# Patient Record
Sex: FEMALE | Race: WHITE | Employment: FULL TIME | URBAN - METROPOLITAN AREA
[De-identification: names, ages, dates, MRNs, and addresses within clinical notes are randomized per-mention and may not be internally consistent; named-entity substitution may affect disease eponyms.]

---

## 2018-04-23 ENCOUNTER — TRANSFERRED RECORDS (OUTPATIENT)
Dept: HEALTH INFORMATION MANAGEMENT | Facility: CLINIC | Age: 28
End: 2018-04-23

## 2018-05-08 ENCOUNTER — TRANSFERRED RECORDS (OUTPATIENT)
Dept: HEALTH INFORMATION MANAGEMENT | Facility: CLINIC | Age: 28
End: 2018-05-08

## 2018-06-01 ENCOUNTER — TRANSFERRED RECORDS (OUTPATIENT)
Dept: HEALTH INFORMATION MANAGEMENT | Facility: CLINIC | Age: 28
End: 2018-06-01

## 2018-06-22 ENCOUNTER — OFFICE VISIT (OUTPATIENT)
Dept: PULMONOLOGY | Facility: CLINIC | Age: 28
End: 2018-06-22
Attending: INTERNAL MEDICINE
Payer: COMMERCIAL

## 2018-06-22 VITALS
HEIGHT: 66 IN | SYSTOLIC BLOOD PRESSURE: 114 MMHG | DIASTOLIC BLOOD PRESSURE: 76 MMHG | OXYGEN SATURATION: 95 % | BODY MASS INDEX: 27.13 KG/M2 | WEIGHT: 168.8 LBS | RESPIRATION RATE: 16 BRPM | HEART RATE: 94 BPM

## 2018-06-22 DIAGNOSIS — K50.10 CROHN'S DISEASE OF LARGE INTESTINE WITHOUT COMPLICATION (H): Primary | ICD-10-CM

## 2018-06-22 DIAGNOSIS — B02.9 HERPES ZOSTER WITHOUT COMPLICATION: ICD-10-CM

## 2018-06-22 PROCEDURE — G0463 HOSPITAL OUTPT CLINIC VISIT: HCPCS | Mod: ZF

## 2018-06-22 ASSESSMENT — PAIN SCALES - GENERAL: PAINLEVEL: NO PAIN (0)

## 2018-06-22 NOTE — LETTER
6/22/2018       RE: Natalie Villa  [address] [place] [id number]     Dear Colleague,    Thank you for referring your patient, Natalie Villa, to the [facility] at [facility]. Please see a copy of my visit note below.    GI CLINIC VISIT - NEW PATIENT    CC/REFERRING PROVIDER: Referred Self  REASON FOR CONSULTATION: Establish IBD care    HPI: 27 year old female with history of Crohn's vs US on her third biologic + AZA who presents to clinic to establish care after moving from [place].      She was initially diagnosed with Crohn's in 2013. Her symptoms at the time were bloody diarrhea, left sided abdominal pain and general fatigue. She was treated with Humira for 1 year but stopped after colonoscopy showed ongoing disease. Then was treated with Remicade for about 18 months but per patient report, thinks she developed antibodies to it. She was started on Vedolizumab in June of 2015 initially at 300 mg Q8 weeks which was increased to Q6 weeks and mostly recently to Q4 weeks in May of this year after colonoscopy in April showed ongoing colitis. She has been on AZA since diagnosis, initially at 200 mg Qday which has been titrated down to 100 mg Qday.     She currently has very few symptoms. She has 3 BM/day that are well formed, without mucus or blood. She has no abdominal pain. She has no recent hospitalizations, ED visits or steroid requirements.     Last week noticed a painful vesicular rash on the left inner thigh that wraps around the back of her leg and was diagnosed with shingles. Started on a 7 day course of valcyclovir.     Denies any tenesmus or insecurity passing flatus, no fecal incontinence or new perianal/nocturnal sxs noted. Denies any N/V/F/C/HA/NS or other const/syst/cardiopulmonary sxs, no BRBPR/melena/, no unintentional wt loss or appetite/satiety changes. No other bowel/bladder habit changes, no dysphagia/odynophagia. No acholic  stools/steatorrhea, no jt pain/oral ulcer/rash/eye sxs noted.    ROS: 10pt ROS performed and otherwise negative.    PERTINENT PAST MEDICAL HISTORY:  As noted above.    PREVIOUS ABDOMINAL/GYNECOLOGIC SURGERIES:  None     PREVIOUS ENDOSCOPY:  Colonoscopy 4/23/2018: Mild colitis starting at the transverse colon (Borrego 1), progressing to more moderate colitis in the rectosigmoid (Borrego 2). Bx showed chronic colitis. TI and right colon were unremarkable endoscopically and bx showed mild chronic activity.     PERTINENT MEDICATIONS:  - Azathioprine 100 mg Qday   - Vedolizumab 300 mg Q4W (last dose 6/13/2018)  - Maria Victoria OCP   - Valacyclovir 7 day course     Medications reviewed with patient today, see Medication List/Assessment for details.  No other NSAID/anticoagulation reported by patient.  No other OTC/herbal/supplements reported by patient.    SOCIAL HISTORY:  - Works as   - Uses alcohol 1-2 times/month   - Smokes marijuana 2-3 times/week  - No tobacco   - Sexually active, uses condoms and OCPs as birth control     FAMILY HISTORY:  No colon/panc/esophageal/other GI CA, no other Valverde or other HPS-related Keo. No IBD/celiac, no other AI/liver/thyroid disease. + for asthma and acne.     IBD HEALTH MAINTENANCE:  Vaccinations:  -- Influenza (every year): Last given 2017  -- TdaP (every 10 years): Last given 2014  -- Pneumococcal Pneumonia (once then every 5 years): Last given 2015  -- Yearly assessment for latent Tb (verbal screening and exam, PPD or QuantiFERON-Tb testing): Indeterminate due to anergy 2017     One time confirmation of immunity or serologies:  -- Hepatitis A (serologies or immunizations): Unknown  -- Hepatitis B (serologies or immunizations): Vaccinated  -- Varicella: Unknown  -- MMR: Unknwon   -- HPV (all aged 18-26): Up to date  -- Meningococcal meningitis (all patients at risk for meningitis): Unknown  -- Due to the immunosuppression in this patient, I would not advise administration of live  vaccines such as varicella/VZV, intranasal influenza, MMR, or yellow fever vaccine (if travelling).      Bone mineral density screening   -- DEXA WNL 2015    Cancer Screening:  Colon cancer screening: due 2020  Cervical cancer screening: Annual due to immunosupression    PHYSICAL EXAMINATION:  Vitals reviewed, AFVSS  Wt 168# today   Gen: aaox3, cooperative, pleasant, not diaphoretic, nad  HEENT: ncat, neck supple, normal op w/o ulcer/exudate, anicteric, mmm  Resp/CV without acute findings, not dyspneic/tachycardic  Abd: Nondistended  Ext: no c/c/e  Skin: warm, perfused, no jaundice  Neuro: grossly intact    PERTINENT STUDIES:  Outside labs from 4/16/2018:  Lytes/LFT WNL, cr 0.65, alb 4.2  wbc 3.96, hgb 11.8, plt 246, mcv 95  ESR/CRP 6/2.6  Iron studies, B12, vit D WNL in 2016    ASSESSMENT/PLAN:    1. Colonic Crohn's Disease with concern for indeterminate IBD/phenotypic UC. She has had good clinical response to Entyvio and AZA however need to confirm mucosal response in addition to clinical response. Discussed that it is concerning that she is young and currently on her third biologic therapy (hx of Humira, Remicade) with a relative rapid dose escalation.   - Continue Entyvio 300mg IV Q4 weeks (dose adjusted to Q4W in May 2018)  - Continue AZA 100mg Qday. Will check TPMT and metabolite levels to determine is dose adjustment needs to be made given recent change in Entyvio timing and recent ongoing colitis on colonoscopy  - Likely will need repeat colonoscopy in 3-4 months to ensure mucosal response to change  In Entyvio dosing. Will confirm timing of the exam at return visit.  - Will check nutritional studies and medication safety monitoring today (BMP, LFT, CBC w/diff, lipase, ESR/CRP)  - Briefly discussed family planning with use of dual contraception (condoms + OCP)    2. IBD health maintenance   - Establish care with PCP to ensure all vaccinations have been updated   - Check VZV and MMR titers to confirm immunity    - Avoid liver attenuated vaccines     3. Shingles   - Complete course of Valacyclovir as prescribed     RTC in 3 months, sooner if symptomatic.      Patient was seen and discussed with attending physician Dr. Niraj Toth, who agrees with above assessment and plan.     Nikki Sanford, MS4     ATTENDING ATTESTATION:    REFERRING PROVIDER: Self-referred  DATE SEEN: 6/22/18    1. It was wonderful getting a chance to meet with you to discuss your colonic Crohn's Disease (discussed concerns for indeterminate IBD/phenotypic UC today), particularly given your good overall clinical response to Entyvio + Azathioprine (AZA) - discussed next steps in management and evaluation at length today, particularly given your prior exposures to biologic therapies (Humira and Remicade).  - Continue Entyvio 300mg IV every 4 weeks as ordered for now, recent transition to this schedule in the last 1-2 months. Will work on transitioning this treatment to our care, discussed potential insurance/logistical delays accordingly.  - Continue your AZA 100mg daily as ordered for now. Will check TPMT level to confirm metabolism and metabolite levels to help determine if further adjustment is necessary.  - Recommend moving forward with a repeat diagnostic colonoscopy to confirm mucosal response, likely in the next 3-4 months. Can readdress timing at next visit.  - Briefly discussed Preventive Care and Fertility/Pregnancy in IBD today. Continue your dual-contraceptive measures (condoms + OCP) for now.  - Complete your 10-day course of valacyclovir for treatment of shingles as ordered.    2. Recommend routine studies including nutritional markers as we discussed today.   - Recommend having the following studies checked at least 2-3 times/year for disease and medication safety monitoring: BMP, LFT, CBC with differential, ESR/CRP.    3. Continue to monitor for the danger signs/symptoms we reviewed together today:  worsening abdominal pain, worsening  diarrhea, obstructive symptoms (nausea/vomiting, abdominal distention, inability to pass stool/flatus), blood mixed into stools, persistent fevers/chills, progressive anemia (particulary with iron deficiency), difficulty with swallowing, perianal/rectal discomfort (particularly with defecation), unexpected weight loss, etc.  - Contact us via MyChart or phone should these symptoms occur, particularly as we may advise further evaluation accordingly.    4. Return to GI Clinic with my GI Physician Assistant (Guero Arias) or me in 3 months to review your progress, sooner if symptomatic.     Thank you for this consultation. It was a pleasure to participate in the care of this patient; please contact us with any further questions.    Patient was discussed, seen, and examined by me, Niraj Toth. The plan of care and pertinent data/imaging were also reviewed with the GI Medical Student in clinic today. Agree with the joint assessment and plan as delineated above.    Please contact me with any further questions.    Niraj Toth MD    [facility] - Department of Medicine  Division of Gastroenterology                Again, thank you for allowing me to participate in the care of your patient.      Sincerely,    Niraj Toth MD

## 2018-06-22 NOTE — PATIENT INSTRUCTIONS
I've included a brief summary of our discussion and care plan from today's visit below.  Please review this information with your primary care provider.  _______________________________________________________________________      1. It was wonderful getting a chance to meet with you to discuss your colonic Crohn's Disease (discussed concerns for indeterminate IBD/phenotypic UC today), particularly given your good overall clinical response to Entyvio + Azathioprine (AZA) - discussed next steps in management and evaluation at length today, particularly given your prior exposures to biologic therapies (Humira and Remicade).  - Continue Entyvio 300mg IV every 4 weeks as ordered for now, recent transition to this schedule in the last 1-2 months. Will work on transitioning this treatment to our care, discussed potential insurance/logistical delays accordingly.  - Continue your AZA 100mg daily as ordered for now. Will check TPMT level to confirm metabolism and metabolite levels to help determine if further adjustment is necessary.  - Recommend moving forward with a repeat diagnostic colonoscopy to confirm mucosal response, likely in the next 3-4 months. Can readdress timing at next visit.  - Briefly discussed Preventive Care and Fertility/Pregnancy in IBD today. Continue your dual-contraceptive measures (condoms + OCP) for now.  - Complete your 10-day course of valacyclovir for treatment of shingles as ordered.    2. Recommend routine studies including nutritional markers as we discussed today.   - Recommend having the following studies checked at least 2-3 times/year for disease and medication safety monitoring: BMP, LFT, CBC with differential, ESR/CRP.    3. Continue to monitor for the danger signs/symptoms we reviewed together today:  worsening abdominal pain, worsening diarrhea, obstructive symptoms (nausea/vomiting, abdominal distention, inability to pass stool/flatus), blood mixed into stools, persistent  fevers/chills, progressive anemia (particulary with iron deficiency), difficulty with swallowing, perianal/rectal discomfort (particularly with defecation), unexpected weight loss, etc.  - Contact us via MyChart or phone should these symptoms occur, particularly as we may advise further evaluation accordingly.    4. Return to GI Clinic with my GI Physician Assistant (Guero Arias) or me in 3 months to review your progress, sooner if symptomatic.   - If you are unable to schedule this follow-up appointment today, please contact our  at [phone] within the next week to help set up this necessary appointment.    PREVENTIVE CARE IN INFLAMMATORY BOWEL DISEASE    - Strongly recommend tobacco abstinence.    - Recommend considering daily calcium + Vitamin D supplementation.    - Recommend age-appropriate cancer surveillance:  - Yearly Dermatology visit for visual skin inspection if immunosuppressed, monthly skin self-check after bathing.  - Dysplasia surveillance colonoscopy every 1-2 years in patients with Crohn's colitis or ulcerative colitis >8-10 years since diagnosis.  - (For men and women ages 9-26) Consideration for HPV vaccination, should be discussed with your PCP further if you feel you'd like to pursue this.  - (For women) Annual Pap smears if immunosuppressed, mammogram when deemed appropriate by your PCP.    - Recommend follow-up with your PCP to ensure the following vaccinations have been updated: Hepatitis A/B, Tdap, Pneumococcal pneumonia, yearly flu SHOT, Meningococcal meningitis (if college-age), HPV (all aged 18-26), etc.  - Would also recommend VZV and MMR titers be checked to confirm immunity.  - Live/attenuated vaccines (such as the INTRANASAL flu vaccine, MMR, Yellow Fever, or Zostavax vaccine) should not be administered to immunosuppressed patients, except in very specific instances which you should discuss with a GI and Infectious Disease provider first.    - Family planning:  If you  or your partner are planning to become pregnant, please schedule time with us to discuss issues surrounding IBD and Fertility/Pregnancy.    _______________________________________________________________________    It was a pleasure seeing you in clinic today - please be in touch if there are any further questions that arise following today's visit.  During business hours, you may reach Clinic Nurse Triage Line at [phone].  For urgent/emergent questions after business hours, you may reach the on-call GI Fellow by contacting the [facility]  at [phone].    Any benign/non-urgent test results are usually communicated via letter or [employer] message within 1-2 weeks after completion.  Urgent results (those that require a change in the previously-discussed care plan) are usually communicated via a phone call once available from our clinic staff to discuss the results and the next steps in your evaluation.    I recommend signing up for [employer] access if you have not already done so and are comfortable with using a computer.  This allows for online access to your lab results and also helps you communicate efficiently with my clinic should any questions arise in your care.    We have Financial Counseling services available through our clinic.  If you have questions about your insurance coverage or payment responsibilities, particularly before you undergo any tests or procedures, please let us know and we can arrange a consultation accordingly to help you make informed decisions about your healthcare.    Sincerely,    Niraj Toth MD    [facility] - Department of Medicine  Division of Gastroenterology

## 2018-06-22 NOTE — LETTER
Date:June 26, 2018      Patient was self referred, no letter generated. Do not send.        [facility] Health Information

## 2018-06-22 NOTE — NURSING NOTE
Chief Complaint   Patient presents with     Consult     Patient is here for IBD consult     Cely Thomas, SMA

## 2018-06-22 NOTE — PROGRESS NOTES
GI CLINIC VISIT - NEW PATIENT    CC/REFERRING PROVIDER: Referred Self  REASON FOR CONSULTATION: Establish IBD care    HPI: 27 year old female with history of Crohn's vs US on her third biologic + AZA who presents to clinic to establish care after moving from [place].      She was initially diagnosed with Crohn's in 2013. Her symptoms at the time were bloody diarrhea, left sided abdominal pain and general fatigue. She was treated with Humira for 1 year but stopped after colonoscopy showed ongoing disease. Then was treated with Remicade for about 18 months but per patient report, thinks she developed antibodies to it. She was started on Vedolizumab in June of 2015 initially at 300 mg Q8 weeks which was increased to Q6 weeks and mostly recently to Q4 weeks in May of this year after colonoscopy in April showed ongoing colitis. She has been on AZA since diagnosis, initially at 200 mg Qday which has been titrated down to 100 mg Qday.     She currently has very few symptoms. She has 3 BM/day that are well formed, without mucus or blood. She has no abdominal pain. She has no recent hospitalizations, ED visits or steroid requirements.     Last week noticed a painful vesicular rash on the left inner thigh that wraps around the back of her leg and was diagnosed with shingles. Started on a 7 day course of valcyclovir.     Denies any tenesmus or insecurity passing flatus, no fecal incontinence or new perianal/nocturnal sxs noted. Denies any N/V/F/C/HA/NS or other const/syst/cardiopulmonary sxs, no BRBPR/melena/, no unintentional wt loss or appetite/satiety changes. No other bowel/bladder habit changes, no dysphagia/odynophagia. No acholic stools/steatorrhea, no jt pain/oral ulcer/rash/eye sxs noted.    ROS: 10pt ROS performed and otherwise negative.    PERTINENT PAST MEDICAL HISTORY:  As noted above.    PREVIOUS ABDOMINAL/GYNECOLOGIC SURGERIES:  None     PREVIOUS ENDOSCOPY:  Colonoscopy 4/23/2018: Mild colitis starting at the  transverse colon (Borrego 1), progressing to more moderate colitis in the rectosigmoid (Borrego 2). Bx showed chronic colitis. TI and right colon were unremarkable endoscopically and bx showed mild chronic activity.     PERTINENT MEDICATIONS:  - Azathioprine 100 mg Qday   - Vedolizumab 300 mg Q4W (last dose 6/13/2018)  - Maria Victoria OCP   - Valacyclovir 7 day course     Medications reviewed with patient today, see Medication List/Assessment for details.  No other NSAID/anticoagulation reported by patient.  No other OTC/herbal/supplements reported by patient.    SOCIAL HISTORY:  - Works as   - Uses alcohol 1-2 times/month   - Smokes marijuana 2-3 times/week  - No tobacco   - Sexually active, uses condoms and OCPs as birth control     FAMILY HISTORY:  No colon/panc/esophageal/other GI CA, no other Valverde or other HPS-related Keo. No IBD/celiac, no other AI/liver/thyroid disease. + for asthma and acne.     IBD HEALTH MAINTENANCE:  Vaccinations:  -- Influenza (every year): Last given 2017  -- TdaP (every 10 years): Last given 2014  -- Pneumococcal Pneumonia (once then every 5 years): Last given 2015  -- Yearly assessment for latent Tb (verbal screening and exam, PPD or QuantiFERON-Tb testing): Indeterminate due to anergy 2017     One time confirmation of immunity or serologies:  -- Hepatitis A (serologies or immunizations): Unknown  -- Hepatitis B (serologies or immunizations): Vaccinated  -- Varicella: Unknown  -- MMR: Unknwon   -- HPV (all aged 18-26): Up to date  -- Meningococcal meningitis (all patients at risk for meningitis): Unknown  -- Due to the immunosuppression in this patient, I would not advise administration of live vaccines such as varicella/VZV, intranasal influenza, MMR, or yellow fever vaccine (if travelling).      Bone mineral density screening   -- DEXA WNL 2015    Cancer Screening:  Colon cancer screening: due 2020  Cervical cancer screening: Annual due to immunosupression    PHYSICAL  EXAMINATION:  Vitals reviewed, AFVSS  Wt 168# today   Gen: aaox3, cooperative, pleasant, not diaphoretic, nad  HEENT: ncat, neck supple, normal op w/o ulcer/exudate, anicteric, mmm  Resp/CV without acute findings, not dyspneic/tachycardic  Abd: Nondistended  Ext: no c/c/e  Skin: warm, perfused, no jaundice  Neuro: grossly intact    PERTINENT STUDIES:  Outside labs from 4/16/2018:  Lytes/LFT WNL, cr 0.65, alb 4.2  wbc 3.96, hgb 11.8, plt 246, mcv 95  ESR/CRP 6/2.6  Iron studies, B12, vit D WNL in 2016    ASSESSMENT/PLAN:    1. Colonic Crohn's Disease with concern for indeterminate IBD/phenotypic UC. She has had good clinical response to Entyvio and AZA however need to confirm mucosal response in addition to clinical response. Discussed that it is concerning that she is young and currently on her third biologic therapy (hx of Humira, Remicade) with a relative rapid dose escalation.   - Continue Entyvio 300mg IV Q4 weeks (dose adjusted to Q4W in May 2018)  - Continue AZA 100mg Qday. Will check TPMT and metabolite levels to determine is dose adjustment needs to be made given recent change in Entyvio timing and recent ongoing colitis on colonoscopy  - Likely will need repeat colonoscopy in 3-4 months to ensure mucosal response to change  In Entyvio dosing. Will confirm timing of the exam at return visit.  - Will check nutritional studies and medication safety monitoring today (BMP, LFT, CBC w/diff, lipase, ESR/CRP)  - Briefly discussed family planning with use of dual contraception (condoms + OCP)    2. IBD health maintenance   - Establish care with PCP to ensure all vaccinations have been updated   - Check VZV and MMR titers to confirm immunity   - Avoid liver attenuated vaccines     3. Shingles   - Complete course of Valacyclovir as prescribed     RTC in 3 months, sooner if symptomatic.      Patient was seen and discussed with attending physician Dr. Niraj Toth, who agrees with above assessment and plan.     Nikki  MS Juvenal4     ATTENDING ATTESTATION:    REFERRING PROVIDER: Self-referred  DATE SEEN: 6/22/18    1. It was wonderful getting a chance to meet with you to discuss your colonic Crohn's Disease (discussed concerns for indeterminate IBD/phenotypic UC today), particularly given your good overall clinical response to Entyvio + Azathioprine (AZA) - discussed next steps in management and evaluation at length today, particularly given your prior exposures to biologic therapies (Humira and Remicade).  - Continue Entyvio 300mg IV every 4 weeks as ordered for now, recent transition to this schedule in the last 1-2 months. Will work on transitioning this treatment to our care, discussed potential insurance/logistical delays accordingly.  - Continue your AZA 100mg daily as ordered for now. Will check TPMT level to confirm metabolism and metabolite levels to help determine if further adjustment is necessary.  - Recommend moving forward with a repeat diagnostic colonoscopy to confirm mucosal response, likely in the next 3-4 months. Can readdress timing at next visit.  - Briefly discussed Preventive Care and Fertility/Pregnancy in IBD today. Continue your dual-contraceptive measures (condoms + OCP) for now.  - Complete your 10-day course of valacyclovir for treatment of shingles as ordered.    2. Recommend routine studies including nutritional markers as we discussed today.   - Recommend having the following studies checked at least 2-3 times/year for disease and medication safety monitoring: BMP, LFT, CBC with differential, ESR/CRP.    3. Continue to monitor for the danger signs/symptoms we reviewed together today:  worsening abdominal pain, worsening diarrhea, obstructive symptoms (nausea/vomiting, abdominal distention, inability to pass stool/flatus), blood mixed into stools, persistent fevers/chills, progressive anemia (particulary with iron deficiency), difficulty with swallowing, perianal/rectal discomfort (particularly  with defecation), unexpected weight loss, etc.  - Contact us via MyChart or phone should these symptoms occur, particularly as we may advise further evaluation accordingly.    4. Return to GI Clinic with my GI Physician Assistant (Guero Arias) or me in 3 months to review your progress, sooner if symptomatic.     Thank you for this consultation. It was a pleasure to participate in the care of this patient; please contact us with any further questions.    Patient was discussed, seen, and examined by me, Niraj Toth. The plan of care and pertinent data/imaging were also reviewed with the GI Medical Student in clinic today. Agree with the joint assessment and plan as delineated above.    Please contact me with any further questions.    Niraj Toth MD    [facility] - Department of Medicine  Division of Gastroenterology

## 2018-06-24 ENCOUNTER — HOSPITAL ENCOUNTER (EMERGENCY)
Facility: CLINIC | Age: 28
Discharge: HOME | End: 2018-06-24
Attending: NURSE PRACTITIONER | Admitting: NURSE PRACTITIONER
Payer: COMMERCIAL

## 2018-06-24 ENCOUNTER — APPOINTMENT (OUTPATIENT)
Dept: ULTRASOUND IMAGING | Facility: CLINIC | Age: 28
End: 2018-06-24
Attending: NURSE PRACTITIONER
Payer: COMMERCIAL

## 2018-06-24 VITALS
OXYGEN SATURATION: 98 % | HEIGHT: 65 IN | DIASTOLIC BLOOD PRESSURE: 89 MMHG | RESPIRATION RATE: 18 BRPM | WEIGHT: 160 LBS | TEMPERATURE: 98.6 F | BODY MASS INDEX: 26.66 KG/M2 | SYSTOLIC BLOOD PRESSURE: 125 MMHG

## 2018-06-24 DIAGNOSIS — M79.662 PAIN OF LEFT CALF: ICD-10-CM

## 2018-06-24 PROCEDURE — 99284 EMERGENCY DEPT VISIT MOD MDM: CPT | Mod: 25

## 2018-06-24 PROCEDURE — 93971 EXTREMITY STUDY: CPT | Mod: LT

## 2018-06-24 ASSESSMENT — ENCOUNTER SYMPTOMS
CHILLS: 0
NUMBNESS: 0
FEVER: 0
WEAKNESS: 0
SHORTNESS OF BREATH: 0
COLOR CHANGE: 1

## 2018-06-24 NOTE — ED PROVIDER NOTES
History     Chief Complaint:  Leg Pain    HPI   Summer Allen is a 27 year old female with a history of shingles who presents with lower left leg pain. She states she recently moved to [place] from [place] last week, and faced a shingles outbreak this past Wednesday, 4 days ago, on her left hamstring, which she received antibiotics for via the urgent care. On Thursday, 3 days ago, the patient notes she felt a pain in her lower left calf and thought it may have been a mosquito bite initially. However, the patient reports the pain and swelling have worsened since then, and have further radiated up her left leg, prompting her to visit the ED today. She denies any fevers, chest pain, shortness of breath, or numbness.    PE/DVT RISK FACTORS:  Sex:    Female  Hormones:   Yes, birth control pills  Tobacco:   Never smoker, passive smoke exposure  Cancer:   No  Travel:   Yes, moved here from [place] on 6/15/18  Surgery:   No  Other immobilization: No  Personal history:  No  Family history:  No    Allergies:  Sulfa Drugs    Medications:    Albuterol  Imuran  Maria Victoria  Celexa  Advair Diskus  Atarax  Iron  Elimite   Triamcinolone  Valacyclovir    Past Medical History:    The patient denies any relevant past medical history.    Past Surgical History:    History reviewed. No pertinent past surgical history.    Family History:    The patient denies any relevant family medical history.    Social History:  Marital Status:  Single  Smoking Status: Never, passive exposure to secondhand smoke (Mother)  Alcohol Use: Yes    Review of Systems   Constitutional: Negative for chills and fever.   Respiratory: Negative for shortness of breath.    Cardiovascular: Negative for chest pain.   Skin: Positive for color change and rash.   Neurological: Negative for weakness and numbness.   All other systems reviewed and are negative.    Physical Exam   Patient Vitals for the past 24 hrs:   BP Temp Temp src Heart Rate Resp SpO2 Height Weight  "  06/24/18 1546 125/89 98.6  F (37  C) Temporal 101 18 98 % 1.651 m (5' 5\") 72.6 kg (160 lb)     Physical Exam  General: Appears stated age  Cardio: Regular rate and rhythm, no murmurs, rubs, or gallops  Pulmonary/Chest: Clear to ausculation bilaterally.    Abdomen: Soft, non-tender, normal bowel sounds, no rebound or guarding  Musculoskeletal: No pedal edema, normal gait. PT/DP 2+, left calf + tenderness to palpation, no erythema or cellulitis, left thigh has shingles rash intact, no drainage   Neuro: Alert and oriented.   Skin: Normal color and temperature.   Psych: Mood and affect normal.      Emergency Department Course   Imaging:  Radiographic findings were communicated with the patient who voiced understanding of the findings.    US Lower Extremity Venous Duplex Left:  1. No evidence of deep venous thrombosis within the left lower  extremity.   2. Superficial venous thrombosis within the mid and distal lesser  saphenous vein in the leg.  As read by Radiology.    Emergency Department Course:  Past medical records, nursing notes, and vitals reviewed.  1550: I performed an exam of the patient and obtained history, as documented above.  The patient was sent for a lower extremity venous ultrasound while in the emergency department, findings above.    1700: I rechecked the patient. Explained findings to the patient.     I rechecked the patient. Findings and plan explained to the patient. Patient discharged home with instructions regarding supportive care, medications, and reasons to return. The importance of close follow-up was reviewed.     Impression & Plan    Medical Decision Making:  Natalie Villa is a 27 year old female presents for evaluation with left calf pain.  Signs and symptoms are consistent with a strain of the left calf muscle. A broad differential was considered including sprain, strain, DVT, fracture, tendon rupture, referred pain. The patient was sent for an ultrasound of her left lower leg, which " demonstrated no evidence of DVT. Supportive outpatient management is indicated as this is likely a strain. Rest, ice, and elevation treatment was discussed with the patient. The patient's head to toe exam is otherwise negative for serious underlying disease of the head, neck, chest, abdomen, extremities, pelvis. Close follow-up with patient's primary care physician per discharge precautions. Contusion discharge instructions given for home.     Diagnosis:    ICD-10-CM   1. Pain of left calf M79.662         Disposition: Discharged to home    Michael Chamberlain  6/24/2018   [facility] EMERGENCY DEPARTMENT    I, Michael Chamberlain, am serving as a scribe at 3:50 PM on 6/24/2018 to document services personally performed by Pee Elliott, PIPPA THOMPSON based on my observations and the provider's statements to me.        Pee Elliott APRN CNP  06/24/18 1820

## 2018-06-24 NOTE — ED TRIAGE NOTES
Pt arrives with c/o L calf pain since Tuesday. On PO BC and drove from [place] to [place] last week. Denies CP or SOB. ABC intact.

## 2018-06-24 NOTE — ED AVS SNAPSHOT
[facility] Emergency Department    [address]    [facility] [address]    Phone:  [phone]    Fax:  [phone]                                       Natalie Villa   MRN: [id number]    Department:  [facility] Emergency Department   Date of Visit:  6/24/2018           After Visit Summary Signature Page     I have received my discharge instructions, and my questions have been answered. I have discussed any challenges I see with this plan with the nurse or doctor.    ..........................................................................................................................................  Patient/Patient Representative Signature      ..........................................................................................................................................  Patient Representative Print Name and Relationship to Patient    ..................................................               ................................................  Date                                            Time    ..........................................................................................................................................  Reviewed by Signature/Title    ...................................................              ..............................................  Date                                                            Time

## 2018-06-24 NOTE — ED AVS SNAPSHOT
[facility] Emergency Department    [address] [place] [address]    Phone:  [phone]    Fax:  [phone]                                       Natalie Villa   MRN: [id number]    Department:  [facility] Emergency Department   Date of Visit:  6/24/2018           Patient Information     Date Of Birth          1990        Your diagnoses for this visit were:     Pain of left calf        You were seen by Pee Elliott, PIPPA THOMPSON.      Follow-up Information     Follow up with No Ref-Primary, Physician In 3 days.      Discharge References/Attachments     R.I.C.E. (ENGLISH)      24 Hour Appointment Hotline       To make an appointment at any [facility] clinic, call [phone] ([phone]). If you don't have a family doctor or clinic, we will help you find one. [facility] clinics are conveniently located to serve the needs of you and your family.             Review of your medicines      Our records show that you are taking the medicines listed below. If these are incorrect, please call your family doctor or clinic.        Dose / Directions Last dose taken    ADVAIR DISKUS 500-50 MCG/DOSE diskus inhaler   Dose:  1 puff   Generic drug:  fluticasone-salmeterol        Inhale 1 puff into the lungs daily.   Refills:  0        ALBUTEROL IN        None Entered   Refills:  0        citalopram 20 MG tablet   Commonly known as:  celeXA   Dose:  20 mg        Take 20 mg by mouth daily.   Refills:  0        ENT (HEMADERM) Applicator Kit 1 KIT Kit        Apply topically once   Refills:  0        hydrOXYzine 25 MG tablet   Commonly known as:  ATARAX   Dose:  25-50 mg   Quantity:  60 tablet        Take 1-2 tablets by mouth every 6 hours as needed for itching.   Refills:  1        IMURAN PO   Dose:  50 mg   Indication:  Inflammatory Bowel Disease        Take 50 mg by mouth 2 times daily   Refills:  0        IRON PO   Dose:  1 tablet        Take 1 tablet by mouth daily.   Refills:   0        permethrin 5 % cream   Commonly known as:  ELIMITE   Quantity:  60 g        Massage into skin from head to foot.  Leave on for 8-14hrs and then wash off.  May repeat in 1 wk if needed.   Refills:  0        triamcinolone 0.1 % cream   Commonly known as:  KENALOG   Quantity:  60 g        APPLY TO EFFECTED AREA BID   Refills:  0        VALACYCLOVIR HCL PO        Refills:  0        CHINO 3-0.03 MG per tablet   Dose:  1 tablet   Generic drug:  drospirenone-ethinyl estradiol        Take 1 tablet by mouth daily.   Refills:  0                Procedures and tests performed during your visit     US Lower Extremity Venous Duplex Left      Orders Needing Specimen Collection     None      Pending Results     No orders found from 6/22/2018 to 6/25/2018.            Pending Culture Results     No orders found from 6/22/2018 to 6/25/2018.            Pending Results Instructions     If you had any lab results that were not finalized at the time of your Discharge, you can call the ED Lab Result RN at [phone]. You will be contacted by this team for any positive Lab results or changes in treatment. The nurses are available 7 days a week from 10A to 6:30P.  You can leave a message 24 hours per day and they will return your call.        Test Results From Your Hospital Stay        6/24/2018  5:11 PM      Narrative     VENOUS ULTRASOUND LEFT LEG  6/24/2018 4:37 PM     HISTORY: Question DVT.    COMPARISON: None.    TECHNIQUE: Examination of the deep veins was completed with graded  compression and color flow Doppler with spectral wave form analysis.    FINDINGS: There is no evidence of thrombus in the common femoral,  femoral, or popliteal veins. There is no evidence of thrombus within  the calf veins.     The lesser saphenous vein is compressible proximally but becomes  noncompressible in the mid and distal leg containing hyperechoic  thrombus.         Impression     IMPRESSION:   1. No evidence of deep venous thrombosis  within the left lower  extremity.   2. Superficial venous thrombosis within the mid and distal lesser  saphenous vein in the leg.         SHARDA YOUNGBLOOD MD                Clinical Quality Measure: Blood Pressure Screening     Your blood pressure was checked while you were in the emergency department today. The last reading we obtained was  BP: 125/89 . Please read the guidelines below about what these numbers mean and what you should do about them.  If your systolic blood pressure (the top number) is less than 120 and your diastolic blood pressure (the bottom number) is less than 80, then your blood pressure is normal. There is nothing more that you need to do about it.  If your systolic blood pressure (the top number) is 120-139 or your diastolic blood pressure (the bottom number) is 80-89, your blood pressure may be higher than it should be. You should have your blood pressure rechecked within a year by a primary care provider.  If your systolic blood pressure (the top number) is 140 or greater or your diastolic blood pressure (the bottom number) is 90 or greater, you may have high blood pressure. High blood pressure is treatable, but if left untreated over time it can put you at risk for heart attack, stroke, or kidney failure. You should have your blood pressure rechecked by a primary care provider within the next 4 weeks.  If your provider in the emergency department today gave you specific instructions to follow-up with your doctor or provider even sooner than that, you should follow that instruction and not wait for up to 4 weeks for your follow-up visit.        Thank you for choosing [facility]       Thank you for choosing [facility] for your care. Our goal is always to provide you with excellent care. Hearing back from our patients is one way we can continue to improve our services. Please take a few minutes to complete the written survey that you may receive in the mail after you visit with us. Thank you!         [employer] Information     [employer] gives you secure access to your electronic health record. If you see a primary care provider, you can also send messages to your care team and make appointments. If you have questions, please call your primary care clinic.  If you do not have a primary care provider, please call [phone] and they will assist you.        Care EveryWhere ID     This is your Care EveryWhere ID. This could be used by other organizations to access your [facility] medical records  [id number]        Equal Access to Services     MIKE CADET : Hadii mirela pratto Soazael, waaxda luqadaha, qaybta kaalmada donny, anu viera. So [facility] [phone].    ATENCIÓN: Si habla español, tiene a pham disposición servicios gratuitos de asistencia lingüística. Llame al [phone].    We comply with applicable federal civil rights laws and [place] laws. We do not discriminate on the basis of race, color, national origin, age, disability, sex, sexual orientation, or gender identity.            After Visit Summary       This is your record. Keep this with you and show to your community pharmacist(s) and doctor(s) at your next visit.

## 2018-06-25 ENCOUNTER — HEALTH MAINTENANCE LETTER (OUTPATIENT)
Age: 28
End: 2018-06-25

## 2018-07-09 DIAGNOSIS — K50.10 CROHN'S DISEASE OF LARGE INTESTINE WITHOUT COMPLICATION (H): ICD-10-CM

## 2018-07-09 DIAGNOSIS — B02.9 HERPES ZOSTER WITHOUT COMPLICATION: ICD-10-CM

## 2018-07-09 LAB
ALBUMIN SERPL-MCNC: 3.1 G/DL (ref 3.4–5)
ALP SERPL-CCNC: 79 U/L (ref 40–150)
ALT SERPL W P-5'-P-CCNC: 18 U/L (ref 0–50)
ANION GAP SERPL CALCULATED.3IONS-SCNC: 12 MMOL/L (ref 3–14)
AST SERPL W P-5'-P-CCNC: 14 U/L (ref 0–45)
BASOPHILS # BLD AUTO: 0 10E9/L (ref 0–0.2)
BASOPHILS NFR BLD AUTO: 0.5 %
BILIRUB DIRECT SERPL-MCNC: 0.2 MG/DL (ref 0–0.2)
BILIRUB SERPL-MCNC: 0.7 MG/DL (ref 0.2–1.3)
BUN SERPL-MCNC: 17 MG/DL (ref 7–30)
CALCIUM SERPL-MCNC: 9 MG/DL (ref 8.5–10.1)
CHLORIDE SERPL-SCNC: 104 MMOL/L (ref 94–109)
CO2 SERPL-SCNC: 20 MMOL/L (ref 20–32)
CREAT SERPL-MCNC: 0.68 MG/DL (ref 0.52–1.04)
CRP SERPL-MCNC: 45 MG/L (ref 0–8)
DEPRECATED CALCIDIOL+CALCIFEROL SERPL-MC: 33 UG/L (ref 20–75)
DIFFERENTIAL METHOD BLD: NORMAL
EOSINOPHIL # BLD AUTO: 0.5 10E9/L (ref 0–0.7)
EOSINOPHIL NFR BLD AUTO: 5.9 %
ERYTHROCYTE [DISTWIDTH] IN BLOOD BY AUTOMATED COUNT: 12.9 % (ref 10–15)
ERYTHROCYTE [SEDIMENTATION RATE] IN BLOOD BY WESTERGREN METHOD: 18 MM/H (ref 0–20)
FOLATE SERPL-MCNC: 21.6 NG/ML
GFR SERPL CREATININE-BSD FRML MDRD: >90 ML/MIN/1.7M2
GLUCOSE SERPL-MCNC: 93 MG/DL (ref 70–99)
HCT VFR BLD AUTO: 36.2 % (ref 35–47)
HGB BLD-MCNC: 12.3 G/DL (ref 11.7–15.7)
LIPASE SERPL-CCNC: 144 U/L (ref 73–393)
LYMPHOCYTES # BLD AUTO: 2.4 10E9/L (ref 0.8–5.3)
LYMPHOCYTES NFR BLD AUTO: 27.4 %
MCH RBC QN AUTO: 30.7 PG (ref 26.5–33)
MCHC RBC AUTO-ENTMCNC: 34 G/DL (ref 31.5–36.5)
MCV RBC AUTO: 90 FL (ref 78–100)
MONOCYTES # BLD AUTO: 0.6 10E9/L (ref 0–1.3)
MONOCYTES NFR BLD AUTO: 7.2 %
NEUTROPHILS # BLD AUTO: 5.2 10E9/L (ref 1.6–8.3)
NEUTROPHILS NFR BLD AUTO: 59 %
PLATELET # BLD AUTO: 203 10E9/L (ref 150–450)
POTASSIUM SERPL-SCNC: 4 MMOL/L (ref 3.4–5.3)
PROT SERPL-MCNC: 8.2 G/DL (ref 6.8–8.8)
RBC # BLD AUTO: 4.01 10E12/L (ref 3.8–5.2)
SODIUM SERPL-SCNC: 136 MMOL/L (ref 133–144)
TSH SERPL DL<=0.005 MIU/L-ACNC: 1.93 MU/L (ref 0.4–4)
VIT B12 SERPL-MCNC: 347 PG/ML (ref 193–986)
WBC # BLD AUTO: 8.8 10E9/L (ref 4–11)

## 2018-07-09 PROCEDURE — 99000 SPECIMEN HANDLING OFFICE-LAB: CPT | Performed by: INTERNAL MEDICINE

## 2018-07-09 PROCEDURE — 83516 IMMUNOASSAY NONANTIBODY: CPT | Mod: 59 | Performed by: INTERNAL MEDICINE

## 2018-07-09 PROCEDURE — 36415 COLL VENOUS BLD VENIPUNCTURE: CPT | Performed by: INTERNAL MEDICINE

## 2018-07-09 PROCEDURE — 85025 COMPLETE CBC W/AUTO DIFF WBC: CPT | Performed by: INTERNAL MEDICINE

## 2018-07-09 PROCEDURE — 85652 RBC SED RATE AUTOMATED: CPT | Performed by: INTERNAL MEDICINE

## 2018-07-09 PROCEDURE — 84443 ASSAY THYROID STIM HORMONE: CPT | Performed by: INTERNAL MEDICINE

## 2018-07-09 PROCEDURE — 80299 QUANTITATIVE ASSAY DRUG: CPT | Mod: 90 | Performed by: INTERNAL MEDICINE

## 2018-07-09 PROCEDURE — 82306 VITAMIN D 25 HYDROXY: CPT | Performed by: INTERNAL MEDICINE

## 2018-07-09 PROCEDURE — 80076 HEPATIC FUNCTION PANEL: CPT | Performed by: INTERNAL MEDICINE

## 2018-07-09 PROCEDURE — 80048 BASIC METABOLIC PNL TOTAL CA: CPT | Performed by: INTERNAL MEDICINE

## 2018-07-09 PROCEDURE — 82746 ASSAY OF FOLIC ACID SERUM: CPT | Performed by: INTERNAL MEDICINE

## 2018-07-09 PROCEDURE — 82657 ENZYME CELL ACTIVITY: CPT | Mod: 90 | Performed by: INTERNAL MEDICINE

## 2018-07-09 PROCEDURE — 83516 IMMUNOASSAY NONANTIBODY: CPT | Performed by: INTERNAL MEDICINE

## 2018-07-09 PROCEDURE — 82607 VITAMIN B-12: CPT | Performed by: INTERNAL MEDICINE

## 2018-07-09 PROCEDURE — 83690 ASSAY OF LIPASE: CPT | Performed by: INTERNAL MEDICINE

## 2018-07-09 PROCEDURE — 86140 C-REACTIVE PROTEIN: CPT | Performed by: INTERNAL MEDICINE

## 2018-07-10 LAB
TTG IGA SER-ACNC: 1 U/ML
TTG IGG SER-ACNC: <1 U/ML

## 2018-07-12 LAB — TPMT BLD-CCNC: 26.8 U/ML (ref 24–44)

## 2018-07-14 LAB
6-TGN ENTSUB RBC: 138 (ref 235–400)
6MMP ENTSUB RBC: <500

## 2018-07-23 ENCOUNTER — HOSPITAL ENCOUNTER (OUTPATIENT)
Facility: CLINIC | Age: 28
Setting detail: SPECIMEN
Discharge: HOME | End: 2018-07-23
Attending: INTERNAL MEDICINE | Admitting: INTERNAL MEDICINE
Payer: COMMERCIAL

## 2018-07-23 ENCOUNTER — INFUSION THERAPY VISIT (OUTPATIENT)
Dept: INFUSION THERAPY | Facility: CLINIC | Age: 28
End: 2018-07-23
Attending: INTERNAL MEDICINE
Payer: COMMERCIAL

## 2018-07-23 VITALS — TEMPERATURE: 98.3 F | SYSTOLIC BLOOD PRESSURE: 106 MMHG | DIASTOLIC BLOOD PRESSURE: 64 MMHG | HEART RATE: 80 BPM

## 2018-07-23 DIAGNOSIS — K50.10 CROHN'S DISEASE OF LARGE INTESTINE WITHOUT COMPLICATION (H): Primary | ICD-10-CM

## 2018-07-23 LAB
ALBUMIN SERPL-MCNC: 3.3 G/DL (ref 3.4–5)
ALP SERPL-CCNC: 66 U/L (ref 40–150)
ALT SERPL W P-5'-P-CCNC: 13 U/L (ref 0–50)
AST SERPL W P-5'-P-CCNC: 14 U/L (ref 0–45)
BASOPHILS # BLD AUTO: 0 10E9/L (ref 0–0.2)
BASOPHILS NFR BLD AUTO: 0.2 %
BILIRUB DIRECT SERPL-MCNC: 0.1 MG/DL (ref 0–0.2)
BILIRUB SERPL-MCNC: 0.5 MG/DL (ref 0.2–1.3)
CRP SERPL-MCNC: 3.4 MG/L (ref 0–8)
DIFFERENTIAL METHOD BLD: ABNORMAL
EOSINOPHIL # BLD AUTO: 0.2 10E9/L (ref 0–0.7)
EOSINOPHIL NFR BLD AUTO: 4.9 %
ERYTHROCYTE [DISTWIDTH] IN BLOOD BY AUTOMATED COUNT: 13.1 % (ref 10–15)
ERYTHROCYTE [SEDIMENTATION RATE] IN BLOOD BY WESTERGREN METHOD: 12 MM/H (ref 0–20)
HCT VFR BLD AUTO: 34.5 % (ref 35–47)
HGB BLD-MCNC: 11.4 G/DL (ref 11.7–15.7)
IMM GRANULOCYTES # BLD: 0 10E9/L (ref 0–0.4)
IMM GRANULOCYTES NFR BLD: 0.4 %
LYMPHOCYTES # BLD AUTO: 1.3 10E9/L (ref 0.8–5.3)
LYMPHOCYTES NFR BLD AUTO: 27.3 %
MCH RBC QN AUTO: 29.9 PG (ref 26.5–33)
MCHC RBC AUTO-ENTMCNC: 33 G/DL (ref 31.5–36.5)
MCV RBC AUTO: 91 FL (ref 78–100)
MONOCYTES # BLD AUTO: 0.3 10E9/L (ref 0–1.3)
MONOCYTES NFR BLD AUTO: 6.7 %
NEUTROPHILS # BLD AUTO: 2.8 10E9/L (ref 1.6–8.3)
NEUTROPHILS NFR BLD AUTO: 60.5 %
NRBC # BLD AUTO: 0 10*3/UL
NRBC BLD AUTO-RTO: 0 /100
PLATELET # BLD AUTO: 233 10E9/L (ref 150–450)
PROT SERPL-MCNC: 7.8 G/DL (ref 6.8–8.8)
RBC # BLD AUTO: 3.81 10E12/L (ref 3.8–5.2)
WBC # BLD AUTO: 4.7 10E9/L (ref 4–11)

## 2018-07-23 PROCEDURE — 86140 C-REACTIVE PROTEIN: CPT | Performed by: INTERNAL MEDICINE

## 2018-07-23 PROCEDURE — 96413 CHEMO IV INFUSION 1 HR: CPT

## 2018-07-23 PROCEDURE — 80076 HEPATIC FUNCTION PANEL: CPT | Performed by: INTERNAL MEDICINE

## 2018-07-23 PROCEDURE — 85025 COMPLETE CBC W/AUTO DIFF WBC: CPT | Performed by: INTERNAL MEDICINE

## 2018-07-23 PROCEDURE — 25000128 H RX IP 250 OP 636: Performed by: INTERNAL MEDICINE

## 2018-07-23 PROCEDURE — 85652 RBC SED RATE AUTOMATED: CPT | Performed by: INTERNAL MEDICINE

## 2018-07-23 RX ADMIN — SODIUM CHLORIDE 250 ML: 9 INJECTION, SOLUTION INTRAVENOUS at 09:53

## 2018-07-23 RX ADMIN — VEDOLIZUMAB 300 MG: 300 INJECTION, POWDER, LYOPHILIZED, FOR SOLUTION INTRAVENOUS at 09:53

## 2018-07-23 NOTE — MR AVS SNAPSHOT
After Visit Summary   7/23/2018    Natalie Villa    MRN: [id number]           Patient Information     Date Of Birth          1990        Visit Information        Provider Department      7/23/2018 9:00 AM  INFUSION CHAIR 13 [facility]        Today's Diagnoses     Crohn's disease of large intestine without complication (H)    -  1       Follow-ups after your visit        Your next 10 appointments already scheduled     Aug 24, 2018  8:00 AM CDT   Level 2 with RH INFUSION CHAIR 10   [facility] ([facility])    [facility]  [address]  [facility] [address]   [phone]            Sep 21, 2018  8:00 AM CDT   Level 2 with RH INFUSION CHAIR 10   [facility] ([facility])    [facility]  [address]  [facility] [address]   [phone]            Oct 19, 2018  8:00 AM CDT   Level 2 with  INFUSION CHAIR 1   [facility] ([facility])    [facility]  [address]  [facility] [address]   [phone]              Future tests that were ordered for you today     Open Standing Orders        Priority Remaining Interval Expires Ordered    Notify Physician Routine [id number] PRN  7/23/2018            Who to contact     If you have questions or need follow up information about today's clinic visit or your schedule please contact [facility] directly at [phone].  Normal or non-critical lab and imaging results will be communicated to you by MyChart, letter or phone within 4 business days after the clinic has received the results. If you do not hear from us within 7 days, please contact the clinic through MyChart or phone. If you have a critical or abnormal lab result, we will notify you  by phone as soon as possible.  Submit refill requests through [employer] or call your pharmacy and they will forward the refill request to us. Please allow 3 business days for your refill to be completed.          Additional Information About Your Visit        [employer] Information     [employer] gives you secure access to your electronic health record. If you see a primary care provider, you can also send messages to your care team and make appointments. If you have questions, please call your primary care clinic.  If you do not have a primary care provider, please call [phone] and they will assist you.        Care EveryWhere ID     This is your Care EveryWhere ID. This could be used by other organizations to access your [facility] medical records  [id number]        Your Vitals Were     Pulse Temperature                80 98.3  F (36.8  C)           Blood Pressure from Last 3 Encounters:   07/23/18 106/64   06/24/18 125/89   06/22/18 114/76    Weight from Last 3 Encounters:   06/24/18 72.6 kg (160 lb)   06/22/18 76.6 kg (168 lb 12.8 oz)   08/08/12 86.2 kg (190 lb)              We Performed the Following     CBC with platelets differential     CRP inflammation     Erythrocyte sedimentation rate auto     Hepatic panel        Primary Care Provider Fax #    Physician No Ref-Primary [phone]       No address on file        Equal Access to Services     MIKE CADET : Hadii mirela prtato Soafshanali, waaxda luqadaha, qaybta kaalmada adeegyada, anu farris . So [facility] [phone].    ATENCIÓN: Si habla español, tiene a pham disposición servicios gratuitos de asistencia lingüística. Llame al [phone].    We comply with applicable federal civil rights laws and [place] laws. We do not discriminate on the basis of race, color, national origin, age, disability, sex, sexual orientation, or gender identity.            Thank you!     Thank you for choosing [facility] AND [facility]   for your care. Our goal is always to provide you with excellent care. Hearing back from our patients is one way we can continue to improve our services. Please take a few minutes to complete the written survey that you may receive in the mail after your visit with us. Thank you!             Your Updated Medication List - Protect others around you: Learn how to safely use, store and throw away your medicines at www.disposemymeds.org.          This list is accurate as of 7/23/18 11:00 AM.  Always use your most recent med list.                   Brand Name Dispense Instructions for use Diagnosis    ADVAIR DISKUS 500-50 MCG/DOSE diskus inhaler   Generic drug:  fluticasone-salmeterol      Inhale 1 puff into the lungs daily.        ALBUTEROL IN      None Entered        citalopram 20 MG tablet    celeXA     Take 20 mg by mouth daily.        ENT (HEMADERM) Applicator Kit 1 KIT Kit      Apply topically once    Crohn's disease of large intestine without complication (H), Herpes zoster without complication       hydrOXYzine 25 MG tablet    ATARAX    60 tablet    Take 1-2 tablets by mouth every 6 hours as needed for itching.        IMURAN PO      Take 50 mg by mouth 2 times daily        IRON PO      Take 1 tablet by mouth daily.        permethrin 5 % cream    ELIMITE    60 g    Massage into skin from head to foot.  Leave on for 8-14hrs and then wash off.  May repeat in 1 wk if needed.        triamcinolone 0.1 % cream    KENALOG    60 g    APPLY TO EFFECTED AREA BID        VALACYCLOVIR HCL PO       Crohn's disease of large intestine without complication (H), Herpes zoster without complication       CHINO 3-0.03 MG per tablet   Generic drug:  drospirenone-ethinyl estradiol      Take 1 tablet by mouth daily.

## 2018-07-23 NOTE — PROGRESS NOTES
"Infusion Nursing Note:  Natalie Villa presents today for labs and entyvio.    Patient seen by provider today: No   present during visit today: Not Applicable.    Note: N/A.    Intravenous Access:  Peripheral IV placed.    Treatment Conditions:  Rheumatology Infusion Checklist: PRIOR TO INFUSION OF BIOLOGICAL MEDICATIONS OR ANY OF THESE AS LISTED: .rheumbiologicalchecklist\"    Prior to Infusion of biological medications or any of these as listed:    1. Elevated temperature, fever, chills, productive cough or abnormal vital signs, night sweats, coughing up blood or sputum, no appetite or abnormal vital signs :NO  2. Open wounds or new incisions: NO  3. Recent hospitalization: N)  4.  Recent surgeries:NO  5. Any upcoming surgeries or dental procedures?NO  6. Any current or recent bouts of illness or infection? On any antibiotics? :NO    7. Any new, sudden or worsening abdominal pain NO  8. Vaccination within 4 weeks? Patient or someone in the household is scheduled to receive vaccination? No live virus vaccines prior to or during treatment NO  9. Any nervous system diseases [i.e. multiple sclerosis, Guillain-[place], seizures, neurological  changes]:NO  10. Pregnant or breast feeding; or plans on pregnancy in the future: NO    11. Signs of worsening depression or suicidal ideations while taking benlystaNO  12. New-onset medical symptoms: NO  13.  New cancer diagnosis or on chemotherapy or radiationNO    14.  Evaluate for any sign of active TB [Unexplained weight loss, Loss of appetite, Night sweats, Fever, Fatigue, Chills, Coughing for 3 weeks or longer, Hemoptysis (coughing up blood), Chest pain]:NO    .      Post Infusion Assessment:  Patient tolerated infusion without incident.  Access discontinued per protocol.    Discharge Plan:   Patient discharged in stable condition accompanied by: self.  Departure Mode: Ambulatory.    Mellisa Lucas RN                      "

## 2018-08-24 ENCOUNTER — INFUSION THERAPY VISIT (OUTPATIENT)
Dept: INFUSION THERAPY | Facility: CLINIC | Age: 28
End: 2018-08-24
Attending: INTERNAL MEDICINE
Payer: COMMERCIAL

## 2018-08-24 ENCOUNTER — HOSPITAL ENCOUNTER (OUTPATIENT)
Facility: CLINIC | Age: 28
Setting detail: SPECIMEN
Discharge: HOME | End: 2018-08-24
Attending: INTERNAL MEDICINE | Admitting: INTERNAL MEDICINE
Payer: COMMERCIAL

## 2018-08-24 VITALS
DIASTOLIC BLOOD PRESSURE: 70 MMHG | RESPIRATION RATE: 16 BRPM | SYSTOLIC BLOOD PRESSURE: 113 MMHG | HEART RATE: 88 BPM | BODY MASS INDEX: 28.61 KG/M2 | WEIGHT: 171.9 LBS | OXYGEN SATURATION: 98 % | TEMPERATURE: 97 F

## 2018-08-24 DIAGNOSIS — K50.10 CROHN'S DISEASE OF LARGE INTESTINE WITHOUT COMPLICATION (H): Primary | ICD-10-CM

## 2018-08-24 LAB
ALBUMIN SERPL-MCNC: 3.1 G/DL (ref 3.4–5)
ALP SERPL-CCNC: 106 U/L (ref 40–150)
ALT SERPL W P-5'-P-CCNC: 10 U/L (ref 0–50)
AST SERPL W P-5'-P-CCNC: 12 U/L (ref 0–45)
BASOPHILS # BLD AUTO: 0.1 10E9/L (ref 0–0.2)
BASOPHILS NFR BLD AUTO: 1 %
BILIRUB DIRECT SERPL-MCNC: 0.1 MG/DL (ref 0–0.2)
BILIRUB SERPL-MCNC: 0.5 MG/DL (ref 0.2–1.3)
CRP SERPL-MCNC: 55.6 MG/L (ref 0–8)
DIFFERENTIAL METHOD BLD: ABNORMAL
EOSINOPHIL # BLD AUTO: 0.8 10E9/L (ref 0–0.7)
EOSINOPHIL NFR BLD AUTO: 11.1 %
ERYTHROCYTE [DISTWIDTH] IN BLOOD BY AUTOMATED COUNT: 12.1 % (ref 10–15)
ERYTHROCYTE [SEDIMENTATION RATE] IN BLOOD BY WESTERGREN METHOD: 28 MM/H (ref 0–20)
HCT VFR BLD AUTO: 35.2 % (ref 35–47)
HGB BLD-MCNC: 11.5 G/DL (ref 11.7–15.7)
IMM GRANULOCYTES # BLD: 0.1 10E9/L (ref 0–0.4)
IMM GRANULOCYTES NFR BLD: 0.9 %
LYMPHOCYTES # BLD AUTO: 1.6 10E9/L (ref 0.8–5.3)
LYMPHOCYTES NFR BLD AUTO: 22.5 %
MCH RBC QN AUTO: 29.9 PG (ref 26.5–33)
MCHC RBC AUTO-ENTMCNC: 32.7 G/DL (ref 31.5–36.5)
MCV RBC AUTO: 91 FL (ref 78–100)
MONOCYTES # BLD AUTO: 0.6 10E9/L (ref 0–1.3)
MONOCYTES NFR BLD AUTO: 8.8 %
NEUTROPHILS # BLD AUTO: 3.9 10E9/L (ref 1.6–8.3)
NEUTROPHILS NFR BLD AUTO: 55.7 %
NRBC # BLD AUTO: 0 10*3/UL
NRBC BLD AUTO-RTO: 0 /100
PLATELET # BLD AUTO: 265 10E9/L (ref 150–450)
PROT SERPL-MCNC: 8.3 G/DL (ref 6.8–8.8)
RBC # BLD AUTO: 3.85 10E12/L (ref 3.8–5.2)
WBC # BLD AUTO: 7 10E9/L (ref 4–11)

## 2018-08-24 PROCEDURE — 86140 C-REACTIVE PROTEIN: CPT | Performed by: INTERNAL MEDICINE

## 2018-08-24 PROCEDURE — 85025 COMPLETE CBC W/AUTO DIFF WBC: CPT | Performed by: INTERNAL MEDICINE

## 2018-08-24 PROCEDURE — 85652 RBC SED RATE AUTOMATED: CPT | Performed by: INTERNAL MEDICINE

## 2018-08-24 PROCEDURE — 80076 HEPATIC FUNCTION PANEL: CPT | Performed by: INTERNAL MEDICINE

## 2018-08-24 PROCEDURE — 25000128 H RX IP 250 OP 636: Performed by: INTERNAL MEDICINE

## 2018-08-24 PROCEDURE — 96365 THER/PROPH/DIAG IV INF INIT: CPT

## 2018-08-24 RX ORDER — MULTIVITAMIN,THERAPEUTIC
1 TABLET ORAL DAILY
COMMUNITY
End: 2019-12-19

## 2018-08-24 RX ADMIN — VEDOLIZUMAB 300 MG: 300 INJECTION, POWDER, LYOPHILIZED, FOR SOLUTION INTRAVENOUS at 08:52

## 2018-08-24 ASSESSMENT — PAIN SCALES - GENERAL: PAINLEVEL: NO PAIN (0)

## 2018-08-24 NOTE — PROGRESS NOTES
"Infusion Nursing Note:  Natalie Villa presents today for Entyvio.    Patient seen by provider today: No   present during visit today: Not Applicable.    Note: Has c/o increased diarrhea over the past month.  Indicates she has been under a lot of stress with a recent move and stress at work.  Having around 5 diarrhea stools/day.  Has had blood in stools x2.  Denies abdominal pain.  Does not think she is having a flare but will call GI if symptoms get worse.  Denies previous SE to Entyvio.    Intravenous Access:  Lab draw site RAC, Needle type angiocath, Gauge 24.  Labs drawn without difficulty.  Peripheral IV placed.    Treatment Conditions:  Rheumatology Infusion Checklist: PRIOR TO INFUSION OF BIOLOGICAL MEDICATIONS OR ANY OF THESE AS LISTED: Entyvio \".rheumbiologicalchecklist\"    Prior to Infusion of biological medications or any of these as listed:    1. Elevated temperature, fever, chills, productive cough or abnormal vital signs, night sweats, coughing up blood or sputum, no appetite or abnormal vital signs : NO    2. Open wounds or new incisions: NO    3. Recent hospitalization: NO    4.  Recent surgeries:  NO    5. Any upcoming surgeries or dental procedures?:NO    6. Any current or recent bouts of illness or infection? On any antibiotics? : NO    7. Any new, sudden or worsening abdominal pain :NO    8. Vaccination within 4 weeks? Patient or someone in the household is scheduled to receive vaccination? No live virus vaccines prior to or during treatment :NO    9. Any nervous system diseases [i.e. multiple sclerosis, Guillain-[place], seizures, neurological  changes]: NO    10. Pregnant or breast feeding; or plans on pregnancy in the future: NO    11. Signs of worsening depression or suicidal ideations while taking benlysta:NO    12. New-onset medical symptoms: NO    13.  New cancer diagnosis or on chemotherapy or radiation NO    14.  Evaluate for any sign of active TB [Unexplained weight loss, Loss " of appetite, Night sweats, Fever, Fatigue, Chills, Coughing for 3 weeks or longer, Hemoptysis (coughing up blood), Chest pain]: NO    **Note: If answered yes to any of the above, hold the infusion and contact ordering rheumatologist or on-call rheumatologist.   .      Post Infusion Assessment:  Patient tolerated infusion without incident.  Blood return noted pre and post infusion.  Site patent and intact, free from redness, edema or discomfort.  No evidence of extravasations.  Access discontinued per protocol.    Discharge Plan:   Discharge instructions reviewed with: Patient.  Patient discharged in stable condition accompanied by: self.  Departure Mode: Ambulatory.  Next infusion scheduled for 9/21/18.    REJI JIMENEZ RN

## 2018-08-24 NOTE — MR AVS SNAPSHOT
After Visit Summary   8/24/2018    Natalie Villa    MRN: [id number]           Patient Information     Date Of Birth          1990        Visit Information        Provider Department      8/24/2018 8:00 AM RH INFUSION CHAIR 10 [facility]        Today's Diagnoses     Crohn's disease of large intestine without complication (H)    -  1       Follow-ups after your visit        Your next 10 appointments already scheduled     Sep 21, 2018  8:00 AM CDT   Level 2 with RH INFUSION CHAIR 10   [facility] ([facility])    [facility]  [address]  [facility] [address]   [phone]            Oct 19, 2018  8:00 AM CDT   Level 2 with RH INFUSION CHAIR 1   [facility] ([facility])    [facility]  [address]  [facility] [id number] [phone]              Who to contact     If you have questions or need follow up information about today's clinic visit or your schedule please contact [facility] INFUSION SERVICES directly at [phone].  Normal or non-critical lab and imaging results will be communicated to you by Lov[employer] Spoonfulshart, letter or phone within 4 business days after the clinic has received the results. If you do not hear from us within 7 days, please contact the clinic through [employer]oonfulshart or phone. If you have a critical or abnormal lab result, we will notify you by phone as soon as possible.  Submit refill requests through [employer] or call your pharmacy and they will forward the refill request to us. Please allow 3 business days for your refill to be completed.          Additional Information About Your Visit        MyChart Information     [employer] gives you secure access to your electronic health record. If you see a primary care provider, you can also send messages to your care team and  make appointments. If you have questions, please call your primary care clinic.  If you do not have a primary care provider, please call [phone] and they will assist you.        Care EveryWhere ID     This is your Care EveryWhere ID. This could be used by other organizations to access your [place] medical records  [id number]        Your Vitals Were     Pulse Temperature Respirations Pulse Oximetry BMI (Body Mass Index)       88 97  F (36.1  C) 16 98% 28.61 kg/m2        Blood Pressure from Last 3 Encounters:   08/24/18 113/70   07/23/18 106/64   06/24/18 125/89    Weight from Last 3 Encounters:   08/24/18 78 kg (171 lb 14.4 oz)   06/24/18 72.6 kg (160 lb)   06/22/18 76.6 kg (168 lb 12.8 oz)              We Performed the Following     CBC with platelets differential     CRP inflammation     Erythrocyte sedimentation rate auto     Hepatic panel        Primary Care Provider Fax #    Physician No Ref-Primary [phone]       No address on file        Equal Access to Services     MIKE CADET : Hadii mirela ku hadasho Soazael, waaxda luqadaha, qaybta kaalmada adeegyada, anu farris . So [facility] [phone].    ATENCIÓN: Si habla español, tiene a pham disposición servicios gratuitos de asistencia lingüística. Ll[facility] [phone].    We comply with applicable federal civil rights laws and [place] laws. We do not discriminate on the basis of race, color, national origin, age, disability, sex, sexual orientation, or gender identity.            Thank you!     Thank you for choosing [facility]  for your care. Our goal is always to provide you with excellent care. Hearing back from our patients is one way we can continue to improve our services. Please take a few minutes to complete the written survey that you may receive in the mail after your visit with us. Thank you!             Your Updated Medication List - Protect others around you: Learn how to  safely use, store and throw away your medicines at www.disposemymeds.org.          This list is accurate as of 8/24/18  9:51 AM.  Always use your most recent med list.                   Brand Name Dispense Instructions for use Diagnosis    ALBUTEROL IN      None Entered        IMURAN PO      Take 50 mg by mouth 2 times daily        multivitamin, therapeutic Tabs tablet      Take 1 tablet by mouth daily        CHINO 3-0.03 MG per tablet   Generic drug:  drospirenone-ethinyl estradiol      Take 1 tablet by mouth daily.

## 2018-09-07 ENCOUNTER — CARE COORDINATION (OUTPATIENT)
Dept: GASTROENTEROLOGY | Facility: CLINIC | Age: 28
End: 2018-09-07

## 2018-09-07 ENCOUNTER — TELEPHONE (OUTPATIENT)
Dept: GASTROENTEROLOGY | Facility: CLINIC | Age: 28
End: 2018-09-07

## 2018-09-07 NOTE — TELEPHONE ENCOUNTER
LVM for patient in regards to scheduling f/u appointment with either Dr. Toth or Guero Arias. Left call back number for patient to call and schedule appointment.

## 2018-09-19 ASSESSMENT — ENCOUNTER SYMPTOMS
MUSCLE CRAMPS: 0
NAUSEA: 0
HALLUCINATIONS: 0
CHILLS: 0
BLOOD IN STOOL: 1
ABDOMINAL PAIN: 0
VOMITING: 0
JAUNDICE: 0
FEVER: 1
WEIGHT LOSS: 0
DECREASED APPETITE: 1
BLOATING: 0
INCREASED ENERGY: 0
POLYDIPSIA: 0
MYALGIAS: 1
CONSTIPATION: 0
BOWEL INCONTINENCE: 0
DIARRHEA: 1
POLYPHAGIA: 0
MUSCLE WEAKNESS: 0
HEARTBURN: 0
FATIGUE: 1
RECTAL PAIN: 0
STIFFNESS: 0
JOINT SWELLING: 0
ALTERED TEMPERATURE REGULATION: 0
ARTHRALGIAS: 0
NECK PAIN: 0
NIGHT SWEATS: 0
BACK PAIN: 1
WEIGHT GAIN: 0

## 2018-09-21 ENCOUNTER — HOSPITAL ENCOUNTER (OUTPATIENT)
Facility: CLINIC | Age: 28
Setting detail: SPECIMEN
Discharge: HOME | End: 2018-09-21
Attending: INTERNAL MEDICINE | Admitting: INTERNAL MEDICINE
Payer: COMMERCIAL

## 2018-09-21 ENCOUNTER — INFUSION THERAPY VISIT (OUTPATIENT)
Dept: INFUSION THERAPY | Facility: CLINIC | Age: 28
End: 2018-09-21
Attending: INTERNAL MEDICINE
Payer: COMMERCIAL

## 2018-09-21 VITALS
SYSTOLIC BLOOD PRESSURE: 118 MMHG | RESPIRATION RATE: 16 BRPM | HEART RATE: 77 BPM | TEMPERATURE: 97.6 F | DIASTOLIC BLOOD PRESSURE: 79 MMHG | OXYGEN SATURATION: 100 %

## 2018-09-21 DIAGNOSIS — K50.10 CROHN'S DISEASE OF LARGE INTESTINE WITHOUT COMPLICATION (H): Primary | ICD-10-CM

## 2018-09-21 LAB
ALBUMIN SERPL-MCNC: 3.4 G/DL (ref 3.4–5)
ALP SERPL-CCNC: 70 U/L (ref 40–150)
ALT SERPL W P-5'-P-CCNC: 7 U/L (ref 0–50)
AST SERPL W P-5'-P-CCNC: 11 U/L (ref 0–45)
BASOPHILS # BLD AUTO: 0 10E9/L (ref 0–0.2)
BASOPHILS NFR BLD AUTO: 0.7 %
BILIRUB DIRECT SERPL-MCNC: 0.2 MG/DL (ref 0–0.2)
BILIRUB SERPL-MCNC: 0.7 MG/DL (ref 0.2–1.3)
CRP SERPL-MCNC: 5.2 MG/L (ref 0–8)
DIFFERENTIAL METHOD BLD: ABNORMAL
EOSINOPHIL # BLD AUTO: 0.5 10E9/L (ref 0–0.7)
EOSINOPHIL NFR BLD AUTO: 8.7 %
ERYTHROCYTE [DISTWIDTH] IN BLOOD BY AUTOMATED COUNT: 12.1 % (ref 10–15)
ERYTHROCYTE [SEDIMENTATION RATE] IN BLOOD BY WESTERGREN METHOD: 21 MM/H (ref 0–20)
HCT VFR BLD AUTO: 35.6 % (ref 35–47)
HGB BLD-MCNC: 11.4 G/DL (ref 11.7–15.7)
IMM GRANULOCYTES # BLD: 0 10E9/L (ref 0–0.4)
IMM GRANULOCYTES NFR BLD: 0.5 %
LYMPHOCYTES # BLD AUTO: 1.7 10E9/L (ref 0.8–5.3)
LYMPHOCYTES NFR BLD AUTO: 29.2 %
MCH RBC QN AUTO: 29.6 PG (ref 26.5–33)
MCHC RBC AUTO-ENTMCNC: 32 G/DL (ref 31.5–36.5)
MCV RBC AUTO: 93 FL (ref 78–100)
MONOCYTES # BLD AUTO: 0.4 10E9/L (ref 0–1.3)
MONOCYTES NFR BLD AUTO: 6.7 %
NEUTROPHILS # BLD AUTO: 3.2 10E9/L (ref 1.6–8.3)
NEUTROPHILS NFR BLD AUTO: 54.2 %
NRBC # BLD AUTO: 0 10*3/UL
NRBC BLD AUTO-RTO: 0 /100
PLATELET # BLD AUTO: 296 10E9/L (ref 150–450)
PROT SERPL-MCNC: 8.4 G/DL (ref 6.8–8.8)
RBC # BLD AUTO: 3.85 10E12/L (ref 3.8–5.2)
WBC # BLD AUTO: 6 10E9/L (ref 4–11)

## 2018-09-21 PROCEDURE — 86140 C-REACTIVE PROTEIN: CPT | Performed by: INTERNAL MEDICINE

## 2018-09-21 PROCEDURE — 85652 RBC SED RATE AUTOMATED: CPT | Performed by: INTERNAL MEDICINE

## 2018-09-21 PROCEDURE — 96365 THER/PROPH/DIAG IV INF INIT: CPT

## 2018-09-21 PROCEDURE — 80076 HEPATIC FUNCTION PANEL: CPT | Performed by: INTERNAL MEDICINE

## 2018-09-21 PROCEDURE — 85025 COMPLETE CBC W/AUTO DIFF WBC: CPT | Performed by: INTERNAL MEDICINE

## 2018-09-21 PROCEDURE — 25000128 H RX IP 250 OP 636: Performed by: INTERNAL MEDICINE

## 2018-09-21 RX ADMIN — SODIUM CHLORIDE 250 ML: 9 INJECTION, SOLUTION INTRAVENOUS at 08:28

## 2018-09-21 RX ADMIN — VEDOLIZUMAB 300 MG: 300 INJECTION, POWDER, LYOPHILIZED, FOR SOLUTION INTRAVENOUS at 08:28

## 2018-09-21 NOTE — PROGRESS NOTES
"      Infusion Nursing Note:  Natalie Villa presents today for labs and Entyvio.    Patient seen by provider today: No   present during visit today: Not Applicable.    Note: N/A.    Intravenous Access:  Labs drawn without difficulty.  Peripheral IV placed.    Treatment Conditions:  Rheumatology Infusion Checklist: PRIOR TO INFUSION OF BIOLOGICAL MEDICATIONS OR ANY OF THESE AS LISTED: Entyvio\".rheumbiologicalchecklist\"    Prior to Infusion of biological medications or any of these as listed:    1. Elevated temperature, fever, chills, productive cough or abnormal vital signs, night sweats, coughing up blood or sputum, no appetite or abnormal vital signs : NO    2. Open wounds or new incisions: NO    3. Recent hospitalization: NO    4.  Recent surgeries:  NO    5. Any upcoming surgeries or dental procedures?:NO    6. Any current or recent bouts of illness or infection? On any antibiotics? : NO    7. Any new, sudden or worsening abdominal pain :NO    8. Vaccination within 4 weeks? Patient or someone in the household is scheduled to receive vaccination? No live virus vaccines prior to or during treatment :NO    9. Any nervous system diseases [i.e. multiple sclerosis, Guillain-[place], seizures, neurological  changes]: NO    10. Pregnant or breast feeding; or plans on pregnancy in the future: NO    11. Signs of worsening depression or suicidal ideations while taking benlysta:NO    12. New-onset medical symptoms: NO    13.  New cancer diagnosis or on chemotherapy or radiation NO    14.  Evaluate for any sign of active TB [Unexplained weight loss, Loss of appetite, Night sweats, Fever, Fatigue, Chills, Coughing for 3 weeks or longer, Hemoptysis (coughing up blood), Chest pain]: NO    **Note: If answered yes to any of the above, hold the infusion and contact ordering rheumatologist or on-call rheumatologist.   .      Post Infusion Assessment:  Patient tolerated infusion without incident.  Blood return noted pre and " post infusion.  Site patent and intact, free from redness, edema or discomfort.  No evidence of extravasations.  Access discontinued per protocol.  Rheumatology Post Infusion: POST-INFUSION OF BIOLOGICAL MEDICATION:    Reviewed with patient.  Given biologic medication or medication hand-out. Inform patient if any fever, chills or signs of infection, new symptoms, abdominal pain, heart palpitations, shortness of breath, reaction, weakness, neurological changes, seek medical attention immediately and should not receive infusions. No live virus vaccines prior to or during treatment or up to 6 months post infusion. If the patient has an upcoming procedure or surgery, this should be discussed with the rheumatologist and surgeon or provider..    Discharge Plan:   Patient declined prescription refills.  Discharge instructions reviewed with: Patient.  Patient and/or family verbalized understanding of discharge instructions and all questions answered.  Copy of AVS reviewed with patient and/or family.  Patient will return 10/19/18 for next appointment.  Patient discharged in stable condition accompanied by: self.  Departure Mode: Ambulatory.    Felisa Samano RN

## 2018-09-21 NOTE — MR AVS SNAPSHOT
After Visit Summary   9/21/2018    Natalie Villa    MRN: [id number]           Patient Information     Date Of Birth          1990        Visit Information        Provider Department      9/21/2018 8:00 AM  INFUSION CHAIR 3  [facility]        Today's Diagnoses     Crohn's disease of large intestine without complication (H)    -  1       Follow-ups after your visit        Your next 10 appointments already scheduled     Sep 25, 2018  2:40 PM CDT   (Arrive by 2:25 PM)   Return Visit with Guero Arias PA-C   [facility] ([facility])    [address] [place] [address]  [address]th [facility] [address]   [phone]            Oct 19, 2018  8:00 AM CDT   Level 2 with  INFUSION CHAIR 1    Infusion Services ([facility])    [facility]  [address]  [facility] [id number] [phone]              Future tests that were ordered for you today     Open Standing Orders        Priority Remaining Interval Expires Ordered    Notify Physician Routine [id number] PRN  9/21/2018            Who to contact     If you have questions or need follow up information about today's clinic visit or your schedule please contact [facility] INFUSION SERVICES directly at [phone].  Normal or non-critical lab and imaging results will be communicated to you by MyChart, letter or phone within 4 business days after the clinic has received the results. If you do not hear from us within 7 days, please contact the clinic through MyChart or phone. If you have a critical or abnormal lab result, we will notify you by phone as soon as possible.  Submit refill requests through [employer] or call your pharmacy and they will forward the refill request to us. Please allow 3 business days for your refill to be completed.          Additional Information About  Your Visit        Alive Juiceshart Information     [employer] gives you secure access to your electronic health record. If you see a primary care provider, you can also send messages to your care team and make appointments. If you have questions, please call your primary care clinic.  If you do not have a primary care provider, please call [phone] and they will assist you.        Care EveryWhere ID     This is your Care EveryWhere ID. This could be used by other organizations to access your [place] medical records  [id number]        Your Vitals Were     Pulse Temperature Respirations Pulse Oximetry          77 97.6  F (36.4  C) 16 100%         Blood Pressure from Last 3 Encounters:   09/21/18 118/79   08/24/18 113/70   07/23/18 106/64    Weight from Last 3 Encounters:   08/24/18 78 kg (171 lb 14.4 oz)   06/24/18 72.6 kg (160 lb)   06/22/18 76.6 kg (168 lb 12.8 oz)              We Performed the Following     CBC with platelets differential     CRP inflammation     Erythrocyte sedimentation rate auto     Hepatic panel        Primary Care Provider Fax #    Physician No Ref-Primary [phone]       No address on file        Equal Access to Services     MIKE CADET : Hadii mirela Rosas, waalexandre varma, sg hiens, anu farris . So [facility] [phone].    ATENCIÓN: Si habla español, tiene a pham disposición servicios gratPresbyterian Española Hospitalos de asistencia lingüística. Ll[facility] Surgical Hospital [phone].    We comply with applicable federal civil rights laws and [place] laws. We do not discriminate on the basis of race, color, national origin, age, disability, sex, sexual orientation, or gender identity.            Thank you!     Thank you for choosing [facility]  for your care. Our goal is always to provide you with excellent care. Hearing back from our patients is one way we can continue to improve our services. Please take a few minutes to complete the written  survey that you may receive in the mail after your visit with us. Thank you!             Your Updated Medication List - Protect others around you: Learn how to safely use, store and throw away your medicines at www.disposemymeds.org.          This list is accurate as of 9/21/18  9:28 AM.  Always use your most recent med list.                   Brand Name Dispense Instructions for use Diagnosis    ALBUTEROL IN      None Entered        IMURAN PO      Take 50 mg by mouth 2 times daily        multivitamin, therapeutic Tabs tablet      Take 1 tablet by mouth daily        CHINO 3-0.03 MG per tablet   Generic drug:  drospirenone-ethinyl estradiol      Take 1 tablet by mouth daily.

## 2018-09-24 ENCOUNTER — TELEPHONE (OUTPATIENT)
Dept: GASTROENTEROLOGY | Facility: CLINIC | Age: 28
End: 2018-09-24

## 2018-09-24 NOTE — TELEPHONE ENCOUNTER
Called and left message for patient reminding of appointment scheduled on 9/25/18 at 2:40pm with Guero Arias GI clinic. Patient to arrive 15 min early. If need to be rescheduled, patient to call [phone].    RYAN Monge

## 2018-09-25 ENCOUNTER — OFFICE VISIT (OUTPATIENT)
Dept: GASTROENTEROLOGY | Facility: CLINIC | Age: 28
End: 2018-09-25
Payer: COMMERCIAL

## 2018-09-25 ENCOUNTER — TELEPHONE (OUTPATIENT)
Dept: GASTROENTEROLOGY | Facility: CLINIC | Age: 28
End: 2018-09-25

## 2018-09-25 VITALS
BODY MASS INDEX: 28.94 KG/M2 | DIASTOLIC BLOOD PRESSURE: 72 MMHG | TEMPERATURE: 97.5 F | HEART RATE: 68 BPM | WEIGHT: 173.9 LBS | SYSTOLIC BLOOD PRESSURE: 120 MMHG

## 2018-09-25 DIAGNOSIS — K50.10 CROHN'S DISEASE OF LARGE INTESTINE WITHOUT COMPLICATION (H): Primary | ICD-10-CM

## 2018-09-25 ASSESSMENT — PAIN SCALES - GENERAL: PAINLEVEL: NO PAIN (0)

## 2018-09-25 NOTE — PROGRESS NOTES
GI CLINIC VISIT - NEW PATIENT    CC/REFERRING PROVIDER: Referred Self  REASON FOR CONSULTATION: IBD follow up  COLLABORATING PHYSICIAN: Niraj Toth MD    HPI: 28 year old female with history of Crohn's vs US on Vedolizumab (failed adalimumab, and questionable antibodies to infliximab, now on vedolizumab since 6/19/2015) + AZA, initially diagnosed with Crohn's in 2013. Her symptoms at the time were bloody diarrhea, left sided abdominal pain and general fatigue. She was treated with Humira for 1 year but stopped after colonoscopy showed ongoing disease. Then was treated with Remicade for about 18 months but per patient report, thinks she developed antibodies to it. She was started on Vedolizumab in June of 2015 initially at 300 mg Q8 weeks which was increased to Q6 weeks and mostly recently to Q4 weeks in May 2018 after colonoscopy in April 2018 showed ongoing colitis. She has been on AZA since diagnosis, initially at 200 mg Qday which has been titrated down to 100 mg Qday.     She currently has has 4 BM/day that are either firm pellets or watery stools, without mucus or blood. She has no abdominal pain. Last time she experienced blood in the stool was August and she believes this was due to hemorrhoidal bleeding. She currently has one hemorrhoid (not thrombosed). She recovered from shingles with Valacyclovir this past summer.    She was diagnosed with a LLE DVT thought to be provoked and is currently on Eliquis.      Denies any tenesmus or insecurity passing flatus, no fecal incontinence or new perianal/nocturnal sxs noted. Denies any N/V/F/C/HA/NS or other const/syst/cardiopulmonary sxs, no BRBPR/melena/, no unintentional wt loss or appetite/satiety changes. No other bowel/bladder habit changes, no dysphagia/odynophagia. No acholic stools/steatorrhea, no jt pain/oral ulcer/rash/eye sxs noted.    ROS: 10pt ROS performed and otherwise negative.    PERTINENT PAST MEDICAL HISTORY:  As noted above.    PREVIOUS  ABDOMINAL/GYNECOLOGIC SURGERIES:  None     PREVIOUS ENDOSCOPY:  Colonoscopy 4/23/2018 at [facility]: Mild colitis starting at the transverse colon (Borrego 1), progressing to more moderate colitis in the rectosigmoid (Borrego 2). Bx showed chronic colitis. TI and right colon were unremarkable endoscopically and bx showed mild chronic activity.     PERTINENT MEDICATIONS:  - Azathioprine 100 mg Qday   - Vedolizumab 300 mg Q4W (last dose 9/21/18)  - Maria Victroia OCP     Medications reviewed with patient today, see Medication List/Assessment for details.  No other NSAID/anticoagulation reported by patient.  No other OTC/herbal/supplements reported by patient.    SOCIAL HISTORY:  - Works as   - Uses alcohol 1-2 times/month   - Smokes marijuana 2-3 times/week  - No tobacco   - Sexually active, uses condoms and OCPs as birth control     FAMILY HISTORY:  No colon/panc/esophageal/other GI CA, no other Valverde or other HPS-related Keo. No IBD/celiac, no other AI/liver/thyroid disease. + for asthma and acne.     ASSESSMENT/PLAN:    1. Colonic Crohn's Disease with concern for indeterminate IBD/phenotypic UC, on her third biologic therapy (hx of Humira, Remicade) with a relative rapid dose escalation, recent DVT concern for ongoing inflammation (among other factors), who presents for follow up:  - Repeat colonoscopy to assess for concerns for ongoing active inflammation given new onset of LLE DVT  - If active inflammation, optimize IBD therapy.  Likely increase AZA, given 6TG level was 138 (subtherapeutic) and may need to push to even 400 (some thought that increased levels, especially in someone who has used multiple biologics in the past may benefit to higher levels around 400, beyond just therapeutic range).  -- If optimizing AZA is not effective, consider alternative biologic agents such as Stelara, given vedolizumab is already maximized.  Would likely not return to antiTNF given she did not have adequate response to  adalimumab.  - For now, continue Entyvio 300mg IV Q4 weeks (dose adjusted to Q4W in May 2018)  - Continue AZA 100mg Qday. May need to optimize based on above  - Labs with infusions to include LFT, CBC ESR/CRP    2. LLE DVT:   thought to be provoked (factors include prolonged car ride from [place] to [place] when moved here this summer, strain to gastrocnemius muscle on crutches for 6 weeks, on birth control and active IBD) and is currently on Eliquis.  Concern is of course for ongoing active IBD, leading to a hypercoagulable state.  From an IBD perspective, we will plan to repeat a colonoscopy to evaluate response to Vedolizumab and optimization of dosing to every 4 weeks.  If ongoing inflammation, will need to optimize IBD regimen as stated above.  Plan for anticoagulation by PCP is continuing anticoagulation for 3 months with Eliquis and once we have more medical information about her family history, will determine if she needs to be on anticoagulation longer or if she needs further workup of possible genetic hypercoaguable state with a hematology referral. PCP is also repeating US in 3 months. She discontinued oral contraceptives.     3. Continue to monitor for the danger signs/symptoms we reviewed together today:  worsening abdominal pain, worsening diarrhea, obstructive symptoms (nausea/vomiting, abdominal distention, inability to pass stool/flatus), blood mixed into stools, persistent fevers/chills, progressive anemia (particulary with iron deficiency), difficulty with swallowing, perianal/rectal discomfort (particularly with defecation), unexpected weight loss, etc.  - Contact us via MyChart or phone should these symptoms occur, particularly as we may advise further evaluation accordingly.    4. IBD health maintenance   Vaccinations:  -- Influenza (every year): Last given 2017  -- TdaP (every 10 years): Last given 2014  -- Pneumococcal Pneumonia (once then every 5 years): Last given 2015  -- Yearly assessment for  latent Tb (verbal screening and exam, PPD or QuantiFERON-Tb testing): Indeterminate due to anergy 2017     One time confirmation of immunity or serologies:  -- Hepatitis A (serologies or immunizations): Unknown  -- Hepatitis B (serologies or immunizations): Vaccinated  -- Varicella: had chicken pox as a child  -- MMR: [place]won   -- HPV (all aged 18-26): Up to date  -- Meningococcal meningitis (all patients at risk for meningitis): Unknown  -- Due to the immunosuppression in this patient, I would not advise administration of live vaccines such as varicella/VZV, intranasal influenza, MMR, or yellow fever vaccine (if travelling).      Bone mineral density screening   -- DEXA WNL 2015    Cancer Screening:  Colon cancer screening: due 2020  Cervical cancer screening: Annual due to immunosupression    PHYSICAL EXAMINATION:  Vitals reviewed, AFVSS  Wt 168# today   Gen: aaox3, cooperative, pleasant, not diaphoretic, nad  HEENT: ncat, neck supple, normal op w/o ulcer/exudate, anicteric, mmm  Resp/CV without acute findings, not dyspneic/tachycardic  Abd: Nondistended  Ext: no c/c/e  Skin: warm, perfused, no jaundice  Neuro: grossly intact    PERTINENT STUDIES:  9/21/18 labs show:  LFT WNL, alb 3.4  wbc 6, hgb 11.4, plt 296, mcv 93  ESR/CRP 6/2.6  Iron studies, B12, vit D WNL in 2016  3/14/18 fecal calprotectin was 2449    RTC in 3 months, sooner if symptomatic.      Guero Arias PA-C  Division of Gastroenterology, Hepatology and Nutrition  [facility]         Answers for HPI/ROS submitted by the patient on 9/19/2018   General Symptoms: Yes  Skin Symptoms: No  HENT Symptoms: No  EYE SYMPTOMS: No  HEART SYMPTOMS: No  LUNG SYMPTOMS: No  INTESTINAL SYMPTOMS: Yes  URINARY SYMPTOMS: No  GYNECOLOGIC SYMPTOMS: No  BREAST SYMPTOMS: No  SKELETAL SYMPTOMS: Yes  BLOOD SYMPTOMS: No  NERVOUS SYSTEM SYMPTOMS: No  MENTAL HEALTH SYMPTOMS: No  Fever: Yes  Loss of appetite: Yes  Weight loss: No  Weight gain: No  Fatigue:  Yes  Night sweats: No  Chills: No  Increased stress: Yes  Excessive hunger: No  Excessive thirst: No  Feeling hot or cold when others believe the temperature is normal: No  Loss of height: No  Post-operative complications: No  Surgical site pain: No  Hallucinations: No  Change in or Loss of Energy: No  Hyperactivity: No  Confusion: No  Heart burn or indigestion: No  Nausea: No  Vomiting: No  Abdominal pain: No  Bloating: No  Constipation: No  Diarrhea: Yes  Blood in stool: Yes  Black stools: No  Rectal or Anal pain: No  Fecal incontinence: No  Yellowing of skin or eyes: No  Change in stools: No  Back pain: Yes  Muscle aches: Yes  Neck pain: No  Swollen joints: No  Joint pain: No  Bone pain: No  Muscle cramps: No  Muscle weakness: No  Joint stiffness: No  Bone fracture: No

## 2018-09-25 NOTE — MR AVS SNAPSHOT
After Visit Summary   9/25/2018    Natalie Villa    MRN: [id number]           Patient Information     Date Of Birth          1990        Visit Information        Provider Department      9/25/2018 2:40 PM Guero Arias PA-C [facility]        Today's Diagnoses     Crohn's disease of large intestine without complication (H)    -  1      Care Instructions    It was a pleasure taking care of you today.  I've included a brief summary of our discussion and care plan from today's visit below.  Please review this information with your primary care provider.  ______________________________________________________________________    My recommendations are summarized as follows:    -- Continue Entyvio every 4 weeks for now  -- Continue azathioprine 100 mg for now  -- Labs with your infusions  -- Stool when able   -- Next endoscopic assessment: colonoscopy in the near future  -- Patient with IBD we recommend supplementation vitamin D 1000 units daily and calcium 500 mg twice daily.  -- Vaccines/immunizations to be updated: all up dated  -- Yearly Dermatology visit for skin check while on immunosuppressive therapy. Can call [phone] to schedule.  -- Yearly pap smear while on immunosuppressive therapy  -- No NSAIDs (ibuprofen, or anything containing ibuprofen)     For additional resources about inflammatory bowel disease visit [email or web address]      Return to GI Clinic in 3 months to review your progress.    ______________________________________________________________________    Who do I call with any questions after my visit?  Please be in touch if there are any further questions that arise following today's visit.  There are multiple ways to contact your gastroenterology care team.        During business hours, you may reach a Gastroenterology nurse at [phone], option 3.       To schedule or reschedule an appointment, please call [phone].        You can always send a secure message through [employer].  [employer] messages are answered by your nurse or doctor typically within 24 hours.  Please allow extra time on weekends and holidays.        For urgent/emergent questions after business hours, you may reach the on-call GI Fellow by contacting the [facility]  at [phone].      In order for your refill to be processed in a timely fashion, it is your responsibility to ensure you follow the recommendations from your provider regarding your laboratory studies and follow up appointments.       How will I get the results of any tests ordered?    You will receive all of your results.  If you have signed up for I[employer], any tests ordered at your visit will be available to you after your physician reviews them.  Typically this takes 1-2 weeks.  If there are urgent results that require a change in your care plan, your physician or nurse will call you to discuss the next steps.      What is [employer]?  [employer] is a secure way for you to access all of your healthcare records from the [facility].  It is a web based computer program, so you can sign on to it from any location.  It also allows you to send secure messages to your care team.  I recommend signing up for [employer] access if you have not already done so and are comfortable with using a computer.      How to I schedule a follow-up visit?  If you did not schedule a follow-up visit today, please call [phone] to schedule a follow-up office visit.        Sincerely,    Guero Arias PA-C  [facility]  Division of Gastroenterology                Follow-ups after your visit        Additional Services     GASTROENTEROLOGY ADULT REF PROCEDURE ONLY       Last Lab Result: Creatinine (mg/dL)       Date                     Value                 07/09/2018               0.68             ----------  Body mass index is 28.94 kg/(m^2).     Needed:  No  Language:   English    Patient will be contacted to schedule procedure.     Please be aware that coverage of these services is subject to the terms and limitations of your health insurance plan.  Call member services at your health plan with any benefit or coverage questions.  Any procedures must be performed at a [facility] OR coordinated by your clinic's referral office.    Please bring the following with you to your appointment:    (1) Any X-Rays, CTs or MRIs which have been performed.  Contact the facility where they were done to arrange for  prior to your scheduled appointment.    (2) List of current medications   (3) This referral request   (4) Any documents/labs given to you for this referral                  Follow-up notes from your care team     Return in about 3 months (around 12/25/2018).      Your next 10 appointments already scheduled     Oct 19, 2018  8:00 AM CDT   Level 2 with  INFUSION CHAIR 1   [facility] ([facility])    [facility]  [address]  [address]  [facility] [id number] [phone]              Future tests that were ordered for you today     Open Future Orders        Priority Expected Expires Ordered    Clostridium difficile toxin B PCR [MQC1296] Routine 9/25/2018 11/24/2018 9/25/2018    Ova and Parasite Exam Routine [FEX6082] Routine 9/25/2018 11/24/2018 9/25/2018    Giardia antigen [[id number]] Routine 9/25/2018 11/24/2018 9/25/2018    Enteric Bacteria and Virus Panel by KEVON Stool [[id number]] Routine 9/25/2018 11/24/2018 9/25/2018            Who to contact     Please call your clinic at [phone] to:    Ask questions about your health    Make or cancel appointments    Discuss your medicines    Learn about your test results    Speak to your doctor            Additional Information About Your Visit        MyChart Information     [employer] gives you secure access to your electronic health record. If you see a  primary care provider, you can also send messages to your care team and make appointments. If you have questions, please call your primary care clinic.  If you do not have a primary care provider, please call [phone] and they will assist you.      [email or web address] is an electronic gateway that provides easy, online access to your medical records. With [email or web address], you can request a clinic appointment, read your test results, renew a prescription or communicate with your care team.     To access your existing account, please contact your [facility] or call [phone] for assistance.        Care EveryWhere ID     This is your Care EveryWhere ID. This could be used by other organizations to access your [place] medical records  [id number]        Your Vitals Were     Pulse Temperature BMI (Body Mass Index)             68 97.5  F (36.4  C) (Oral) 28.94 kg/m2          Blood Pressure from Last 3 Encounters:   09/25/18 120/72   09/21/18 118/79   08/24/18 113/70    Weight from Last 3 Encounters:   09/25/18 78.9 kg (173 lb 14.4 oz)   08/24/18 78 kg (171 lb 14.4 oz)   06/24/18 72.6 kg (160 lb)              We Performed the Following     GASTROENTEROLOGY ADULT REF PROCEDURE ONLY          Today's Medication Changes          These changes are accurate as of 9/25/18  3:36 PM.  If you have any questions, ask your nurse or doctor.               Stop taking these medicines if you haven't already. Please contact your care team if you have questions.     CHINO 3-0.03 MG per tablet   Generic drug:  drospirenone-ethinyl estradiol   Stopped by:  Guero Arias PA-C                    Primary Care Provider Fax #    Physician No Ref-Primary [phone]       No address on file        Equal Access to Services     [facility] : Dayna Rosas, surinder varma, anu collado. So [facility] [phone].    ATENCIÓN: Si rocio gutierrez pham  disposición servicios gratuitos de asistencia lingüística. Mikael hollins [phone].    We comply with applicable federal civil rights laws and [place] laws. We do not discriminate on the basis of race, color, national origin, age, disability, sex, sexual orientation, or gender identity.            Thank you!     Thank you for choosing [facility]  for your care. Our goal is always to provide you with excellent care. Hearing back from our patients is one way we can continue to improve our services. Please take a few minutes to complete the written survey that you may receive in the mail after your visit with us. Thank you!             Your Updated Medication List - Protect others around you: Learn how to safely use, store and throw away your medicines at www.disposemymeds.org.          This list is accurate as of 9/25/18  3:36 PM.  Always use your most recent med list.                   Brand Name Dispense Instructions for use Diagnosis    ALBUTEROL IN      None Entered        ALBUTEROL SULFATE IN      None Entered        apixaban ANTICOAGULANT 5 MG tablet    ELIQUIS     Take 5 mg by mouth        IMURAN PO      Take 50 mg by mouth 2 times daily        multivitamin, therapeutic Tabs tablet      Take 1 tablet by mouth daily        vedolizumab 60 MG/ML injection    ENTYVIO

## 2018-09-25 NOTE — NURSING NOTE
Chief Complaint   Patient presents with     RECHECK     Follow up     Vitals:    09/25/18 1446   BP: 120/72   Pulse: 68   Temp: 97.5  F (36.4  C)   TempSrc: Oral   Weight: 173 lb 14.4 oz     Body mass index is 28.94 kg/(m^2).  Ruben Vanessa CMA

## 2018-09-25 NOTE — PATIENT INSTRUCTIONS
It was a pleasure taking care of you today.  I've included a brief summary of our discussion and care plan from today's visit below.  Please review this information with your primary care provider.  ______________________________________________________________________    My recommendations are summarized as follows:    -- Continue Entyvio every 4 weeks for now  -- Continue azathioprine 100 mg for now  -- Labs with your infusions  -- Stool when able   -- Next endoscopic assessment: colonoscopy in the near future  -- Patient with IBD we recommend supplementation vitamin D 1000 units daily and calcium 500 mg twice daily.  -- Vaccines/immunizations to be updated: all up dated  -- Yearly Dermatology visit for skin check while on immunosuppressive therapy. Can call [phone] to schedule.  -- Yearly pap smear while on immunosuppressive therapy  -- No NSAIDs (ibuprofen, or anything containing ibuprofen)     For additional resources about inflammatory bowel disease visit [email or web address]      Return to GI Clinic in 3 months to review your progress.    ______________________________________________________________________    Who do I call with any questions after my visit?  Please be in touch if there are any further questions that arise following today's visit.  There are multiple ways to contact your gastroenterology care team.        During business hours, you may reach a Gastroenterology nurse at [phone], option 3.       To schedule or reschedule an appointment, please call [phone].       You can always send a secure message through [employer].  [employer] messages are answered by your nurse or doctor typically within 24 hours.  Please allow extra time on weekends and holidays.        For urgent/emergent questions after business hours, you may reach the on-call GI Fellow by contacting the [facility] at [phone].      In order for your refill to be processed in a  timely fashion, it is your responsibility to ensure you follow the recommendations from your provider regarding your laboratory studies and follow up appointments.       How will I get the results of any tests ordered?    You will receive all of your results.  If you have signed up for [employer], any tests ordered at your visit will be available to you after your physician reviews them.  Typically this takes 1-2 weeks.  If there are urgent results that require a change in your care plan, your physician or nurse will call you to discuss the next steps.      What is [employer]?  [employer] is a secure way for you to access all of your healthcare records from the [facility].  It is a web based computer program, so you can sign on to it from any location.  It also allows you to send secure messages to your care team.  I recommend signing up for [employer] access if you have not already done so and are comfortable with using a computer.      How to I schedule a follow-up visit?  If you did not schedule a follow-up visit today, please call [phone] to schedule a follow-up office visit.        Sincerely,    Guero Arias PA-C  [facility]  Division of Gastroenterology

## 2018-09-25 NOTE — LETTER
9/25/2018       RE: Natalie Villa  [address]  [place] [address]     Dear Colleague,    Thank you for referring your patient, Natalie Villa, to the [facility] at [facility]. Please see a copy of my visit note below.    GI CLINIC VISIT - NEW PATIENT    CC/REFERRING PROVIDER: Referred Self  REASON FOR CONSULTATION: IBD follow up  COLLABORATING PHYSICIAN: Niraj Toth MD    HPI: 28 year old female with history of Crohn's vs US on Vedolizumab (failed adalimumab, and questionable antibodies to infliximab, now on vedolizumab since 6/19/2015) + AZA, initially diagnosed with Crohn's in 2013. Her symptoms at the time were bloody diarrhea, left sided abdominal pain and general fatigue. She was treated with Humira for 1 year but stopped after colonoscopy showed ongoing disease. Then was treated with Remicade for about 18 months but per patient report, thinks she developed antibodies to it. She was started on Vedolizumab in June of 2015 initially at 300 mg Q8 weeks which was increased to Q6 weeks and mostly recently to Q4 weeks in May 2018 after colonoscopy in April 2018 showed ongoing colitis. She has been on AZA since diagnosis, initially at 200 mg Qday which has been titrated down to 100 mg Qday.     She currently has has 4 BM/day that are either firm pellets or watery stools, without mucus or blood. She has no abdominal pain. Last time she experienced blood in the stool was August and she believes this was due to hemorrhoidal bleeding. She currently has one hemorrhoid (not thrombosed). She recovered from shingles with Valacyclovir this past summer.    She was diagnosed with a LLE DVT thought to be provoked and is currently on Eliquis.      Denies any tenesmus or insecurity passing flatus, no fecal incontinence or new perianal/nocturnal sxs noted. Denies any N/V/F/C/HA/NS or other const/syst/cardiopulmonary sxs, no BRBPR/melena/, no unintentional wt  loss or appetite/satiety changes. No other bowel/bladder habit changes, no dysphagia/odynophagia. No acholic stools/steatorrhea, no jt pain/oral ulcer/rash/eye sxs noted.    ROS: 10pt ROS performed and otherwise negative.    PERTINENT PAST MEDICAL HISTORY:  As noted above.    PREVIOUS ABDOMINAL/GYNECOLOGIC SURGERIES:  None     PREVIOUS ENDOSCOPY:  Colonoscopy 4/23/2018 at [facility]: Mild colitis starting at the transverse colon (Borrego 1), progressing to more moderate colitis in the rectosigmoid (Borrego 2). Bx showed chronic colitis. TI and right colon were unremarkable endoscopically and bx showed mild chronic activity.     PERTINENT MEDICATIONS:  - Azathioprine 100 mg Qday   - Vedolizumab 300 mg Q4W (last dose 9/21/18)  - Maria Victoria OCP     Medications reviewed with patient today, see Medication List/Assessment for details.  No other NSAID/anticoagulation reported by patient.  No other OTC/herbal/supplements reported by patient.    SOCIAL HISTORY:  - Works as   - Uses alcohol 1-2 times/month   - Smokes marijuana 2-3 times/week  - No tobacco   - Sexually active, uses condoms and OCPs as birth control     FAMILY HISTORY:  No colon/panc/esophageal/other GI CA, no other Valverde or other HPS-related Keo. No IBD/celiac, no other AI/liver/thyroid disease. + for asthma and acne.     ASSESSMENT/PLAN:    1. Colonic Crohn's Disease with concern for indeterminate IBD/phenotypic UC, on her third biologic therapy (hx of Humira, Remicade) with a relative rapid dose escalation, recent DVT concern for ongoing inflammation (among other factors), who presents for follow up:  - Repeat colonoscopy to assess for concerns for ongoing active inflammation given new onset of LLE DVT  - If active inflammation, optimize IBD therapy.  Likely increase AZA, given 6TG level was 138 (subtherapeutic) and may need to push to even 400 (some thought that increased levels, especially in someone who has used multiple biologics in the past may benefit to  higher levels around 400, beyond just therapeutic range).  -- If optimizing AZA is not effective, consider alternative biologic agents such as Stelara, given vedolizumab is already maximized.  Would likely not return to antiTNF given she did not have adequate response to adalimumab.  - For now, continue Entyvio 300mg IV Q4 weeks (dose adjusted to Q4W in May 2018)  - Continue AZA 100mg Qday. May need to optimize based on above  - Labs with infusions to include LFT, CBC ESR/CRP    2. LLE DVT:   thought to be provoked (factors include prolonged car ride from [place] to [place] when moved here this summer, strain to gastrocnemius muscle on crutches for 6 weeks, on birth control and active IBD) and is currently on Eliquis.  Concern is of course for ongoing active IBD, leading to a hypercoagulable state.  From an IBD perspective, we will plan to repeat a colonoscopy to evaluate response to Vedolizumab and optimization of dosing to every 4 weeks.  If ongoing inflammation, will need to optimize IBD regimen as stated above.  Plan for anticoagulation by PCP is continuing anticoagulation for 3 months with Eliquis and once we have more medical information about her family history, will determine if she needs to be on anticoagulation longer or if she needs further workup of possible genetic hypercoaguable state with a hematology referral. PCP is also repeating US in 3 months. She discontinued oral contraceptives.     3. Continue to monitor for the danger signs/symptoms we reviewed together today:  worsening abdominal pain, worsening diarrhea, obstructive symptoms (nausea/vomiting, abdominal distention, inability to pass stool/flatus), blood mixed into stools, persistent fevers/chills, progressive anemia (particulary with iron deficiency), difficulty with swallowing, perianal/rectal discomfort (particularly with defecation), unexpected weight loss, etc.  - Contact us via MyChart or phone should these symptoms occur, particularly as we  may advise further evaluation accordingly.    4. IBD health maintenance   Vaccinations:  -- Influenza (every year): Last given 2017  -- TdaP (every 10 years): Last given 2014  -- Pneumococcal Pneumonia (once then every 5 years): Last given 2015  -- Yearly assessment for latent Tb (verbal screening and exam, PPD or QuantiFERON-Tb testing): Indeterminate due to anergy 2017     One time confirmation of immunity or serologies:  -- Hepatitis A (serologies or immunizations): Unknown  -- Hepatitis B (serologies or immunizations): Vaccinated  -- Varicella: had chicken pox as a child  -- MMR: [place]   -- HPV (all aged 18-26): Up to date  -- Meningococcal meningitis (all patients at risk for meningitis): Unknown  -- Due to the immunosuppression in this patient, I would not advise administration of live vaccines such as varicella/VZV, intranasal influenza, MMR, or yellow fever vaccine (if travelling).      Bone mineral density screening   -- DEXA WNL 2015    Cancer Screening:  Colon cancer screening: due 2020  Cervical cancer screening: Annual due to immunosupression    PHYSICAL EXAMINATION:  Vitals reviewed, AFVSS  Wt 168# today   Gen: aaox3, cooperative, pleasant, not diaphoretic, nad  HEENT: ncat, neck supple, normal op w/o ulcer/exudate, anicteric, mmm  Resp/CV without acute findings, not dyspneic/tachycardic  Abd: Nondistended  Ext: no c/c/e  Skin: warm, perfused, no jaundice  Neuro: grossly intact    PERTINENT STUDIES:  9/21/18 labs show:  LFT WNL, alb 3.4  wbc 6, hgb 11.4, plt 296, mcv 93  ESR/CRP 6/2.6  Iron studies, B12, vit D WNL in 2016  3/14/18 fecal calprotectin was 2449    RTC in 3 months, sooner if symptomatic.        Again, thank you for allowing me to participate in the care of your patient.      Sincerely,    Guero Arias PA-C

## 2018-09-26 ENCOUNTER — TELEPHONE (OUTPATIENT)
Dept: GASTROENTEROLOGY | Facility: CLINIC | Age: 28
End: 2018-09-26

## 2018-09-27 ENCOUNTER — TELEPHONE (OUTPATIENT)
Dept: GASTROENTEROLOGY | Facility: OUTPATIENT CENTER | Age: 28
End: 2018-09-27

## 2018-09-27 DIAGNOSIS — Z12.11 ENCOUNTER FOR SCREENING COLONOSCOPY: Primary | ICD-10-CM

## 2018-09-27 NOTE — TELEPHONE ENCOUNTER
Patient taking any blood thinners ? Patient staying on Eliquis per Dr. Toth    Heart disease ? Denies     Lung disease ? Exercise induced asthma. Advised patient to bring inhaler      Sleep apnea ? Denies     Diabetic ? Denies     Kidney disease ? Denies     Dialysis ? n/a    Electronic implanted medical devices ? Denies     Are you taking any narcotic pain medication ? Denies   What is your daily dosage ?    PTSD ? n/a    Prep instructions reviewed with patient ? Instructions, policy,MAC sedation plan reviewed. Advised patient to have someone stay with them post exam.    Pharmacy : [facility]    Indication for procedure : Crohn's disease of large intestine without complication (H) [K50.10]     Referring provider :Guero Arias PA-C     Arrival Time : 7:30 AM    Patient states she is not pregnant or lactating

## 2018-09-27 NOTE — PROGRESS NOTES
Patient requested that her infusions be given +/- 3 days due to work schedule. Infusion therapy plan has been changed

## 2018-10-02 ENCOUNTER — TRANSFERRED RECORDS (OUTPATIENT)
Dept: HEALTH INFORMATION MANAGEMENT | Facility: CLINIC | Age: 28
End: 2018-10-02

## 2018-10-02 ENCOUNTER — DOCUMENTATION ONLY (OUTPATIENT)
Dept: GASTROENTEROLOGY | Facility: OUTPATIENT CENTER | Age: 28
End: 2018-10-02
Payer: COMMERCIAL

## 2018-10-03 LAB — COPATH REPORT: NORMAL

## 2018-10-16 ASSESSMENT — ENCOUNTER SYMPTOMS
BOWEL INCONTINENCE: 0
SORE THROAT: 1
BLOOD IN STOOL: 0
COUGH: 1
CONSTIPATION: 0
POSTURAL DYSPNEA: 0
DYSPNEA ON EXERTION: 0
SINUS CONGESTION: 1
VOMITING: 0
SNORES LOUDLY: 0
RECTAL PAIN: 0
WHEEZING: 0
COUGH DISTURBING SLEEP: 1
BLOATING: 1
DIARRHEA: 1
NAUSEA: 0
NECK MASS: 0
HOARSE VOICE: 1
SHORTNESS OF BREATH: 0
TROUBLE SWALLOWING: 0
SINUS PAIN: 0
TASTE DISTURBANCE: 0
SPUTUM PRODUCTION: 1
HEMOPTYSIS: 0
SMELL DISTURBANCE: 0
ABDOMINAL PAIN: 0
JAUNDICE: 0
HEARTBURN: 0

## 2018-10-19 ENCOUNTER — HOSPITAL ENCOUNTER (OUTPATIENT)
Facility: CLINIC | Age: 28
Setting detail: SPECIMEN
Discharge: HOME | End: 2018-10-19
Attending: INTERNAL MEDICINE | Admitting: INTERNAL MEDICINE
Payer: COMMERCIAL

## 2018-10-19 ENCOUNTER — INFUSION THERAPY VISIT (OUTPATIENT)
Dept: INFUSION THERAPY | Facility: CLINIC | Age: 28
End: 2018-10-19
Attending: INTERNAL MEDICINE
Payer: COMMERCIAL

## 2018-10-19 ENCOUNTER — TELEPHONE (OUTPATIENT)
Dept: GASTROENTEROLOGY | Facility: CLINIC | Age: 28
End: 2018-10-19

## 2018-10-19 VITALS
RESPIRATION RATE: 16 BRPM | TEMPERATURE: 97.6 F | OXYGEN SATURATION: 100 % | DIASTOLIC BLOOD PRESSURE: 70 MMHG | BODY MASS INDEX: 29.12 KG/M2 | HEART RATE: 72 BPM | SYSTOLIC BLOOD PRESSURE: 118 MMHG | WEIGHT: 175 LBS

## 2018-10-19 DIAGNOSIS — K50.10 CROHN'S DISEASE OF LARGE INTESTINE WITHOUT COMPLICATION (H): Primary | ICD-10-CM

## 2018-10-19 LAB
ALBUMIN SERPL-MCNC: 3.4 G/DL (ref 3.4–5)
ALP SERPL-CCNC: 79 U/L (ref 40–150)
ALT SERPL W P-5'-P-CCNC: 12 U/L (ref 0–50)
AST SERPL W P-5'-P-CCNC: 13 U/L (ref 0–45)
BASOPHILS # BLD AUTO: 0.1 10E9/L (ref 0–0.2)
BASOPHILS NFR BLD AUTO: 1 %
BILIRUB DIRECT SERPL-MCNC: 0.1 MG/DL (ref 0–0.2)
BILIRUB SERPL-MCNC: 0.6 MG/DL (ref 0.2–1.3)
CRP SERPL-MCNC: <2.9 MG/L (ref 0–8)
DIFFERENTIAL METHOD BLD: ABNORMAL
EOSINOPHIL # BLD AUTO: 0.5 10E9/L (ref 0–0.7)
EOSINOPHIL NFR BLD AUTO: 10 %
ERYTHROCYTE [DISTWIDTH] IN BLOOD BY AUTOMATED COUNT: 12 % (ref 10–15)
ERYTHROCYTE [SEDIMENTATION RATE] IN BLOOD BY WESTERGREN METHOD: 13 MM/H (ref 0–20)
HCT VFR BLD AUTO: 36 % (ref 35–47)
HGB BLD-MCNC: 11.5 G/DL (ref 11.7–15.7)
IMM GRANULOCYTES # BLD: 0 10E9/L (ref 0–0.4)
IMM GRANULOCYTES NFR BLD: 0.4 %
LYMPHOCYTES # BLD AUTO: 1.3 10E9/L (ref 0.8–5.3)
LYMPHOCYTES NFR BLD AUTO: 26.2 %
MCH RBC QN AUTO: 29.9 PG (ref 26.5–33)
MCHC RBC AUTO-ENTMCNC: 31.9 G/DL (ref 31.5–36.5)
MCV RBC AUTO: 94 FL (ref 78–100)
MONOCYTES # BLD AUTO: 0.5 10E9/L (ref 0–1.3)
MONOCYTES NFR BLD AUTO: 10.6 %
NEUTROPHILS # BLD AUTO: 2.6 10E9/L (ref 1.6–8.3)
NEUTROPHILS NFR BLD AUTO: 51.8 %
NRBC # BLD AUTO: 0 10*3/UL
NRBC BLD AUTO-RTO: 0 /100
PLATELET # BLD AUTO: 294 10E9/L (ref 150–450)
PROT SERPL-MCNC: 8.2 G/DL (ref 6.8–8.8)
RBC # BLD AUTO: 3.84 10E12/L (ref 3.8–5.2)
WBC # BLD AUTO: 5.1 10E9/L (ref 4–11)

## 2018-10-19 PROCEDURE — 85025 COMPLETE CBC W/AUTO DIFF WBC: CPT | Performed by: INTERNAL MEDICINE

## 2018-10-19 PROCEDURE — 86140 C-REACTIVE PROTEIN: CPT | Performed by: INTERNAL MEDICINE

## 2018-10-19 PROCEDURE — 80076 HEPATIC FUNCTION PANEL: CPT | Performed by: INTERNAL MEDICINE

## 2018-10-19 PROCEDURE — 25000128 H RX IP 250 OP 636: Performed by: INTERNAL MEDICINE

## 2018-10-19 PROCEDURE — 85652 RBC SED RATE AUTOMATED: CPT | Performed by: INTERNAL MEDICINE

## 2018-10-19 PROCEDURE — 96365 THER/PROPH/DIAG IV INF INIT: CPT

## 2018-10-19 RX ADMIN — VEDOLIZUMAB 300 MG: 300 INJECTION, POWDER, LYOPHILIZED, FOR SOLUTION INTRAVENOUS at 08:34

## 2018-10-19 RX ADMIN — SODIUM CHLORIDE 250 ML: 9 INJECTION, SOLUTION INTRAVENOUS at 08:34

## 2018-10-19 NOTE — MR AVS SNAPSHOT
After Visit Summary   10/19/2018    Natalie Villa    MRN: [id number]           Patient Information     Date Of Birth          1990        Visit Information        Provider Department      10/19/2018 8:00 AM RH INFUSION CHAIR 1 [facility]        Today's Diagnoses     Crohn's disease of large intestine without complication (H)    -  1       Follow-ups after your visit        Your next 10 appointments already scheduled     Oct 24, 2018  2:20 PM CDT   (Arrive by 2:05 PM)   RETURN INFLAMMATORY BOWEL DISEASE with [facility] ([facility])    [address] [address]   [phone]            Nov 13, 2018  8:00 AM CST   Level 2 with RH INFUSION CHAIR 1   [facility] Infusion [facility] ([facility])    [facility]  [address]  [facility] [address]   [phone]            Dec 14, 2018  8:00 AM CST   Level 2 with  INFUSION CHAIR 12   [facility] Infusion [facility] ([facility])    [facility]  [address]  [address]  [facility] [address]   [phone]            Dec 17, 2018  7:00 AM CST   (Arrive by 6:45 AM)   RETURN INFLAMMATORY BOWEL DISEASE with [facility] ([facility])    [address] [id number] [phone]              Future tests that were ordered for you today     Open Standing Orders        Priority Remaining Interval Expires Ordered    Notify Physician Routine [id number] PRN  10/19/2018            Who to contact     If you have questions or need follow up information about today's clinic visit or your schedule please contact [facility] INFUSION [facility] directly at [phone].  Normal or  non-critical lab and imaging results will be communicated to you by MyChart, letter or phone within 4 business days after the clinic has received the results. If you do not hear from us within 7 days, please contact the clinic through [facility] [employer] or phone. If you have a critical or abnormal lab result, we will notify you by phone as soon as possible.  Submit refill requests through [employer] or call your pharmacy and they will forward the refill request to us. Please allow 3 business days for your refill to be completed.          Additional Information About Your Visit        [facility] Information     [facility] [employer] gives you secure access to your electronic health record. If you see a primary care provider, you can also send messages to your care team and make appointments. If you have questions, please call your primary care clinic.  If you do not have a primary care provider, please call [phone] and they will assist you.        Care EveryWhere ID     This is your Care EveryWhere ID. This could be used by other organizations to access your [facility] medical records  [id number]        Your Vitals Were     Pulse Temperature Respirations Pulse Oximetry BMI (Body Mass Index)       72 97.6  F (36.4  C) (Tympanic) 16 100% 29.12 kg/m2        Blood Pressure from Last 3 Encounters:   10/19/18 118/70   09/25/18 120/72   09/21/18 118/79    Weight from Last 3 Encounters:   10/19/18 79.4 kg (175 lb)   09/25/18 78.9 kg (173 lb 14.4 oz)   08/24/18 78 kg (171 lb 14.4 oz)              We Performed the Following     CBC with platelets differential     CRP inflammation     Erythrocyte sedimentation rate auto     Hepatic panel        Primary Care Provider Fax #    Physician No Ref-Primary [phone]       No address on file        Equal Access to Services     IMKE CADET : Dayna Rosas, surinder varma, anu collado. So [facility] [phone].    ATENCIÓN: Alysha carter  español, tiene a pham disposición servicios gratuitos de asistencia lingüística. Mikael hollins [phone].    We comply with applicable federal civil rights laws and [place] laws. We do not discriminate on the basis of race, color, national origin, age, disability, sex, sexual orientation, or gender identity.            Thank you!     Thank you for choosing [facility]  for your care. Our goal is always to provide you with excellent care. Hearing back from our patients is one way we can continue to improve our services. Please take a few minutes to complete the written survey that you may receive in the mail after your visit with us. Thank you!             Your Updated Medication List - Protect others around you: Learn how to safely use, store and throw away your medicines at www.disposemymeds.org.          This list is accurate as of 10/19/18  9:15 AM.  Always use your most recent med list.                   Brand Name Dispense Instructions for use Diagnosis    ALBUTEROL IN      None Entered        ALBUTEROL SULFATE IN      None Entered        apixaban ANTICOAGULANT 5 MG tablet    ELIQUIS     Take 5 mg by mouth        IMURAN PO      Take 50 mg by mouth 2 times daily        multivitamin, therapeutic Tabs tablet      Take 1 tablet by mouth daily        vedolizumab 60 MG/ML injection    ENTYVIO

## 2018-10-19 NOTE — PROGRESS NOTES
Infusion Nursing Note:  Natalie Villa presents today for [place].    Patient seen by provider today: No   present during visit today: Not Applicable.    Note: N/A.    Intravenous Access:  Labs drawn without difficulty.  Peripheral IV placed.    Treatment Conditions:  Lab Results   Component Value Date    HGB 11.5 10/19/2018     Lab Results   Component Value Date    WBC 5.1 10/19/2018      Lab Results   Component Value Date    ANEU 2.6 10/19/2018     Lab Results   Component Value Date     10/19/2018      Lab Results   Component Value Date     07/09/2018                   Lab Results   Component Value Date    POTASSIUM 4.0 07/09/2018           No results found for: MAG         Lab Results   Component Value Date    CR 0.68 07/09/2018                   Lab Results   Component Value Date    ELIEL 9.0 07/09/2018                Lab Results   Component Value Date    BILITOTAL 0.6 10/19/2018           Lab Results   Component Value Date    ALBUMIN 3.4 10/19/2018                    Lab Results   Component Value Date    ALT 12 10/19/2018           Lab Results   Component Value Date    AST 13 10/19/2018       Results reviewed, labs MET treatment parameters, ok to proceed with treatment.  Biological Infusion Checklist:    ~~~ NOTE: If the patient answers yes to any of the questions below, hold the infusion and contact ordering provider or on-call provider.    1. Have you recently had an elevated temperature, fever, chills, productive cough, coughing for 3 weeks or longer or hemoptysis,  abnormal vital signs, night sweats,  chest pain or have you noticed a decrease in your appetite, unexplained weight loss or fatigue? No  2. Do you have any open wounds or new incisions? No  3. Do you have any recent or upcoming hospitalizations, surgeries or dental procedures? No  4. Do you currently have or recently have had any signs of illness or infection or are you on any antibiotics? No  5. Have you had any new, sudden  or worsening abdominal pain? No  6. Have you or anyone in your household received a live vaccination in the past 4 weeks? Please note:  No live vaccines while on biologic/chemotherapy until 6 months after the last treatment.  Patient can receive the flu vaccine (shot only) and the pneumovax.  It is optimal for the patient to get these vaccines mid cycle, but they can be given at any time as long as it is not on the day of the infusion. No  7. Have you recently been diagnosed with any new nervous system diseases (ie. Multiple sclerosis, Guillain Ririe, seizures, neurological changes) or cancer diagnosis? Are you on any form of radiation or chemotherapy? No  8. Are you pregnant or breast feeding or do you have plans of pregnancy in the future? No  9. Have you been having any signs of worsening depression or suicidal ideations?  (benlysta only) No  10. Have there been any other new onset medical symptoms? No        Post Infusion Assessment:  Patient tolerated infusion without incident.  Blood return noted pre and post infusion.  Site patent and intact, free from redness, edema or discomfort.  No evidence of extravasations.  Access discontinued per protocol.  Biologic Infusion Post Education: Call the triage nurse at your clinic or seek medical attention if you have chills and/or temperature greater than or equal to 100.5, uncontrolled nausea/vomiting, diarrhea, constipation, dizziness, shortness of breath, chest pain, heart palpitations, weakness or any other new or concerning symptoms, questions or concerns.  You cannot have any live virus vaccines prior to or during treatment or up to 6 months post infusion.  If you have an upcoming surgery, medical procedure or dental procedure during treatment, this should be discussed with your ordering physician and your surgeon/dentist.  If you are having any concerning symptom, if you are unsure if you should get your next infusion or wish to speak to a provider before your  next infusion, please call your care coordinator or triage nurse at your clinic to notify them so we can adequately serve you.    Discharge Plan:   Patient declined prescription refills.  Discharge instructions reviewed with: Patient.  Patient and/or family verbalized understanding of discharge instructions and all questions answered.  Copy of AVS reviewed with patient and/or family.  Patient will return 11/13/18 for next appointment.  Patient discharged in stable condition accompanied by: yamilex.  Departure Mode: Ambulatory.    Felisa Samano RN

## 2018-10-24 ENCOUNTER — OFFICE VISIT (OUTPATIENT)
Dept: GASTROENTEROLOGY | Facility: CLINIC | Age: 28
End: 2018-10-24
Payer: COMMERCIAL

## 2018-10-24 VITALS
DIASTOLIC BLOOD PRESSURE: 72 MMHG | TEMPERATURE: 98.5 F | OXYGEN SATURATION: 99 % | HEART RATE: 70 BPM | WEIGHT: 176.6 LBS | SYSTOLIC BLOOD PRESSURE: 117 MMHG | BODY MASS INDEX: 29.39 KG/M2

## 2018-10-24 DIAGNOSIS — K50.10 CROHN'S DISEASE OF LARGE INTESTINE WITHOUT COMPLICATION (H): Primary | ICD-10-CM

## 2018-10-24 RX ORDER — AZATHIOPRINE 50 MG/1
75 TABLET ORAL 2 TIMES DAILY
Qty: 180 TABLET | Refills: 1 | Status: SHIPPED | OUTPATIENT
Start: 2018-10-24 | End: 2019-03-01

## 2018-10-24 ASSESSMENT — PAIN SCALES - GENERAL: PAINLEVEL: NO PAIN (0)

## 2018-10-24 NOTE — LETTER
10/24/2018       RE: Natalie Villa  [address]  [place] [address]     Dear Colleague,    Thank you for referring your patient, Natalie Villa, to the [facility] GASTRO[facility] IBD [facility]INIC at [facility]. Please see a copy of my visit note below.    GI CLINIC VISIT - NEW PATIENT    CC/REFERRING PROVIDER: Referred Self  REASON FOR CONSULTATION: IBD follow up  COLLABORATING PHYSICIAN: Niraj Toth MD    HPI: 28 year old female with history of Crohn's vs UC on Vedolizumab (failed adalimumab, and questionable antibodies to infliximab, now on vedolizumab since 6/19/2015) + AZA, initially diagnosed with Crohn's in 2013. Her symptoms at the time were bloody diarrhea, left sided abdominal pain and general fatigue. She was treated with Humira for 1 year but stopped after colonoscopy showed ongoing disease. Then was treated with Remicade for about 18 months but per patient report, thinks she developed antibodies to it. She was started on Vedolizumab in June of 2015 initially at 300 mg Q8 weeks which was increased to Q6 weeks and mostly recently to Q4 weeks in May 2018 after colonoscopy in April 2018 showed ongoing colitis. She has been on AZA since diagnosis, initially at 200 mg Qday which has been titrated down to 100 mg Qday.     She currently has has 3-4 BM/day that are soft and formed. She denies any blood, but occasional mucus.  Notes increase in more loose stool with recent excess ETOH intake. She has no abdominal pain. Last time she experienced blood in the stool was August and she believes this was due to hemorrhoidal bleeding. She currently has one hemorrhoid (not thrombosed). She recovered from shingles with Valacyclovir this past summer.    She was diagnosed with a LLE DVT thought to be provoked and is currently on Eliquis.      Denies any tenesmus or insecurity passing flatus, no fecal incontinence or new perianal/nocturnal sxs noted. Denies any N/V/F/C/HA/NS or other  const/syst/cardiopulmonary sxs, no BRBPR/melena/, no unintentional wt loss or appetite/satiety changes. No other bowel/bladder habit changes, no dysphagia/odynophagia. No acholic stools/steatorrhea, no jt pain/oral ulcer/rash/eye sxs noted.    ROS: 10pt ROS performed and otherwise negative.    PERTINENT PAST MEDICAL HISTORY:  As noted above.    PREVIOUS ABDOMINAL/GYNECOLOGIC SURGERIES:  None     PREVIOUS ENDOSCOPY:  Colonoscopy 4/23/2018 at [facility]: Mild colitis starting at the transverse colon (Borrego 1), progressing to more moderate colitis in the rectosigmoid (Borrego 2). Bx showed chronic colitis. TI and right colon were unremarkable endoscopically and bx showed mild chronic activity.     Flex sig 10/2/2018 shows Borrego 2/3, continuous, biopsies show moderate chronic active colitis.    PERTINENT MEDICATIONS:  - Azathioprine 100 mg Qday   - Vedolizumab 300 mg Q4W (last dose 9/21/18)  - Maria Victoria OCP   Medications reviewed with patient today, see Medication List/Assessment for details.  No other NSAID/anticoagulation reported by patient.  No other OTC/herbal/supplements reported by patient.    SOCIAL HISTORY:  - Works as   - Uses alcohol 1-2 times/month   - Smokes marijuana 2-3 times/week  - No tobacco   - Sexually active, uses condoms and OCPs as birth control     FAMILY HISTORY:  No colon/panc/esophageal/other GI CA, no other Valverde or other HPS-related Keo. No IBD/celiac, no other AI/liver/thyroid disease. + for asthma and acne.     ASSESSMENT/PLAN:    1. Colonic Crohn's Disease with concern for indeterminate IBD/phenotypic UC, on her third biologic therapy (hx of Humira, Remicade) with a relative rapid dose escalation, recent DVT concern for ongoing inflammation (among other factors), who presents for follow up:  -Discussed ongoing concern for multiple biologic use and ongoing active disease currently on dual immunosuppressive therapy and on her third biologic.  -Based on thiopurine levels, could increase  azathioprine dose to optimize this further and will plan to increase to 75 mg twice daily (from 50 mg twice daily). May need to push to even 400 (some thought that increased levels, especially in someone who has used multiple biologics in the past may benefit to higher levels around 400, beyond just therapeutic range).  -Obtain baseline fecal calprotectin today  -After 3 months of increasing azathioprine, plan to repeat metabolites and fecal calprotectin to see if any change from baseline.  -- If optimizing AZA is not effective, medical therapy would be limited to consider alternative biologic agents such as Stelara, given vedolizumab is already maximized.  Would likely not return to antiTNF given she did not have adequate response to adalimumab.  Surgical therapy should also be strongly considered.  - For now, continue Entyvio 300mg IV Q4 weeks (dose adjusted to Q4W in May 2018)  - Increase AZA 75 BID   - Labs with infusions to include LFT, CBC ESR/CRP    2. LLE DVT:   thought to be provoked (factors include prolonged car ride from [place] to [place] when moved here this summer, strain to gastrocnemius muscle on crutches for 6 weeks, on birth control and active IBD) and is currently on Eliquis.  Concern is of course for ongoing active IBD, leading to a hypercoagulable state.  We will continue to work to optimize IBD regimen as stated above.  Plan for anticoagulation by PCP is continuing anticoagulation for total of 3 months with Eliquis and once we have more medical information about her family history, will determine if she needs to be on anticoagulation longer or if she needs further workup of possible genetic hypercoaguable state with a hematology referral. PCP is also repeating US in 3 months. She discontinued oral contraceptives.     3. Continue to monitor for the danger signs/symptoms we reviewed together today:  worsening abdominal pain, worsening diarrhea, obstructive symptoms (nausea/vomiting, abdominal  distention, inability to pass stool/flatus), blood mixed into stools, persistent fevers/chills, progressive anemia (particulary with iron deficiency), difficulty with swallowing, perianal/rectal discomfort (particularly with defecation), unexpected weight loss, etc.  - Contact us via MyChart or phone should these symptoms occur, particularly as we may advise further evaluation accordingly.    4. IBD health maintenance   Vaccinations:  -- Influenza (every year): Last given 2017  -- TdaP (every 10 years): Last given 2014  -- Pneumococcal Pneumonia (once then every 5 years): Last given 2015  -- Yearly assessment for latent Tb (verbal screening and exam, PPD or QuantiFERON-Tb testing): Indeterminate due to anergy 2017     One time confirmation of immunity or serologies:  -- Hepatitis A (serologies or immunizations): Unknown  -- Hepatitis B (serologies or immunizations): Vaccinated  -- Varicella: had chicken pox as a child  -- MMR: Unknwon   -- HPV (all aged 18-26): Up to date  -- Meningococcal meningitis (all patients at risk for meningitis): Unknown  -- Due to the immunosuppression in this patient, I would not advise administration of live vaccines such as varicella/VZV, intranasal influenza, MMR, or yellow fever vaccine (if travelling).      Bone mineral density screening   -- DEXA WNL 2015    Cancer Screening:  Colon cancer screening: due 2020  Cervical cancer screening: Annual due to immunosupression    PHYSICAL EXAMINATION:  Vitals reviewed, AFVSS  Wt 168# today   Gen: aaox3, cooperative, pleasant, not diaphoretic, nad  HEENT: ncat, neck supple, normal op w/o ulcer/exudate, anicteric, mmm  Resp/CV without acute findings, not dyspneic/tachycardic  Abd: Nondistended  Ext: no c/c/e  Skin: warm, perfused, no jaundice  Neuro: grossly intact    PERTINENT STUDIES:  10/19/18 labs show:  LFT WNL, alb 3.4  wbc 5.1, hgb 11.5, plt 294, mcv 94  ESR/CRP 13/2.9  Iron studies, B12, vit D WNL in 2016  3/14/18 fecal calprotectin was  2449  6TG 138  6MMP <500    RTC in 3 months, sooner if symptomatic.      Guero Arias PA-C  Division of Gastroenterology, Hepatology and Nutrition  [facility]       Answers for HPI/ROS submitted by the patient on 10/16/2018   General Symptoms: No  Skin Symptoms: No  HENT Symptoms: Yes  EYE SYMPTOMS: No  HEART SYMPTOMS: No  LUNG SYMPTOMS: Yes  INTESTINAL SYMPTOMS: Yes  URINARY SYMPTOMS: No  GYNECOLOGIC SYMPTOMS: No  BREAST SYMPTOMS: No  SKELETAL SYMPTOMS: No  BLOOD SYMPTOMS: No  NERVOUS SYSTEM SYMPTOMS: No  MENTAL HEALTH SYMPTOMS: No  Ear pain: No  Ear discharge: No  Hearing loss: No  Tinnitus: No  Nosebleeds: No  Congestion: Yes  Sinus pain: No  Trouble swallowing: No   Voice hoarseness: Yes  Mouth sores: No  Sore throat: Yes  Tooth pain: No  Gum tenderness: No  Bleeding gums: No  Change in taste: No  Change in sense of smell: No  Dry mouth: No  Hearing aid used: No  Neck lump: No  Cough: Yes  Sputum or phlegm: Yes  Coughing up blood: No  Difficulty breating or shortness of breath: No  Snoring: No  Wheezing: No  Difficulty breathing on exertion: No  Nighttime Cough: Yes  Difficulty breathing when lying flat: No  Heart burn or indigestion: No  Nausea: No  Vomiting: No  Abdominal pain: No  Bloating: Yes  Constipation: No  Diarrhea: Yes  Blood in stool: No  Black stools: No  Rectal or Anal pain: No  Fecal incontinence: No  Yellowing of skin or eyes: No  Vomit with blood: No  Change in stools: No      Again, thank you for allowing me to participate in the care of your patient.      Sincerely,    Guero Arias PA-C

## 2018-10-24 NOTE — LETTER
Date:October 25, 2018      Patient was self referred, no letter generated. Do not send.        [facility] Health Information

## 2018-10-24 NOTE — PATIENT INSTRUCTIONS
It was a pleasure taking care of you today.  I've included a brief summary of our discussion and care plan from today's visit below.  Please review this information with your primary care provider.  ______________________________________________________________________    My recommendations are summarized as follows:    -- Continue entyvio every 4 weeks  -- INCREASE azathioprine to 75 mg twice daily (or 150 mg daily)  -- Labs with infusions  -- Stool when able   -- Patient with IBD we recommend supplementation vitamin D 1000 units daily and calcium 500 mg twice daily.  -- Vaccines/immunizations to be updated: flu shot  -- Yearly Dermatology visit for skin check while on immunosuppressive therapy. Can call [phone] to schedule.  -- Yearly pap smear while on immunosuppressive therapy  -- No NSAIDs (ibuprofen, or anything containing ibuprofen)       For additional resources about inflammatory bowel disease visit [email or web address]      Return to GI Clinic in 3 months to review your progress.    ______________________________________________________________________    Who do I call with any questions after my visit?  Please be in touch if there are any further questions that arise following today's visit.  There are multiple ways to contact your gastroenterology care team.        During business hours, you may reach a Gastroenterology nurse at [phone], option [phone].       To schedule or reschedule an appointment, please call [phone].       You can always send a secure message through Tripeese.  [employer] messages are answered by your nurse or doctor typically within 24 hours.  Please allow extra time on weekends and holidays.        For urgent/emergent questions after business hours, you may reach the on-call GI Fellow by contacting the [facility] at [phone].      In order for your refill to be processed in a timely fashion, it is your responsibility to ensure you  follow the recommendations from your provider regarding your laboratory studies and follow up appointments.       How will I get the results of any tests ordered?    You will receive all of your results.  If you have signed up for [employer], any tests ordered at your visit will be available to you after your physician reviews them.  Typically this takes 1-2 weeks.  If there are urgent results that require a change in your care plan, your physician or nurse will call you to discuss the next steps.      What is Playteau?  Playteau is a secure way for you to access all of your healthcare records from the [facility].  It is a web based computer program, so you can sign on to it from any location.  It also allows you to send secure messages to your care team.  I recommend signing up for Playteau access if you have not already done so and are comfortable with using a computer.      How to I schedule a follow-up visit?  If you did not schedule a follow-up visit today, please call [phone] to schedule a follow-up office visit.        Sincerely,    Guero Arias PA-C  [facility]  Division of Gastroenterology

## 2018-10-24 NOTE — PROGRESS NOTES
GI CLINIC VISIT - NEW PATIENT    CC/REFERRING PROVIDER: Referred Self  REASON FOR CONSULTATION: IBD follow up  COLLABORATING PHYSICIAN: Niraj Toth MD    HPI: 28 year old female with history of Crohn's vs UC on Vedolizumab (failed adalimumab, and questionable antibodies to infliximab, now on vedolizumab since 6/19/2015) + AZA, initially diagnosed with Crohn's in 2013. Her symptoms at the time were bloody diarrhea, left sided abdominal pain and general fatigue. She was treated with Humira for 1 year but stopped after colonoscopy showed ongoing disease. Then was treated with Remicade for about 18 months but per patient report, thinks she developed antibodies to it. She was started on Vedolizumab in June of 2015 initially at 300 mg Q8 weeks which was increased to Q6 weeks and mostly recently to Q4 weeks in May 2018 after colonoscopy in April 2018 showed ongoing colitis. She has been on AZA since diagnosis, initially at 200 mg Qday which has been titrated down to 100 mg Qday.     She currently has has 3-4 BM/day that are soft and formed. She denies any blood, but occasional mucus.  Notes increase in more loose stool with recent excess ETOH intake. She has no abdominal pain. Last time she experienced blood in the stool was August and she believes this was due to hemorrhoidal bleeding. She currently has one hemorrhoid (not thrombosed). She recovered from shingles with Valacyclovir this past summer.    She was diagnosed with a LLE DVT thought to be provoked and is currently on Eliquis.      Denies any tenesmus or insecurity passing flatus, no fecal incontinence or new perianal/nocturnal sxs noted. Denies any N/V/F/C/HA/NS or other const/syst/cardiopulmonary sxs, no BRBPR/melena/, no unintentional wt loss or appetite/satiety changes. No other bowel/bladder habit changes, no dysphagia/odynophagia. No acholic stools/steatorrhea, no jt pain/oral ulcer/rash/eye sxs noted.    ROS: 10pt ROS performed and otherwise  negative.    PERTINENT PAST MEDICAL HISTORY:  As noted above.    PREVIOUS ABDOMINAL/GYNECOLOGIC SURGERIES:  None     PREVIOUS ENDOSCOPY:  Colonoscopy 4/23/2018 at [facility]: Mild colitis starting at the transverse colon (Borrego 1), progressing to more moderate colitis in the rectosigmoid (Borrego 2). Bx showed chronic colitis. TI and right colon were unremarkable endoscopically and bx showed mild chronic activity.     Flex sig 10/2/2018 shows Borrego 2/3, continuous, biopsies show moderate chronic active colitis.    PERTINENT MEDICATIONS:  - Azathioprine 100 mg Qday   - Vedolizumab 300 mg Q4W (last dose 9/21/18)  - Maria Victoria OCP   Medications reviewed with patient today, see Medication List/Assessment for details.  No other NSAID/anticoagulation reported by patient.  No other OTC/herbal/supplements reported by patient.    SOCIAL HISTORY:  - Works as   - Uses alcohol 1-2 times/month   - Smokes marijuana 2-3 times/week  - No tobacco   - Sexually active, uses condoms and OCPs as birth control     FAMILY HISTORY:  No colon/panc/esophageal/other GI CA, no other Valverde or other HPS-related Keo. No IBD/celiac, no other AI/liver/thyroid disease. + for asthma and acne.     ASSESSMENT/PLAN:    1. Colonic Crohn's Disease with concern for indeterminate IBD/phenotypic UC, on her third biologic therapy (hx of Humira, Remicade) with a relative rapid dose escalation, recent DVT concern for ongoing inflammation (among other factors), who presents for follow up:  -Discussed ongoing concern for multiple biologic use and ongoing active disease currently on dual immunosuppressive therapy and on her third biologic.  -Based on thiopurine levels, could increase azathioprine dose to optimize this further and will plan to increase to 75 mg twice daily (from 50 mg twice daily). May need to push to even 400 (some thought that increased levels, especially in someone who has used multiple biologics in the past may benefit to higher levels around  400, beyond just therapeutic range).  -Obtain baseline fecal calprotectin today  -After 3 months of increasing azathioprine, plan to repeat metabolites and fecal calprotectin to see if any change from baseline.  -- If optimizing AZA is not effective, medical therapy would be limited to consider alternative biologic agents such as Stelara, given vedolizumab is already maximized.  Would likely not return to antiTNF given she did not have adequate response to adalimumab.  Surgical therapy should also be strongly considered.  - For now, continue Entyvio 300mg IV Q4 weeks (dose adjusted to Q4W in May 2018)  - Increase AZA 75 BID   - Labs with infusions to include LFT, CBC ESR/CRP    2. LLE DVT:   thought to be provoked (factors include prolonged car ride from [place] to [place] when moved here this summer, strain to gastrocnemius muscle on crutches for 6 weeks, on birth control and active IBD) and is currently on Eliquis.  Concern is of course for ongoing active IBD, leading to a hypercoagulable state.  We will continue to work to optimize IBD regimen as stated above.  Plan for anticoagulation by PCP is continuing anticoagulation for total of 3 months with Eliquis and once we have more medical information about her family history, will determine if she needs to be on anticoagulation longer or if she needs further workup of possible genetic hypercoaguable state with a hematology referral. PCP is also repeating US in 3 months. She discontinued oral contraceptives.     3. Continue to monitor for the danger signs/symptoms we reviewed together today:  worsening abdominal pain, worsening diarrhea, obstructive symptoms (nausea/vomiting, abdominal distention, inability to pass stool/flatus), blood mixed into stools, persistent fevers/chills, progressive anemia (particulary with iron deficiency), difficulty with swallowing, perianal/rectal discomfort (particularly with defecation), unexpected weight loss, etc.  - Contact us via  MyChart or phone should these symptoms occur, particularly as we may advise further evaluation accordingly.    4. IBD health maintenance   Vaccinations:  -- Influenza (every year): Last given 2017  -- TdaP (every 10 years): Last given 2014  -- Pneumococcal Pneumonia (once then every 5 years): Last given 2015  -- Yearly assessment for latent Tb (verbal screening and exam, PPD or QuantiFERON-Tb testing): Indeterminate due to anergy 2017     One time confirmation of immunity or serologies:  -- Hepatitis A (serologies or immunizations): Unknown  -- Hepatitis B (serologies or immunizations): Vaccinated  -- Varicella: had chicken pox as a child  -- MMR: Unknwon   -- HPV (all aged 18-26): Up to date  -- Meningococcal meningitis (all patients at risk for meningitis): Unknown  -- Due to the immunosuppression in this patient, I would not advise administration of live vaccines such as varicella/VZV, intranasal influenza, MMR, or yellow fever vaccine (if travelling).      Bone mineral density screening   -- DEXA WNL 2015    Cancer Screening:  Colon cancer screening: due 2020  Cervical cancer screening: Annual due to immunosupression    PHYSICAL EXAMINATION:  Vitals reviewed, AFVSS  Wt 168# today   Gen: aaox3, cooperative, pleasant, not diaphoretic, nad  HEENT: ncat, neck supple, normal op w/o ulcer/exudate, anicteric, mmm  Resp/CV without acute findings, not dyspneic/tachycardic  Abd: Nondistended  Ext: no c/c/e  Skin: warm, perfused, no jaundice  Neuro: grossly intact    PERTINENT STUDIES:  10/19/18 labs show:  LFT WNL, alb 3.4  wbc 5.1, hgb 11.5, plt 294, mcv 94  ESR/CRP 13/2.9  Iron studies, B12, vit D WNL in 2016  3/14/18 fecal calprotectin was 2449  6TG 138  6MMP <500    RTC in 3 months, sooner if symptomatic.      NESHA JordanC  Division of Gastroenterology, Hepatology and Nutrition  [facility]       Answers for HPI/ROS submitted by the patient on 10/16/2018   General Symptoms: No  Skin Symptoms:  No  HENT Symptoms: Yes  EYE SYMPTOMS: No  HEART SYMPTOMS: No  LUNG SYMPTOMS: Yes  INTESTINAL SYMPTOMS: Yes  URINARY SYMPTOMS: No  GYNECOLOGIC SYMPTOMS: No  BREAST SYMPTOMS: No  SKELETAL SYMPTOMS: No  BLOOD SYMPTOMS: No  NERVOUS SYSTEM SYMPTOMS: No  MENTAL HEALTH SYMPTOMS: No  Ear pain: No  Ear discharge: No  Hearing loss: No  Tinnitus: No  Nosebleeds: No  Congestion: Yes  Sinus pain: No  Trouble swallowing: No   Voice hoarseness: Yes  Mouth sores: No  Sore throat: Yes  Tooth pain: No  Gum tenderness: No  Bleeding gums: No  Change in taste: No  Change in sense of smell: No  Dry mouth: No  Hearing aid used: No  Neck lump: No  Cough: Yes  Sputum or phlegm: Yes  Coughing up blood: No  Difficulty breating or shortness of breath: No  Snoring: No  Wheezing: No  Difficulty breathing on exertion: No  Nighttime Cough: Yes  Difficulty breathing when lying flat: No  Heart burn or indigestion: No  Nausea: No  Vomiting: No  Abdominal pain: No  Bloating: Yes  Constipation: No  Diarrhea: Yes  Blood in stool: No  Black stools: No  Rectal or Anal pain: No  Fecal incontinence: No  Yellowing of skin or eyes: No  Vomit with blood: No  Change in stools: No

## 2018-10-26 ENCOUNTER — ALLIED HEALTH/NURSE VISIT (OUTPATIENT)
Dept: NURSING | Facility: CLINIC | Age: 28
End: 2018-10-26
Payer: COMMERCIAL

## 2018-10-26 DIAGNOSIS — K50.10 CROHN'S DISEASE OF LARGE INTESTINE WITHOUT COMPLICATION (H): ICD-10-CM

## 2018-10-26 DIAGNOSIS — Z23 NEED FOR PROPHYLACTIC VACCINATION AND INOCULATION AGAINST INFLUENZA: Primary | ICD-10-CM

## 2018-10-26 LAB
C COLI+JEJUNI+LARI FUSA STL QL NAA+PROBE: NOT DETECTED
C DIFF TOX B STL QL: POSITIVE
EC STX1 GENE STL QL NAA+PROBE: NOT DETECTED
EC STX2 GENE STL QL NAA+PROBE: NOT DETECTED
ENTERIC PATHOGEN COMMENT: NORMAL
NOROV GI+II ORF1-ORF2 JNC STL QL NAA+PR: NOT DETECTED
RVA NSP5 STL QL NAA+PROBE: NOT DETECTED
SALMONELLA SP RPOD STL QL NAA+PROBE: NOT DETECTED
SHIGELLA SP+EIEC IPAH STL QL NAA+PROBE: NOT DETECTED
SPECIMEN SOURCE: ABNORMAL
V CHOL+PARA RFBL+TRKH+TNAA STL QL NAA+PR: NOT DETECTED
Y ENTERO RECN STL QL NAA+PROBE: NOT DETECTED

## 2018-10-26 PROCEDURE — 87329 GIARDIA AG IA: CPT | Mod: 59 | Performed by: PHYSICIAN ASSISTANT

## 2018-10-26 PROCEDURE — 90686 IIV4 VACC NO PRSV 0.5 ML IM: CPT

## 2018-10-26 PROCEDURE — 87506 IADNA-DNA/RNA PROBE TQ 6-11: CPT | Performed by: PHYSICIAN ASSISTANT

## 2018-10-26 PROCEDURE — 90471 IMMUNIZATION ADMIN: CPT

## 2018-10-26 PROCEDURE — 87177 OVA AND PARASITES SMEARS: CPT | Performed by: PHYSICIAN ASSISTANT

## 2018-10-26 PROCEDURE — 87209 SMEAR COMPLEX STAIN: CPT | Performed by: PHYSICIAN ASSISTANT

## 2018-10-26 PROCEDURE — 83993 ASSAY FOR CALPROTECTIN FECAL: CPT | Performed by: PHYSICIAN ASSISTANT

## 2018-10-26 PROCEDURE — 87493 C DIFF AMPLIFIED PROBE: CPT | Mod: 59 | Performed by: PHYSICIAN ASSISTANT

## 2018-10-26 NOTE — PROGRESS NOTES

## 2018-10-26 NOTE — MR AVS SNAPSHOT
After Visit Summary   10/26/2018    Natalie Villa    MRN: [id number]           Patient Information     Date Of Birth          1990        Visit Information        Provider Department      10/26/2018 8:30 AM SV FLU CLINIC [facility]        Today's Diagnoses     Need for prophylactic vaccination and inoculation against influenza    -  1       Follow-ups after your visit        Your next 10 appointments already scheduled     Nov 13, 2018  8:00 AM CST   Level 2 with RH INFUSION CHAIR 1   [facility] ([facility])    [facility]  [address]  [facility] [address]   [phone]            Dec 14, 2018  8:00 AM CST   Level 2 with RH INFUSION CHAIR 12   [facility] ([facility])    [facility]  [address]  [facility] [address]   [phone]            Jan 14, 2019  7:40 AM CST   (Arrive by 7:25 AM)   RETURN INFLAMMATORY BOWEL DISEASE with Guero Arias PA-C   [facility] ([facility] [place])    [address] [place] [address] [phone]              Who to contact     If you have questions or need follow up information about today's clinic visit or your schedule please contact [facility] directly at [phone].  Normal or non-critical lab and imaging results will be communicated to you by MyChart, letter or phone within 4 business days after the clinic has received the results. If you do not hear from us within 7 days, please contact the clinic through MyChart or phone. If you have a critical or abnormal lab result, we will notify you by phone as soon as possible.  Submit refill requests through [employer] or call your pharmacy and they will forward the refill request to us. Please allow 3 business days for your refill to be  completed.          Additional Information About Your Visit        Acamicahart Information     [employer] gives you secure access to your electronic health record. If you see a primary care provider, you can also send messages to your care team and make appointments. If you have questions, please call your primary care clinic.  If you do not have a primary care provider, please call [phone] and they will assist you.        Care EveryWhere ID     This is your Care EveryWhere ID. This could be used by other organizations to access your [place] medical records  [id number]        Your Vitals Were     Last Period                   10/05/2018            Blood Pressure from Last 3 Encounters:   10/24/18 117/72   10/19/18 118/70   09/25/18 120/72    Weight from Last 3 Encounters:   10/24/18 176 lb 9.6 oz (80.1 kg)   10/19/18 175 lb (79.4 kg)   09/25/18 173 lb 14.4 oz (78.9 kg)              We Performed the Following     FLU VACCINE, SPLIT VIRUS, IM (QUADRIVALENT) [06740]- >3 YRS     Vaccine Administration, Initial [44490]        Primary Care Provider Fax #    Physician No Ref-Primary [phone]       No address on file        Equal Access to Services     MIKE CADET : Hadii mirela rizvi hadhaleigho Soafshanali, waaxda luqadaha, qaybta kaalmada adeegyada, anu farris . So [facility] [phone].    ATENCIÓN: Si habla español, tiene a pham disposición servicios gratuitos de asistencia lingüística. [place] al [phone].    We comply with applicable federal civil rights laws and [place] laws. We do not discriminate on the basis of race, color, national origin, age, disability, sex, sexual orientation, or gender identity.            Thank you!     Thank you for choosing [facility]  for your care. Our goal is always to provide you with excellent care. Hearing back from our patients is one way we can continue to improve our services. Please take a few minutes to complete the written survey that you may  receive in the mail after your visit with us. Thank you!             Your Updated Medication List - Protect others around you: Learn how to safely use, store and throw away your medicines at www.disposemymeds.org.          This list is accurate as of 10/26/18  8:35 AM.  Always use your most recent med list.                   Brand Name Dispense Instructions for use Diagnosis    ALBUTEROL IN      None Entered        ALBUTEROL SULFATE IN      None Entered        apixaban ANTICOAGULANT 5 MG tablet    ELIQUIS     Take 5 mg by mouth        azaTHIOprine 50 MG tablet    IMURAN    180 tablet    Take 1.5 tablets (75 mg) by mouth 2 times daily    Crohn's disease of large intestine without complication (H)       multivitamin, therapeutic Tabs tablet      Take 1 tablet by mouth daily        vedolizumab 60 MG/ML injection    ENTYVIO

## 2018-10-29 ENCOUNTER — TELEPHONE (OUTPATIENT)
Dept: GASTROENTEROLOGY | Facility: CLINIC | Age: 28
End: 2018-10-29

## 2018-10-29 DIAGNOSIS — A04.72 COLITIS DUE TO CLOSTRIDIUM DIFFICILE: Primary | ICD-10-CM

## 2018-10-29 LAB
G LAMBLIA AG STL QL IA: NORMAL
O+P STL MICRO: NORMAL
SPECIMEN SOURCE: NORMAL
SPECIMEN SOURCE: NORMAL

## 2018-10-29 RX ORDER — VANCOMYCIN HYDROCHLORIDE 125 MG/1
CAPSULE ORAL
Qty: 107 CAPSULE | Refills: 0 | Status: SHIPPED | OUTPATIENT
Start: 2018-10-29 | End: 2019-05-03

## 2018-10-29 NOTE — PROGRESS NOTES
Cdiff positive. First occurrence. Plan for Cdiff taper.    Will fax over rx (sig is too long for eRx)      Guero Arias PA-C  Division of Gastroenterology, Hepatology and Nutrition  [facility]

## 2018-10-29 NOTE — TELEPHONE ENCOUNTER
Called patient and left message informing her of her c. Diff diagnosis and that her prescription was sent to the [facility]'s in [facility]. Patient was given medication instruction and was informed that if she had any questions or concerns she should call my direct number given to her.

## 2018-10-29 NOTE — TELEPHONE ENCOUNTER
----- Message from Guero Arias PA-C sent at 10/29/2018  7:06 AM CDT -----  Minda Healy--    Summer recently tested positive for Cdiff.  What would be your preference for treatment in her case given her active IBD (or possibly just driven by active infection I suppose)...    This is her first occurrence.  Taper vs 10 day course?    --Guero

## 2018-10-30 LAB — CALPROTECTIN STL-MCNT: 858.2 MG/KG (ref 0–49.9)

## 2018-10-31 ENCOUNTER — MYC MEDICAL ADVICE (OUTPATIENT)
Dept: GASTROENTEROLOGY | Facility: CLINIC | Age: 28
End: 2018-10-31

## 2018-11-13 ENCOUNTER — INFUSION THERAPY VISIT (OUTPATIENT)
Dept: INFUSION THERAPY | Facility: CLINIC | Age: 28
End: 2018-11-13
Attending: INTERNAL MEDICINE
Payer: COMMERCIAL

## 2018-11-13 VITALS
TEMPERATURE: 97 F | WEIGHT: 173 LBS | SYSTOLIC BLOOD PRESSURE: 112 MMHG | OXYGEN SATURATION: 98 % | HEART RATE: 70 BPM | BODY MASS INDEX: 28.79 KG/M2 | RESPIRATION RATE: 16 BRPM | DIASTOLIC BLOOD PRESSURE: 72 MMHG

## 2018-11-13 DIAGNOSIS — K50.10 CROHN'S DISEASE OF LARGE INTESTINE WITHOUT COMPLICATION (H): Primary | ICD-10-CM

## 2018-11-13 PROCEDURE — 25000128 H RX IP 250 OP 636: Performed by: INTERNAL MEDICINE

## 2018-11-13 PROCEDURE — 96413 CHEMO IV INFUSION 1 HR: CPT

## 2018-11-13 PROCEDURE — 96365 THER/PROPH/DIAG IV INF INIT: CPT

## 2018-11-13 RX ADMIN — VEDOLIZUMAB 300 MG: 300 INJECTION, POWDER, LYOPHILIZED, FOR SOLUTION INTRAVENOUS at 08:58

## 2018-11-13 NOTE — MR AVS SNAPSHOT
After Visit Summary   11/13/2018    Natalie Villa    MRN: [id number]           Patient Information     Date Of Birth          1990        Visit Information        Provider Department      11/13/2018 8:00 AM RH INFUSION CHAIR 1 [facility]        Today's Diagnoses     Crohn's disease of large intestine without complication (H)    -  1       Follow-ups after your visit        Your next 10 appointments already scheduled     Dec 14, 2018  8:00 AM CST   Level 2 with RH INFUSION CHAIR 12   [facility] ([facility])    [facility]  [address]  [facility] [address]   [phone]            Jan 14, 2019  7:40 AM CST   (Arrive by 7:25 AM)   RETURN INFLAMMATORY BOWEL DISEASE with Guero Arias PA-C   [facility] ([facility])    [address] [place] [id number] [phone]              Future tests that were ordered for you today     Open Standing Orders        Priority Remaining Interval Expires Ordered    Notify Physician Routine [id number] PRN  11/13/2018            Who to contact     If you have questions or need follow up information about today's clinic visit or your schedule please contact [facility] directly at [phone].  Normal or non-critical lab and imaging results will be communicated to you by MyChart, letter or phone within 4 business days after the clinic has received the results. If you do not hear from us within 7 days, please contact the clinic through MyChart or phone. If you have a critical or abnormal lab result, we will notify you by phone as soon as possible.  Submit refill requests through [employer] or call your pharmacy and they will forward the refill request to us. Please allow 3 business days for your refill to be completed.           Additional Information About Your Visit        MyChart Information     [employer] gives you secure access to your electronic health record. If you see a primary care provider, you can also send messages to your care team and make appointments. If you have questions, please call your primary care clinic.  If you do not have a primary care provider, please call [phone] and they will assist you.        Care EveryWhere ID     This is your Care EveryWhere ID. This could be used by other organizations to access your [place] medical records  [id number]        Your Vitals Were     Pulse Temperature Respirations Pulse Oximetry BMI (Body Mass Index)       70 97  F (36.1  C) (Oral) 16 98% 28.79 kg/m2        Blood Pressure from Last 3 Encounters:   11/13/18 112/72   10/24/18 117/72   10/19/18 118/70    Weight from Last 3 Encounters:   11/13/18 78.5 kg (173 lb)   10/24/18 80.1 kg (176 lb 9.6 oz)   10/19/18 79.4 kg (175 lb)              Today, you had the following     No orders found for display       Primary Care Provider Fax #    Physician No Ref-Primary [phone]       No address on file        Equal Access to Services     [facility] : Hadii mirela Rosas, waaxda lususi, qaybta kaalmada adeangelineyada, anu farris . So [facility] [phone].    ATENCIÓN: Si habla español, tiene a pham disposición servicios gratuitos de asistencia lingüística. Llame al [phone].    We comply with applicable federal civil rights laws and [place] laws. We do not discriminate on the basis of race, color, national origin, age, disability, sex, sexual orientation, or gender identity.            Thank you!     Thank you for choosing [facility]  for your care. Our goal is always to provide you with excellent care. Hearing back from our patients is one way we can continue to improve our services. Please take a few minutes to complete the written survey that you may  receive in the mail after your visit with us. Thank you!             Your Updated Medication List - Protect others around you: Learn how to safely use, store and throw away your medicines at www.disposemymeds.org.          This list is accurate as of 11/13/18  9:40 AM.  Always use your most recent med list.                   Brand Name Dispense Instructions for use Diagnosis    ALBUTEROL IN      None Entered        apixaban ANTICOAGULANT 5 MG tablet    ELIQUIS     Take 5 mg by mouth        azaTHIOprine 50 MG tablet    IMURAN    180 tablet    Take 1.5 tablets (75 mg) by mouth 2 times daily    Crohn's disease of large intestine without complication (H)       multivitamin, therapeutic Tabs tablet      Take 1 tablet by mouth daily        vancomycin 125 MG capsule    VANCOCIN    107 capsule    Take 125 mg (1 tab) 4 times daily for 14 days; Then take 125 mg (1 tab) 3 times daily for 7 days; Then take 125 mg (1 tab) 2 times daily for 7 days; Then take 125 mg (1 tab) once daily for 7 days; Then take 125 mg (1 tab) once every 2 days for 8 days = 4 doses over 8 days; Then take 125 mg (1 tab) Once every 3 days for 15 days = 5 doses over 15 days    Colitis due to Clostridium difficile       vedolizumab 60 MG/ML injection    ENTYVIO

## 2018-11-13 NOTE — PROGRESS NOTES
Infusion Nursing Note:  Natalie Villa presents today for entyvio.    Patient seen by provider today: No   present during visit today: Not Applicable.    Note: Patient has been on vanco for two weeks for c-diff infection.  Notified Dr. Toth.  [place]:  Ok to give entyvio today as long as afebrile and diarrhea has improved.  Per patient report, diarrhea has resolved.    Intravenous Access:  Peripheral IV placed.    Treatment Conditions:  Biological Infusion Checklist:    ~~~ NOTE: If the patient answers yes to any of the questions below, hold the infusion and contact ordering provider or on-call provider.    1. Have you recently had an elevated temperature, fever, chills, productive cough, coughing for 3 weeks or longer or hemoptysis,  abnormal vital signs, night sweats,  chest pain or have you noticed a decrease in your appetite, unexplained weight loss or fatigue? No  2. Do you have any open wounds or new incisions? No  3. Do you have any recent or upcoming hospitalizations, surgeries or dental procedures? No  4. Do you currently have or recently have had any signs of illness or infection or are you on any antibiotics? Yes, taking vanco for cdiff  5. Have you had any new, sudden or worsening abdominal pain? No  6. Have you or anyone in your household received a live vaccination in the past 4 weeks? Please note:  No live vaccines while on biologic/chemotherapy until 6 months after the last treatment.  Patient can receive the flu vaccine (shot only) and the pneumovax.  It is optimal for the patient to get these vaccines mid cycle, but they can be given at any time as long as it is not on the day of the infusion. No  7. Have you recently been diagnosed with any new nervous system diseases (ie. Multiple sclerosis, Guillain [place], seizures, neurological changes) or cancer diagnosis? Are you on any form of radiation or chemotherapy? No  8. Are you pregnant or breast feeding or do you have plans of pregnancy in  the future? No  9. Have you been having any signs of worsening depression or suicidal ideations?  (benlysta only) No  10. Have there been any other new onset medical symptoms? No        Post Infusion Assessment:  Patient tolerated infusion without incident.  Blood return noted pre and post infusion.  Site patent and intact, free from redness, edema or discomfort.  No evidence of extravasations.  Access discontinued per protocol.  Biologic Infusion Post Education: Call the triage nurse at your clinic or seek medical attention if you have chills and/or temperature greater than or equal to 100.5, uncontrolled nausea/vomiting, diarrhea, constipation, dizziness, shortness of breath, chest pain, heart palpitations, weakness or any other new or concerning symptoms, questions or concerns.  You cannot have any live virus vaccines prior to or during treatment or up to 6 months post infusion.  If you have an upcoming surgery, medical procedure or dental procedure during treatment, this should be discussed with your ordering physician and your surgeon/dentist.  If you are having any concerning symptom, if you are unsure if you should get your next infusion or wish to speak to a provider before your next infusion, please call your care coordinator or triage nurse at your clinic to notify them so we can adequately serve you.    Discharge Plan:   AVS to patient via [employer].  Patient will return 12/14/18 for next appointment.   Patient discharged in stable condition accompanied by: self.  Departure Mode: Ambulatory.    Kary Saenz RN

## 2018-12-11 DIAGNOSIS — A04.72 COLITIS DUE TO CLOSTRIDIUM DIFFICILE: ICD-10-CM

## 2018-12-13 NOTE — TELEPHONE ENCOUNTER
vancomycin (VANCOCIN) 125 MG capsule      Last Written Prescription Date:  10/29/18  Last Fill Quantity: 107,   # refills: 0  Last Office Visit : 10/24/18  Future Office visit:  1/14/19    Routing refill request to provider for review/approval because:  Drug not on the GI refill protocol.

## 2018-12-14 ENCOUNTER — HOSPITAL ENCOUNTER (OUTPATIENT)
Facility: CLINIC | Age: 28
Setting detail: SPECIMEN
Discharge: HOME | End: 2018-12-14
Attending: INTERNAL MEDICINE | Admitting: INTERNAL MEDICINE
Payer: COMMERCIAL

## 2018-12-14 ENCOUNTER — INFUSION THERAPY VISIT (OUTPATIENT)
Dept: INFUSION THERAPY | Facility: CLINIC | Age: 28
End: 2018-12-14
Attending: INTERNAL MEDICINE
Payer: COMMERCIAL

## 2018-12-14 VITALS
OXYGEN SATURATION: 99 % | HEART RATE: 58 BPM | DIASTOLIC BLOOD PRESSURE: 61 MMHG | RESPIRATION RATE: 16 BRPM | BODY MASS INDEX: 29.09 KG/M2 | WEIGHT: 174.8 LBS | TEMPERATURE: 97.2 F | SYSTOLIC BLOOD PRESSURE: 105 MMHG

## 2018-12-14 DIAGNOSIS — K50.10 CROHN'S DISEASE OF LARGE INTESTINE WITHOUT COMPLICATION (H): Primary | ICD-10-CM

## 2018-12-14 DIAGNOSIS — A04.72 CLOSTRIDIUM DIFFICILE DIARRHEA: Primary | ICD-10-CM

## 2018-12-14 LAB
ALBUMIN SERPL-MCNC: 3.5 G/DL (ref 3.4–5)
ALP SERPL-CCNC: 49 U/L (ref 40–150)
ALT SERPL W P-5'-P-CCNC: 11 U/L (ref 0–50)
AST SERPL W P-5'-P-CCNC: 12 U/L (ref 0–45)
BASOPHILS # BLD AUTO: 0 10E9/L (ref 0–0.2)
BASOPHILS NFR BLD AUTO: 1.1 %
BILIRUB DIRECT SERPL-MCNC: 0.2 MG/DL (ref 0–0.2)
BILIRUB SERPL-MCNC: 0.9 MG/DL (ref 0.2–1.3)
CRP SERPL-MCNC: <2.9 MG/L (ref 0–8)
DIFFERENTIAL METHOD BLD: ABNORMAL
EOSINOPHIL # BLD AUTO: 0.3 10E9/L (ref 0–0.7)
EOSINOPHIL NFR BLD AUTO: 8.6 %
ERYTHROCYTE [DISTWIDTH] IN BLOOD BY AUTOMATED COUNT: 13.2 % (ref 10–15)
ERYTHROCYTE [SEDIMENTATION RATE] IN BLOOD BY WESTERGREN METHOD: 8 MM/H (ref 0–20)
HCT VFR BLD AUTO: 36.5 % (ref 35–47)
HGB BLD-MCNC: 11.7 G/DL (ref 11.7–15.7)
IMM GRANULOCYTES # BLD: 0 10E9/L (ref 0–0.4)
IMM GRANULOCYTES NFR BLD: 0.3 %
LYMPHOCYTES # BLD AUTO: 1.1 10E9/L (ref 0.8–5.3)
LYMPHOCYTES NFR BLD AUTO: 30.8 %
MCH RBC QN AUTO: 30.7 PG (ref 26.5–33)
MCHC RBC AUTO-ENTMCNC: 32.1 G/DL (ref 31.5–36.5)
MCV RBC AUTO: 96 FL (ref 78–100)
MONOCYTES # BLD AUTO: 0.3 10E9/L (ref 0–1.3)
MONOCYTES NFR BLD AUTO: 8.6 %
NEUTROPHILS # BLD AUTO: 1.9 10E9/L (ref 1.6–8.3)
NEUTROPHILS NFR BLD AUTO: 50.6 %
NRBC # BLD AUTO: 0 10*3/UL
NRBC BLD AUTO-RTO: 0 /100
PLATELET # BLD AUTO: 213 10E9/L (ref 150–450)
PROT SERPL-MCNC: 7.8 G/DL (ref 6.8–8.8)
RBC # BLD AUTO: 3.81 10E12/L (ref 3.8–5.2)
WBC # BLD AUTO: 3.7 10E9/L (ref 4–11)

## 2018-12-14 PROCEDURE — 86140 C-REACTIVE PROTEIN: CPT | Performed by: INTERNAL MEDICINE

## 2018-12-14 PROCEDURE — 85025 COMPLETE CBC W/AUTO DIFF WBC: CPT | Performed by: INTERNAL MEDICINE

## 2018-12-14 PROCEDURE — 80076 HEPATIC FUNCTION PANEL: CPT | Performed by: INTERNAL MEDICINE

## 2018-12-14 PROCEDURE — 25000128 H RX IP 250 OP 636: Performed by: INTERNAL MEDICINE

## 2018-12-14 PROCEDURE — 85652 RBC SED RATE AUTOMATED: CPT | Performed by: INTERNAL MEDICINE

## 2018-12-14 PROCEDURE — 96365 THER/PROPH/DIAG IV INF INIT: CPT

## 2018-12-14 RX ORDER — VANCOMYCIN HYDROCHLORIDE 125 MG/1
CAPSULE ORAL
Qty: 30 CAPSULE | Refills: 0 | Status: SHIPPED | OUTPATIENT
Start: 2018-12-14 | End: 2019-05-03

## 2018-12-14 RX ORDER — CALCIUM CARBONATE 500(1250)
1 TABLET ORAL DAILY
COMMUNITY

## 2018-12-14 RX ORDER — VANCOMYCIN HYDROCHLORIDE 125 MG/1
CAPSULE ORAL
Qty: 107 CAPSULE | Refills: 0 | OUTPATIENT
Start: 2018-12-14

## 2018-12-14 RX ORDER — FAMOTIDINE 20 MG
2 TABLET ORAL DAILY
COMMUNITY

## 2018-12-14 RX ADMIN — VEDOLIZUMAB 300 MG: 300 INJECTION, POWDER, LYOPHILIZED, FOR SOLUTION INTRAVENOUS at 08:46

## 2018-12-14 ASSESSMENT — PAIN SCALES - GENERAL: PAINLEVEL: NO PAIN (0)

## 2018-12-14 NOTE — PROGRESS NOTES
Infusion Nursing Note:  Natalie Villa presents today for Entyvio.    Patient seen by provider today: No   present during visit today: Not Applicable.    Note: Currently on the last step of vancomycin for Cdiff. Dr Toth aware (per last visit note), so will proceed today. Diarrhea has resolved, per patient- better than her normal. Afebrile; no abd cramping.    Intravenous Access:  Peripheral IV placed.    Treatment Conditions:  Biological Infusion Checklist:    ~~~ NOTE: If the patient answers yes to any of the questions below, hold the infusion and contact ordering provider or on-call provider.    1. Have you recently had an elevated temperature, fever, chills, productive cough, coughing for 3 weeks or longer or hemoptysis,  abnormal vital signs, night sweats,  chest pain or have you noticed a decrease in your appetite, unexplained weight loss or fatigue? No  2. Do you have any open wounds or new incisions? No  3. Do you have any recent or upcoming hospitalizations, surgeries or dental procedures? No  4. Do you currently have or recently have had any signs of illness or infection or are you on any antibiotics? Yes- Currently on the final step of vancomycin. See note above.  5. Have you had any new, sudden or worsening abdominal pain? No  6. Have you or anyone in your household received a live vaccination in the past 4 weeks? Please note:  No live vaccines while on biologic/chemotherapy until 6 months after the last treatment.  Patient can receive the flu vaccine (shot only) and the pneumovax.  It is optimal for the patient to get these vaccines mid cycle, but they can be given at any time as long as it is not on the day of the infusion. No  7. Have you recently been diagnosed with any new nervous system diseases (ie. Multiple sclerosis, Guillain [place], seizures, neurological changes) or cancer diagnosis? Are you on any form of radiation or chemotherapy? No  8. Are you pregnant or breast feeding or do  you have plans of pregnancy in the future? No  9. Have you been having any signs of worsening depression or suicidal ideations?  (benlysta only) No  10. Have there been any other new onset medical symptoms? No    Post Infusion Assessment:  Patient tolerated infusion without incident.  Blood return noted pre and post infusion.  Site patent and intact, free from redness, edema or discomfort.  No evidence of extravasations.  Access discontinued per protocol.    Discharge Plan:   Discharge instructions reviewed with: Patient.  Patient and/or family verbalized understanding of discharge instructions and all questions answered.  AVS to patient via [employer].  Patient will return in approx 4 weeks for next appointment- patient prefers to call to schedule.   Patient discharged in stable condition accompanied by: self.  Departure Mode: Ambulatory.    Maira Luisa Toure RN

## 2018-12-14 NOTE — PATIENT INSTRUCTIONS
EDUCATION POST BIOLOGICAL/CHEMOTHERAPY INFUSION  Call the triage nurse at your clinic or seek medical attention if you have chills and/or temperature greater than or equal to 100.5, uncontrolled nausea/vomiting, diarrhea, constipation, dizziness, shortness of breath, chest pain, heart palpitations, weakness or any other new or concerning symptoms, questions or concerns.  You can not have any live virus vaccines prior to or during treatment or up to 6 months post infusion.  If you have an upcoming surgery, medical procedure or dental procedure during treatment, this should be discussed with your ordering physician and your surgeon/dentist.  If you are having any concerning symptom, if you are unsure if you should get your next infusion or wish to speak to a provider before your next infusion, please call your care coordinator or triage nurse at your clinic to notify them so we can adequately serve you.

## 2018-12-17 ENCOUNTER — OFFICE VISIT (OUTPATIENT)
Dept: DERMATOLOGY | Facility: CLINIC | Age: 28
End: 2018-12-17
Payer: COMMERCIAL

## 2018-12-17 VITALS — HEART RATE: 67 BPM | SYSTOLIC BLOOD PRESSURE: 108 MMHG | DIASTOLIC BLOOD PRESSURE: 68 MMHG

## 2018-12-17 DIAGNOSIS — L85.3 XEROSIS OF SKIN: Primary | ICD-10-CM

## 2018-12-17 DIAGNOSIS — S90.229A SUBUNGUAL HEMATOMA OF TOE, UNSPECIFIED LATERALITY, INITIAL ENCOUNTER: ICD-10-CM

## 2018-12-17 DIAGNOSIS — Z79.899 ENCOUNTER FOR LONG-TERM (CURRENT) USE OF HIGH-RISK MEDICATION: ICD-10-CM

## 2018-12-17 ASSESSMENT — PAIN SCALES - GENERAL: PAINLEVEL: NO PAIN (0)

## 2018-12-17 NOTE — LETTER
"12/17/2018       RE: Natalie Villa  [address]  [address]  [facility] [address]     Dear Colleague,    Thank you for referring your patient, Natalie Villa, to the [facility] DERMATOLOGY at [facility]. Please see a copy of my visit note below.    [facility] Dermatology Note      Dermatology Problem List:  1. Crohn's disease on azathioprine and vedolizumab    Encounter Date: Dec 17, 2018    CC:   Chief Complaint   Patient presents with     Skin Check     Natalie is here for a skin check and notes \"a build up of excess skin on the left thumb\"       History of Present Illness:  Natalie Villa is a 28 year old woman w/ a PMHx of Crohn's disease on azathioprine and vedolizumab who presents for skin check from her GI provider. Se has no active concerning lesions at the moment but notes a L thumb lesion that at times gets larger/more keratinized when her Crohn's is active. Her PCP thought this may be a wart and was treated as such in the past. It presently is not elevated and appears as a faint scar. She endorses drier skin since moving back to [place] from [place].     Had herpes zoster earlier this year involving L lower abdomen into L inner thigh. Lesions have resolved without any residual pains or scarring.     She does not report a hx of lower extremity or nodular lesions in the past. She did have a R scalp nodule in May 2015 when she switched from infliximab to vedolizumab. The lesion ruptured, spilling bloody purulent material. It then became ulcerated and she lost hair around the lesion. It healed in 2 months. She had not had any similar lesions since. Occasionally has small bumps on scalp but none active at this time. Seems to get a sore throat every November and notes she generally gets an oral ulcer at that time. Did not happen this year.     Past Medical History:   Patient Active Problem List   Diagnosis     Crohn's disease of large intestine without " complication (H)     Herpes zoster without complication     No past medical history on file.  No past surgical history on file.    Social History:   reports that  has never smoked. she has never used smokeless tobacco. She reports that she drinks alcohol. She reports that she does not use drugs.    Family History:  - psoriasis in mom and sister  - no family hx of skin cancer     Medications:  Current Outpatient Medications   Medication Sig Dispense Refill     ALBUTEROL IN None Entered       apixaban ANTICOAGULANT (ELIQUIS) 5 MG tablet Take 5 mg by mouth       azaTHIOprine (IMURAN) 50 MG tablet Take 1.5 tablets (75 mg) by mouth 2 times daily 180 tablet 1     calcium carbonate (OS-ELIEL) 500 MG tablet Take 2 tablets by mouth daily       multivitamin, therapeutic (THERA-VIT) TABS tablet Take 1 tablet by mouth daily       vancomycin (VANCOCIN) 125 MG capsule Patient to complete taper. 1 every 3 days 30 capsule 0     vancomycin (VANCOCIN) 125 MG capsule Take 125 mg (1 tab) 4 times daily for 14 days; Then take 125 mg (1 tab) 3 times daily for 7 days; Then take 125 mg (1 tab) 2 times daily for 7 days; Then take 125 mg (1 tab) once daily for 7 days; Then take 125 mg (1 tab) once every 2 days for 8 days = 4 doses over 8 days; Then take 125 mg (1 tab) Once every 3 days for 15 days = 5 doses over 15 days 107 capsule 0     vedolizumab (ENTYVIO) 60 MG/ML injection        Vitamin D, Cholecalciferol, 1000 units CAPS Take 1 tablet by mouth daily          Allergies   Allergen Reactions     Sulfa Drugs Nausea     Gi upset       Review of Systems:  -As per HPI  -Constitutional: The patient denies fatigue, fevers, chills, unintended weight loss, and night sweats.  -HEENT: Patient denies nonhealing oral sores.  -Skin: As above in HPI. No additional skin concerns.    Physical exam:  Vitals: /68 (BP Location: Left arm, Patient Position: Chair)   Pulse 67   GEN: This is a well developed, well-nourished female in no acute distress, in  a pleasant mood.    SKIN: Albert III. Total skin excluding the undergarment areas was performed. The exam included the head/face, scalp, neck, both arms, chest, back, abdomen, both legs, digits and/or nails, oropharynx  - L thumb without apparent lesion  - subungual purple/black macule on the toenail  - no other notable skin lesions or rashes over face, neck, chest, back, abdomen, legs, hands, or feet    Impression/Plan:  1. Full-body skin exam w/ Hx of Crohn's disease: on azathioprine and vedolizumab. Occasional thumb lesion that gets larger/more keratinized when disease is active; does not appear active today. Reports hx of scalp lesion that may be consistent w/ folliculitis, has not recurred since the initial switch to vedolizumab.     Monthly self-skin checks    Discussed sun protective behaviors including sunscreen use and sun avoidance strategies.    If scalp lesion or similar lesions recur, pt to come back to derm clinic    2. Xerosis    Use moisturizing lotion to whole body after showering    Recommend application of OTC Vaseline, Cerave, Cetaphil, or Vanicream to areas of dryness, as needed    3. Subungal hematoma: From playing ultimate frisbee. Moving out w/ nail. Continue to monitor       Follow-up 12 months       Dr. Wright staffed the patient.    Staff Involved:  Resident(Sohail Tellez)/Staff(as above)    Staff Physician Comments:   I saw and evaluated the patient with the resident and I edited the assessment and plan as documented in the note. I was present for the examination.    Kwabena Wright MD   of Dermatology  Department of Dermatology  [facility] [employer]              Again, thank you for allowing me to participate in the care of your patient.      Sincerely,    Kwabena Wright MD

## 2018-12-17 NOTE — PROGRESS NOTES
"[facility] Dermatology Note      Dermatology Problem List:  1. Crohn's disease on azathioprine and vedolizumab    Encounter Date: Dec 17, 2018    CC:   Chief Complaint   Patient presents with     Skin Check     Summer is here for a skin check and notes \"a build up of excess skin on the left thumb\"       History of Present Illness:  Natalie Villa is a 28 year old woman w/ a PMHx of Crohn's disease on azathioprine and vedolizumab who presents for skin check from her GI provider. Se has no active concerning lesions at the moment but notes a L thumb lesion that at times gets larger/more keratinized when her Crohn's is active. Her PCP thought this may be a wart and was treated as such in the past. It presently is not elevated and appears as a faint scar. She endorses drier skin since moving back to [place] from [place].     Had herpes zoster earlier this year involving L lower abdomen into L inner thigh. Lesions have resolved without any residual pains or scarring.     She does not report a hx of lower extremity or nodular lesions in the past. She did have a R scalp nodule in May 2015 when she switched from infliximab to vedolizumab. The lesion ruptured, spilling bloody purulent material. It then became ulcerated and she lost hair around the lesion. It healed in 2 months. She had not had any similar lesions since. Occasionally has small bumps on scalp but none active at this time. Seems to get a sore throat every November and notes she generally gets an oral ulcer at that time. Did not happen this year.     Past Medical History:   Patient Active Problem List   Diagnosis     Crohn's disease of large intestine without complication (H)     Herpes zoster without complication     No past medical history on file.  No past surgical history on file.    Social History:   reports that  has never smoked. she has never used smokeless tobacco. She reports that she drinks alcohol. She reports that she does not use " drugs.    Family History:  - psoriasis in mom and sister  - no family hx of skin cancer     Medications:  Current Outpatient Medications   Medication Sig Dispense Refill     ALBUTEROL IN None Entered       apixaban ANTICOAGULANT (ELIQUIS) 5 MG tablet Take 5 mg by mouth       azaTHIOprine (IMURAN) 50 MG tablet Take 1.5 tablets (75 mg) by mouth 2 times daily 180 tablet 1     calcium carbonate (OS-ELIEL) 500 MG tablet Take 2 tablets by mouth daily       multivitamin, therapeutic (THERA-VIT) TABS tablet Take 1 tablet by mouth daily       vancomycin (VANCOCIN) 125 MG capsule Patient to complete taper. 1 every 3 days 30 capsule 0     vancomycin (VANCOCIN) 125 MG capsule Take 125 mg (1 tab) 4 times daily for 14 days; Then take 125 mg (1 tab) 3 times daily for 7 days; Then take 125 mg (1 tab) 2 times daily for 7 days; Then take 125 mg (1 tab) once daily for 7 days; Then take 125 mg (1 tab) once every 2 days for 8 days = 4 doses over 8 days; Then take 125 mg (1 tab) Once every 3 days for 15 days = 5 doses over 15 days 107 capsule 0     vedolizumab (ENTYVIO) 60 MG/ML injection        Vitamin D, Cholecalciferol, 1000 units CAPS Take 1 tablet by mouth daily          Allergies   Allergen Reactions     Sulfa Drugs Nausea     Gi upset       Review of Systems:  -As per HPI  -Constitutional: The patient denies fatigue, fevers, chills, unintended weight loss, and night sweats.  -HEENT: Patient denies nonhealing oral sores.  -Skin: As above in HPI. No additional skin concerns.    Physical exam:  Vitals: /68 (BP Location: Left arm, Patient Position: Chair)   Pulse 67   GEN: This is a well developed, well-nourished female in no acute distress, in a pleasant mood.    SKIN: Albert III. Total skin excluding the undergarment areas was performed. The exam included the head/face, scalp, neck, both arms, chest, back, abdomen, both legs, digits and/or nails, oropharynx  - L thumb without apparent lesion  - subungual purple/black macule on  the toenail  - no other notable skin lesions or rashes over face, neck, chest, back, abdomen, legs, hands, or feet    Impression/Plan:  1. Full-body skin exam w/ Hx of Crohn's disease: on azathioprine and vedolizumab. Occasional thumb lesion that gets larger/more keratinized when disease is active; does not appear active today. Reports hx of scalp lesion that may be consistent w/ folliculitis, has not recurred since the initial switch to vedolizumab.     Monthly self-skin checks    Discussed sun protective behaviors including sunscreen use and sun avoidance strategies.    If scalp lesion or similar lesions recur, pt to come back to derm clinic    2. Xerosis    Use moisturizing lotion to whole body after showering    Recommend application of OTC Vaseline, Cerave, Cetaphil, or Vanicream to areas of dryness, as needed    3. Subungal hematoma: From playing ultimate frisbee. Moving out w/ nail. Continue to monitor       Follow-up 12 months       Dr. Wright staffed the patient.    Staff Involved:  Resident(Sohail Tellez)/Staff(as above)    Staff Physician Comments:   I saw and evaluated the patient with the resident and I edited the assessment and plan as documented in the note. I was present for the examination.    Kwabena Wright MD   of Dermatology  Department of Dermatology  [facility]

## 2019-01-07 ENCOUNTER — TELEPHONE (OUTPATIENT)
Dept: GASTROENTEROLOGY | Facility: CLINIC | Age: 29
End: 2019-01-07

## 2019-01-11 ENCOUNTER — INFUSION THERAPY VISIT (OUTPATIENT)
Dept: INFUSION THERAPY | Facility: CLINIC | Age: 29
End: 2019-01-11
Attending: INTERNAL MEDICINE
Payer: COMMERCIAL

## 2019-01-11 VITALS
HEART RATE: 69 BPM | TEMPERATURE: 98.2 F | DIASTOLIC BLOOD PRESSURE: 64 MMHG | RESPIRATION RATE: 16 BRPM | OXYGEN SATURATION: 98 % | SYSTOLIC BLOOD PRESSURE: 102 MMHG

## 2019-01-11 DIAGNOSIS — K50.10 CROHN'S DISEASE OF LARGE INTESTINE WITHOUT COMPLICATION (H): Primary | ICD-10-CM

## 2019-01-11 PROCEDURE — 96365 THER/PROPH/DIAG IV INF INIT: CPT

## 2019-01-11 PROCEDURE — 25000128 H RX IP 250 OP 636: Performed by: INTERNAL MEDICINE

## 2019-01-11 RX ADMIN — VEDOLIZUMAB 300 MG: 300 INJECTION, POWDER, LYOPHILIZED, FOR SOLUTION INTRAVENOUS at 13:42

## 2019-01-15 ENCOUNTER — OFFICE VISIT (OUTPATIENT)
Dept: GASTROENTEROLOGY | Facility: CLINIC | Age: 29
End: 2019-01-15
Payer: COMMERCIAL

## 2019-01-15 VITALS
OXYGEN SATURATION: 96 % | TEMPERATURE: 98.4 F | SYSTOLIC BLOOD PRESSURE: 107 MMHG | HEIGHT: 66 IN | BODY MASS INDEX: 28.04 KG/M2 | DIASTOLIC BLOOD PRESSURE: 67 MMHG | HEART RATE: 85 BPM | WEIGHT: 174.5 LBS

## 2019-01-15 DIAGNOSIS — K51.00 ULCERATIVE PANCOLITIS WITHOUT COMPLICATION (H): Primary | ICD-10-CM

## 2019-01-15 ASSESSMENT — ENCOUNTER SYMPTOMS
BRUISES/BLEEDS EASILY: 0
SWOLLEN GLANDS: 0

## 2019-01-15 ASSESSMENT — MIFFLIN-ST. JEOR: SCORE: 1530.34

## 2019-01-15 ASSESSMENT — PAIN SCALES - GENERAL: PAINLEVEL: NO PAIN (0)

## 2019-01-15 NOTE — LETTER
1/15/2019       RE: Natalie Villa  [address]  [address]  [facility] [address]     Dear Colleague,    Thank you for referring your patient, Natalie Villa, to the [facility] GASTROENTEROLOGY AND IBD CLINIC at [facility]. Please see a copy of my visit note below.    GI CLINIC VISIT - NEW PATIENT    CC/REFERRING PROVIDER: Referred Self  REASON FOR CONSULTATION: IBD follow up  COLLABORATING PHYSICIAN: Niraj Toth MD    HPI: 28 year old female with history of Crohn's vs UC on Vedolizumab (failed adalimumab, and questionable antibodies to infliximab, now on vedolizumab since 6/19/2015) + AZA, initially diagnosed with Crohn's in 2013. Her symptoms at the time were bloody diarrhea, left sided abdominal pain and general fatigue. She was treated with Humira for 1 year but stopped after colonoscopy showed ongoing disease. Then was treated with Remicade for about 18 months but per patient report, thinks she developed antibodies to it. She was started on Vedolizumab in June of 2015 initially at 300 mg Q8 weeks which was increased to Q6 weeks and mostly recently to Q4 weeks in May 2018 after colonoscopy in April 2018 showed ongoing colitis. She has been on AZA since diagnosis, initially at 200 mg Qday which has been titrated down to 100 mg Qday.     She had her first occurrence of C. difficile infection after the stool studies were completed after last visit in October 2018.  She completed a vancomycin taper just recently.  She reports that this is the best she is ever felt.  She currently has 3 formed stools per day, no blood in the stool.  Occasional mucus in the morning.  Fatigue is improved.  No urgency or nighttime stools.    She also continues to follow with hematology as she was diagnosed with a LLE DVT last fall that was thought to be provoked and completed therapy with Eliquis.  She reports that she will be receiving IV iron infusions in the near future for borderline  hemoglobin, which I note is normal most recently in October 2018.    No extraintestinal manifestations at this time.    PERTINENT PAST MEDICAL HISTORY:  As noted above.    PREVIOUS ABDOMINAL/GYNECOLOGIC SURGERIES:  None     PREVIOUS ENDOSCOPY:  Colonoscopy 4/23/2018 at [facility]: Mild colitis starting at the transverse colon (Borrego 1), progressing to more moderate colitis in the rectosigmoid (Borrego 2). Bx showed chronic colitis. TI and right colon were unremarkable endoscopically and bx showed mild chronic activity.     Flex sig 10/2/2018 shows Borrego 2/3, continuous, biopsies show moderate chronic active colitis.    PERTINENT MEDICATIONS:  - Azathioprine 100 mg Qday   - Vedolizumab 300 mg Q4W (last dose 9/21/18)  - Maria Victoria OCP   Medications reviewed with patient today, see Medication List/Assessment for details.  No other NSAID/anticoagulation reported by patient.  No other OTC/herbal/supplements reported by patient.    SOCIAL HISTORY:  - Works as   - Uses alcohol 1-2 times/month   - Smokes marijuana 2-3 times/week  - No tobacco   - Sexually active, uses condoms      FAMILY HISTORY:  No colon/panc/esophageal/other GI CA, no other Valverde or other HPS-related Keo. No IBD/celiac, no other AI/liver/thyroid disease. + for asthma and acne.     ASSESSMENT/PLAN:    1. Colonic Crohn's Disease with concern for indeterminate IBD/phenotypic UC, on her third biologic therapy (hx of Humira, Remicade) with a relative rapid dose escalation, recent DVT concern for ongoing inflammation (among other factors) and recent Cdiff infection, who presents for follow up:  --Reassuring that patient has achieved symptomatic remission.  We have yet to document endoscopic remission, and given patient had active C. difficile infection with last fecal calprotectin, we will repeat it.  If this continues to be elevated, we will repeat endoscopic assessment.  If ongoing inflammation is present we will need to determine next steps in therapy for  optimization.  --Last visit we increased her azathioprine from 50 mg twice daily to 75 mg twice daily.  I do note that patient became leukopenic that most recent blood draw 12/14/2018, which could have been due to the increased dose of azathioprine.  We will obtain thiopurine metabolites at her next blood draw.  Based on these metabolites and white blood cell count, can then determine how we can best optimize azathioprine.    --Endoscopic assessment if there is a persistent elevation in fecal calprotectin  -- If optimizing AZA is not effective, medical therapy would be limited to consider alternative biologic agents such as Stelara, given vedolizumab is already maximized.  Would likely not return to antiTNF given she did not have adequate response to adalimumab.  Surgical therapy should also be strongly considered.  - For now, continue Entyvio 300mg IV Q4 weeks (dose adjusted to Q4W in May 2018)  - Continue AZA 75 BID for now. Will add thiopurine levels to next draw.  - Labs with infusions to include LFT, CBC ESR/CRP    2. Cdiff infection: Difficult to determine how long patient has had an active infection in the setting of ongoing inflammation documented by most recent flexible sigmoidoscopy in October 2018.  Certainly active inflammatory bowel disease would be a patient at risk for infection such as C. difficile.  Patient completed a vancomycin taper with great symptomatic response.  There was no notable antibiotic trigger, concern for active IBD as trigger for active infection.  Will need to ensure endoscopic healing as stated above to help prevent frequent recurrent infections.    3. LLE DVT:   thought to be provoked (factors include prolonged car ride from [place] to [place] when moved here this summer, strain to gastrocnemius muscle on crutches for 6 weeks, on birth control and active IBD) and is currently on Eliquis.  Concern is of course for ongoing active IBD, leading to a hypercoagulable state.  We will  continue to work to optimize IBD regimen as stated above.  Patient has now completed anticoagulation therapy (3 months with Eliquis).  She discontinued oral contraceptives.     4. Continue to monitor for the danger signs/symptoms we reviewed together today:  worsening abdominal pain, worsening diarrhea, obstructive symptoms (nausea/vomiting, abdominal distention, inability to pass stool/flatus), blood mixed into stools, persistent fevers/chills, progressive anemia (particulary with iron deficiency), difficulty with swallowing, perianal/rectal discomfort (particularly with defecation), unexpected weight loss, etc.  - Contact us via MyChart or phone should these symptoms occur, particularly as we may advise further evaluation accordingly.    4. IBD health maintenance   Vaccinations:  -- Influenza (every year): Last given 2018  -- TdaP (every 10 years): Last given 2014  -- Pneumococcal Pneumonia (once then every 5 years): Last given 2015  -- Yearly assessment for latent Tb (verbal screening and exam, PPD or QuantiFERON-Tb testing): Indeterminate due to anergy 2017     One time confirmation of immunity or serologies:  -- Hepatitis A (serologies or immunizations): Unknown  -- Hepatitis B (serologies or immunizations): Vaccinated  -- Varicella: had chicken pox as a child  -- MMR: [place]   -- HPV (all aged 18-26): Up to date  -- Meningococcal meningitis (all patients at risk for meningitis): Unknown  -- Due to the immunosuppression in this patient, I would not advise administration of live vaccines such as varicella/VZV, intranasal influenza, MMR, or yellow fever vaccine (if travelling).      Bone mineral density screening   -- DEXA WNL 2015    Cancer Screening:  Colon cancer screening: due 2020  Cervical cancer screening: Annual due to immunosupression    PHYSICAL EXAMINATION:  Vitals reviewed, AFVSS  Wt 168# today   Gen: aaox3, cooperative, pleasant, not diaphoretic, nad  HEENT: ncat, neck supple, normal op w/o  ulcer/exudate, anicteric, mmm  Resp/CV without acute findings, not dyspneic/tachycardic  Abd: Nondistended  Ext: no c/c/e  Skin: warm, perfused, no jaundice  Neuro: grossly intact    PERTINENT STUDIES:  12/14/18 labs show:  LFT WNL, alb 3.5  wbc 3.7, hgb 11.7, plt 213, mcv 96  ESR/CRP 8/2.9  Iron studies, B12, vit D WNL in 2016  10/26/2018 fecal calprotectin 858.  3/14/18 fecal calprotectin was 2449  6TG 138  6MMP <500    RTC in 3 months, sooner if symptomatic.      NESHA JordanC  Division of Gastroenterology, Hepatology and Nutrition  [facility]

## 2019-01-15 NOTE — PROGRESS NOTES
GI CLINIC VISIT - NEW PATIENT    CC/REFERRING PROVIDER: Referred Self  REASON FOR CONSULTATION: IBD follow up  COLLABORATING PHYSICIAN: Niraj Toth MD    HPI: 28 year old female with history of Crohn's vs UC on Vedolizumab (failed adalimumab, and questionable antibodies to infliximab, now on vedolizumab since 6/19/2015) + AZA, initially diagnosed with Crohn's in 2013. Her symptoms at the time were bloody diarrhea, left sided abdominal pain and general fatigue. She was treated with Humira for 1 year but stopped after colonoscopy showed ongoing disease. Then was treated with Remicade for about 18 months but per patient report, thinks she developed antibodies to it. She was started on Vedolizumab in June of 2015 initially at 300 mg Q8 weeks which was increased to Q6 weeks and mostly recently to Q4 weeks in May 2018 after colonoscopy in April 2018 showed ongoing colitis. She has been on AZA since diagnosis, initially at 200 mg Qday which has been titrated down to 100 mg Qday.     She had her first occurrence of C. difficile infection after the stool studies were completed after last visit in October 2018.  She completed a vancomycin taper just recently.  She reports that this is the best she is ever felt.  She currently has 3 formed stools per day, no blood in the stool.  Occasional mucus in the morning.  Fatigue is improved.  No urgency or nighttime stools.    She also continues to follow with hematology as she was diagnosed with a LLE DVT last fall that was thought to be provoked and completed therapy with Eliquis.  She reports that she will be receiving IV iron infusions in the near future for borderline hemoglobin, which I note is normal most recently in October 2018.    No extraintestinal manifestations at this time.    ROS: 10pt ROS performed and otherwise negative.    PERTINENT PAST MEDICAL HISTORY:  As noted above.    PREVIOUS ABDOMINAL/GYNECOLOGIC SURGERIES:  None     PREVIOUS ENDOSCOPY:  Colonoscopy  4/23/2018 at [facility]: Mild colitis starting at the transverse colon (Borrego 1), progressing to more moderate colitis in the rectosigmoid (Borrego 2). Bx showed chronic colitis. TI and right colon were unremarkable endoscopically and bx showed mild chronic activity.     Flex sig 10/2/2018 shows Borrego 2/3, continuous, biopsies show moderate chronic active colitis.    PERTINENT MEDICATIONS:  - Azathioprine 100 mg Qday   - Vedolizumab 300 mg Q4W (last dose 9/21/18)  - Maria Victoria OCP   Medications reviewed with patient today, see Medication List/Assessment for details.  No other NSAID/anticoagulation reported by patient.  No other OTC/herbal/supplements reported by patient.    SOCIAL HISTORY:  - Works as   - Uses alcohol 1-2 times/month   - Smokes marijuana 2-3 times/week  - No tobacco   - Sexually active, uses condoms      FAMILY HISTORY:  No colon/panc/esophageal/other GI CA, no other Valverde or other HPS-related Keo. No IBD/celiac, no other AI/liver/thyroid disease. + for asthma and acne.     ASSESSMENT/PLAN:    1. Colonic Crohn's Disease with concern for indeterminate IBD/phenotypic UC, on her third biologic therapy (hx of Humira, Remicade) with a relative rapid dose escalation, recent DVT concern for ongoing inflammation (among other factors) and recent Cdiff infection, who presents for follow up:  --Reassuring that patient has achieved symptomatic remission.  We have yet to document endoscopic remission, and given patient had active C. difficile infection with last fecal calprotectin, we will repeat it.  If this continues to be elevated, we will repeat endoscopic assessment.  If ongoing inflammation is present we will need to determine next steps in therapy for optimization.  --Last visit we increased her azathioprine from 50 mg twice daily to 75 mg twice daily.  I do note that patient became leukopenic that most recent blood draw 12/14/2018, which could have been due to the increased dose of azathioprine.  We will  obtain thiopurine metabolites at her next blood draw.  Based on these metabolites and white blood cell count, can then determine how we can best optimize azathioprine.    --Endoscopic assessment if there is a persistent elevation in fecal calprotectin  -- If optimizing AZA is not effective, medical therapy would be limited to consider alternative biologic agents such as Stelara, given vedolizumab is already maximized.  Would likely not return to antiTNF given she did not have adequate response to adalimumab.  Surgical therapy should also be strongly considered.  - For now, continue Entyvio 300mg IV Q4 weeks (dose adjusted to Q4W in May 2018)  - Continue AZA 75 BID for now. Will add thiopurine levels to next draw.  - Labs with infusions to include LFT, CBC ESR/CRP    2. Cdiff infection: Difficult to determine how long patient has had an active infection in the setting of ongoing inflammation documented by most recent flexible sigmoidoscopy in October 2018.  Certainly active inflammatory bowel disease would be a patient at risk for infection such as C. difficile.  Patient completed a vancomycin taper with great symptomatic response.  There was no notable antibiotic trigger, concern for active IBD as trigger for active infection.  Will need to ensure endoscopic healing as stated above to help prevent frequent recurrent infections.    3. LLE DVT:   thought to be provoked (factors include prolonged car ride from [place] to [place] when moved here this summer, strain to gastrocnemius muscle on crutches for 6 weeks, on birth control and active IBD) and is currently on Eliquis.  Concern is of course for ongoing active IBD, leading to a hypercoagulable state.  We will continue to work to optimize IBD regimen as stated above.  Patient has now completed anticoagulation therapy (3 months with Eliquis).  She discontinued oral contraceptives.     4. Continue to monitor for the danger signs/symptoms we reviewed together today:   worsening abdominal pain, worsening diarrhea, obstructive symptoms (nausea/vomiting, abdominal distention, inability to pass stool/flatus), blood mixed into stools, persistent fevers/chills, progressive anemia (particulary with iron deficiency), difficulty with swallowing, perianal/rectal discomfort (particularly with defecation), unexpected weight loss, etc.  - Contact us via MyChart or phone should these symptoms occur, particularly as we may advise further evaluation accordingly.    4. IBD health maintenance   Vaccinations:  -- Influenza (every year): Last given 2018  -- TdaP (every 10 years): Last given 2014  -- Pneumococcal Pneumonia (once then every 5 years): Last given 2015  -- Yearly assessment for latent Tb (verbal screening and exam, PPD or QuantiFERON-Tb testing): Indeterminate due to anergy 2017     One time confirmation of immunity or serologies:  -- Hepatitis A (serologies or immunizations): Unknown  -- Hepatitis B (serologies or immunizations): Vaccinated  -- Varicella: had chicken pox as a child  -- MMR: [place]   -- HPV (all aged 18-26): Up to date  -- Meningococcal meningitis (all patients at risk for meningitis): Unknown  -- Due to the immunosuppression in this patient, I would not advise administration of live vaccines such as varicella/VZV, intranasal influenza, MMR, or yellow fever vaccine (if travelling).      Bone mineral density screening   -- DEXA WNL 2015    Cancer Screening:  Colon cancer screening: due 2020  Cervical cancer screening: Annual due to immunosupression    PHYSICAL EXAMINATION:  Vitals reviewed, AFVSS  Wt 168# today   Gen: aaox3, cooperative, pleasant, not diaphoretic, nad  HEENT: ncat, neck supple, normal op w/o ulcer/exudate, anicteric, mmm  Resp/CV without acute findings, not dyspneic/tachycardic  Abd: Nondistended  Ext: no c/c/e  Skin: warm, perfused, no jaundice  Neuro: grossly intact    PERTINENT STUDIES:  12/14/18 labs show:  LFT WNL, alb 3.5  wbc 3.7, hgb 11.7, plt  213, mcv 96  ESR/CRP 8/2.9  Iron studies, B12, vit D WNL in 2016  10/26/2018 fecal calprotectin 858.  3/14/18 fecal calprotectin was 2449  6TG 138  6MMP <500    RTC in 3 months, sooner if symptomatic.      Guero Arias PA-C  Division of Gastroenterology, Hepatology and Nutrition  [facility]         Answers for HPI/ROS submitted by the patient on 1/15/2019   General Symptoms: No  Skin Symptoms: No  HENT Symptoms: No  EYE SYMPTOMS: No  HEART SYMPTOMS: No  LUNG SYMPTOMS: No  INTESTINAL SYMPTOMS: No  URINARY SYMPTOMS: No  GYNECOLOGIC SYMPTOMS: No  BREAST SYMPTOMS: No  SKELETAL SYMPTOMS: No  BLOOD SYMPTOMS: Yes  NERVOUS SYSTEM SYMPTOMS: No  MENTAL HEALTH SYMPTOMS: No  Anemia: Yes  Swollen glands: No  Easy bleeding or bruising: No  Edema or swelling: No

## 2019-02-03 NOTE — PROGRESS NOTES
As request by FAWAD Gonsalez Thiopurine metabolites lab request was added to the patients therapy plan.     Guero Arias PA-C Koch, Noelle, RN Hey Noelle,     Can you be sure that thiopurine metabolites are drawn at her next infusion. I will place the order. Just will need to be sure we enter this as a one time draw in her therapy plan.     Thanks!     Guero

## 2019-02-08 ENCOUNTER — HOSPITAL ENCOUNTER (OUTPATIENT)
Facility: CLINIC | Age: 29
Setting detail: SPECIMEN
Discharge: HOME | End: 2019-02-08
Attending: INTERNAL MEDICINE | Admitting: INTERNAL MEDICINE
Payer: COMMERCIAL

## 2019-02-08 ENCOUNTER — INFUSION THERAPY VISIT (OUTPATIENT)
Dept: INFUSION THERAPY | Facility: CLINIC | Age: 29
End: 2019-02-08
Attending: INTERNAL MEDICINE
Payer: COMMERCIAL

## 2019-02-08 ENCOUNTER — TELEPHONE (OUTPATIENT)
Dept: GASTROENTEROLOGY | Facility: CLINIC | Age: 29
End: 2019-02-08

## 2019-02-08 VITALS
SYSTOLIC BLOOD PRESSURE: 124 MMHG | TEMPERATURE: 97.5 F | HEART RATE: 69 BPM | OXYGEN SATURATION: 100 % | RESPIRATION RATE: 16 BRPM | DIASTOLIC BLOOD PRESSURE: 66 MMHG

## 2019-02-08 DIAGNOSIS — K50.10 CROHN'S DISEASE OF LARGE INTESTINE WITHOUT COMPLICATION (H): Primary | ICD-10-CM

## 2019-02-08 LAB
ALBUMIN SERPL-MCNC: 3.9 G/DL (ref 3.4–5)
ALP SERPL-CCNC: 58 U/L (ref 40–150)
ALT SERPL W P-5'-P-CCNC: 12 U/L (ref 0–50)
AST SERPL W P-5'-P-CCNC: 15 U/L (ref 0–45)
BASOPHILS # BLD AUTO: 0.1 10E9/L (ref 0–0.2)
BASOPHILS NFR BLD AUTO: 0.8 %
BILIRUB DIRECT SERPL-MCNC: 0.2 MG/DL (ref 0–0.2)
BILIRUB SERPL-MCNC: 0.8 MG/DL (ref 0.2–1.3)
CRP SERPL-MCNC: 2.9 MG/L (ref 0–8)
DIFFERENTIAL METHOD BLD: ABNORMAL
EOSINOPHIL # BLD AUTO: 0.3 10E9/L (ref 0–0.7)
EOSINOPHIL NFR BLD AUTO: 4.1 %
ERYTHROCYTE [DISTWIDTH] IN BLOOD BY AUTOMATED COUNT: 13.2 % (ref 10–15)
ERYTHROCYTE [SEDIMENTATION RATE] IN BLOOD BY WESTERGREN METHOD: 9 MM/H (ref 0–20)
HCT VFR BLD AUTO: 35.4 % (ref 35–47)
HGB BLD-MCNC: 11.6 G/DL (ref 11.7–15.7)
IMM GRANULOCYTES # BLD: 0 10E9/L (ref 0–0.4)
IMM GRANULOCYTES NFR BLD: 0.7 %
LYMPHOCYTES # BLD AUTO: 1.3 10E9/L (ref 0.8–5.3)
LYMPHOCYTES NFR BLD AUTO: 22.1 %
MCH RBC QN AUTO: 31.7 PG (ref 26.5–33)
MCHC RBC AUTO-ENTMCNC: 32.8 G/DL (ref 31.5–36.5)
MCV RBC AUTO: 97 FL (ref 78–100)
MONOCYTES # BLD AUTO: 0.4 10E9/L (ref 0–1.3)
MONOCYTES NFR BLD AUTO: 7.1 %
NEUTROPHILS # BLD AUTO: 3.9 10E9/L (ref 1.6–8.3)
NEUTROPHILS NFR BLD AUTO: 65.2 %
NRBC # BLD AUTO: 0 10*3/UL
NRBC BLD AUTO-RTO: 0 /100
PLATELET # BLD AUTO: 223 10E9/L (ref 150–450)
PROT SERPL-MCNC: 8.1 G/DL (ref 6.8–8.8)
RBC # BLD AUTO: 3.66 10E12/L (ref 3.8–5.2)
WBC # BLD AUTO: 6.1 10E9/L (ref 4–11)

## 2019-02-08 PROCEDURE — 96365 THER/PROPH/DIAG IV INF INIT: CPT

## 2019-02-08 PROCEDURE — 25000128 H RX IP 250 OP 636: Performed by: INTERNAL MEDICINE

## 2019-02-08 PROCEDURE — 85025 COMPLETE CBC W/AUTO DIFF WBC: CPT | Performed by: INTERNAL MEDICINE

## 2019-02-08 PROCEDURE — 85652 RBC SED RATE AUTOMATED: CPT | Performed by: INTERNAL MEDICINE

## 2019-02-08 PROCEDURE — 80076 HEPATIC FUNCTION PANEL: CPT | Performed by: INTERNAL MEDICINE

## 2019-02-08 PROCEDURE — 86140 C-REACTIVE PROTEIN: CPT | Performed by: INTERNAL MEDICINE

## 2019-02-08 RX ADMIN — VEDOLIZUMAB 300 MG: 300 INJECTION, POWDER, LYOPHILIZED, FOR SOLUTION INTRAVENOUS at 13:39

## 2019-02-08 NOTE — TELEPHONE ENCOUNTER
Patient scheduled for infusion appt for Entyvio today at [facility] at 1300. Thiopurine Metabolites lab draw ordered, but needs to be done 1 hour prior to next Imuran dose. I contacted patient and she already took her Imuran today. She will go to  clinic another day and have lab drawn. [employer] message sent to FAWAD Gonsalez for notificiation.

## 2019-02-08 NOTE — PROGRESS NOTES
Infusion Nursing Note:  Natalie Villa presents today for Entyvio.    Patient seen by provider today: No   present during visit today: Not Applicable.    Note: Thiopurine Metabolites lab draw ordered, but needs to be done 1 hour prior to next Imuran dose. Patient already took her Imuran today. She will go to [facility] clinic another day and have lab drawn. [employer] message sent to FAWAD Gonsalez for notificiation.     Patient had been on Vancomycin taper for c-diff and finished this past week. Dr. Toth aware. Okay to proceed. Patient no longer having diarrhea. However, had 2 doses of injectafer the end of January and had some diarrhea following infusions. Currently having 2 loost stools daily. No fevers or abdominal cramping.     Intravenous Access:  Labs drawn without difficulty.  Peripheral IV placed.    Treatment Conditions:  Lab Results   Component Value Date    HGB 11.6 02/08/2019     Lab Results   Component Value Date    WBC 6.1 02/08/2019      Lab Results   Component Value Date    ANEU 3.9 02/08/2019     Lab Results   Component Value Date     02/08/2019      Hepatic panel, ESR, and CRP drawn.      Biological Infusion Checklist:    ~~~ NOTE: If the patient answers yes to any of the questions below, hold the infusion and contact ordering provider or on-call provider.    1. Have you recently had an elevated temperature, fever, chills, productive cough, coughing for 3 weeks or longer or hemoptysis,  abnormal vital signs, night sweats,  chest pain or have you noticed a decrease in your appetite, unexplained weight loss or fatigue? No  2. Do you have any open wounds or new incisions? No  3. Do you have any recent or upcoming hospitalizations, surgeries or dental procedures? No  4. Do you currently have or recently have had any signs of illness or infection or are you on any antibiotics? No, finished Vano taper earlier in the week.  5. Have you had any new, sudden or worsening abdominal pain?  No  6. Have you or anyone in your household received a live vaccination in the past 4 weeks? Please note:  No live vaccines while on biologic/chemotherapy until 6 months after the last treatment.  Patient can receive the flu vaccine (shot only) and the pneumovax.  It is optimal for the patient to get these vaccines mid cycle, but they can be given at any time as long as it is not on the day of the infusion. No  7. Have you recently been diagnosed with any new nervous system diseases (ie. Multiple sclerosis, Guillain [place], seizures, neurological changes) or cancer diagnosis? Are you on any form of radiation or chemotherapy? No  8. Are you pregnant or breast feeding or do you have plans of pregnancy in the future? No  9. Have you been having any signs of worsening depression or suicidal ideations?  (benlysta only) No  10. Have there been any other new onset medical symptoms? No        Post Infusion Assessment:  Patient tolerated infusion without incident.  Blood return noted pre and post infusion.  Site patent and intact, free from redness, edema or discomfort.  No evidence of extravasations.  Access discontinued per protocol.  Biologic Infusion Post Education: Call the triage nurse at your clinic or seek medical attention if you have chills and/or temperature greater than or equal to 100.5, uncontrolled nausea/vomiting, diarrhea, constipation, dizziness, shortness of breath, chest pain, heart palpitations, weakness or any other new or concerning symptoms, questions or concerns.  You cannot have any live virus vaccines prior to or during treatment or up to 6 months post infusion.  If you have an upcoming surgery, medical procedure or dental procedure during treatment, this should be discussed with your ordering physician and your surgeon/dentist.  If you are having any concerning symptom, if you are unsure if you should get your next infusion or wish to speak to a provider before your next infusion, please call your  care coordinator or triage nurse at your clinic to notify them so we can adequately serve you.    Discharge Plan:   Patient declined prescription refills.  Copy of AVS reviewed with patient and/or family.  Patient will call to make next appt.  Patient discharged in stable condition accompanied by: self.  Departure Mode: Ambulatory.    Laverne Og RN

## 2019-02-18 DIAGNOSIS — K51.00 ULCERATIVE PANCOLITIS WITHOUT COMPLICATION (H): ICD-10-CM

## 2019-02-18 PROCEDURE — 36415 COLL VENOUS BLD VENIPUNCTURE: CPT | Performed by: PHYSICIAN ASSISTANT

## 2019-02-18 PROCEDURE — 80299 QUANTITATIVE ASSAY DRUG: CPT | Mod: 90 | Performed by: PHYSICIAN ASSISTANT

## 2019-02-18 PROCEDURE — 99000 SPECIMEN HANDLING OFFICE-LAB: CPT | Performed by: PHYSICIAN ASSISTANT

## 2019-02-25 LAB
6-TGN ENTSUB RBC: 152 (ref 235–400)
6MMP ENTSUB RBC: 838

## 2019-02-26 NOTE — RESULT ENCOUNTER NOTE
Hi Summer,    Your azathioprine levels are within range and your white blood cell count is normalized.  Lets plan on staying at 75 mg twice per day for your azathioprine.      Guero Arias PA-C  Division of Gastroenterology, Hepatology and Nutrition  [facility]

## 2019-02-28 DIAGNOSIS — K50.10 CROHN'S DISEASE OF LARGE INTESTINE WITHOUT COMPLICATION (H): ICD-10-CM

## 2019-03-01 RX ORDER — AZATHIOPRINE 50 MG/1
75 TABLET ORAL 2 TIMES DAILY
Qty: 270 TABLET | Refills: 3 | Status: SHIPPED | OUTPATIENT
Start: 2019-03-01 | End: 2019-12-17

## 2019-03-01 NOTE — TELEPHONE ENCOUNTER
azaTHIOprine (IMURAN) 50 MG tablet     Last Written Prescription Date:  10/24/18  Last Fill Quantity: 180,   # refills: 1  Last Office Visit : 1/15/19  Future Office visit:  None    Lab Results   Component Value Date    WBC 6.1 02/08/2019     Lab Results   Component Value Date    RBC 3.66 02/08/2019     Lab Results   Component Value Date    HGB 11.6 02/08/2019     Lab Results   Component Value Date    HCT 35.4 02/08/2019     No components found for: MCT  Lab Results   Component Value Date    MCV 97 02/08/2019     Lab Results   Component Value Date    MCH 31.7 02/08/2019     Lab Results   Component Value Date    MCHC 32.8 02/08/2019     Lab Results   Component Value Date    RDW 13.2 02/08/2019     Lab Results   Component Value Date     02/08/2019     Lab Results   Component Value Date    AST 15 02/08/2019     Lab Results   Component Value Date    ALT 12 02/08/2019     Dosing alert above recommended. Per last rf.  Overridden by Guero Arias PA-C on Oct 24, 2018 3:29 PM   Dose   1. AZATHIOPRINE, 75 MG, ORAL, 2 TIMES DAILY  FREQUENCY EXCEEDED BY 1 DOSES/DAY

## 2019-03-08 ENCOUNTER — INFUSION THERAPY VISIT (OUTPATIENT)
Dept: INFUSION THERAPY | Facility: CLINIC | Age: 29
End: 2019-03-08
Attending: INTERNAL MEDICINE
Payer: COMMERCIAL

## 2019-03-08 ENCOUNTER — HOSPITAL ENCOUNTER (OUTPATIENT)
Facility: CLINIC | Age: 29
Setting detail: SPECIMEN
Discharge: HOME | End: 2019-03-08
Attending: INTERNAL MEDICINE | Admitting: INTERNAL MEDICINE
Payer: COMMERCIAL

## 2019-03-08 ENCOUNTER — PATIENT OUTREACH (OUTPATIENT)
Dept: GASTROENTEROLOGY | Facility: CLINIC | Age: 29
End: 2019-03-08

## 2019-03-08 VITALS
SYSTOLIC BLOOD PRESSURE: 106 MMHG | TEMPERATURE: 97.2 F | OXYGEN SATURATION: 100 % | WEIGHT: 179 LBS | BODY MASS INDEX: 29.33 KG/M2 | HEART RATE: 70 BPM | DIASTOLIC BLOOD PRESSURE: 66 MMHG

## 2019-03-08 DIAGNOSIS — K51.00 ULCERATIVE PANCOLITIS (H): ICD-10-CM

## 2019-03-08 DIAGNOSIS — K51.00 ULCERATIVE PANCOLITIS (H): Primary | ICD-10-CM

## 2019-03-08 DIAGNOSIS — K50.10 CROHN'S DISEASE OF LARGE INTESTINE WITHOUT COMPLICATION (H): Primary | ICD-10-CM

## 2019-03-08 LAB
BASOPHILS # BLD AUTO: 0 10E9/L (ref 0–0.2)
BASOPHILS NFR BLD AUTO: 0.5 %
DIFFERENTIAL METHOD BLD: ABNORMAL
EOSINOPHIL # BLD AUTO: 0.2 10E9/L (ref 0–0.7)
EOSINOPHIL NFR BLD AUTO: 4.7 %
ERYTHROCYTE [DISTWIDTH] IN BLOOD BY AUTOMATED COUNT: 12.5 % (ref 10–15)
FERRITIN SERPL-MCNC: 262 NG/ML (ref 12–150)
HCT VFR BLD AUTO: 38.7 % (ref 35–47)
HGB BLD-MCNC: 12.3 G/DL (ref 11.7–15.7)
IMM GRANULOCYTES # BLD: 0 10E9/L (ref 0–0.4)
IMM GRANULOCYTES NFR BLD: 0.5 %
IRON SATN MFR SERPL: 70 % (ref 15–46)
IRON SERPL-MCNC: 132 UG/DL (ref 35–180)
LYMPHOCYTES # BLD AUTO: 1 10E9/L (ref 0.8–5.3)
LYMPHOCYTES NFR BLD AUTO: 25.4 %
MCH RBC QN AUTO: 32.2 PG (ref 26.5–33)
MCHC RBC AUTO-ENTMCNC: 31.8 G/DL (ref 31.5–36.5)
MCV RBC AUTO: 101 FL (ref 78–100)
MONOCYTES # BLD AUTO: 0.4 10E9/L (ref 0–1.3)
MONOCYTES NFR BLD AUTO: 9.4 %
NEUTROPHILS # BLD AUTO: 2.3 10E9/L (ref 1.6–8.3)
NEUTROPHILS NFR BLD AUTO: 59.5 %
NRBC # BLD AUTO: 0 10*3/UL
NRBC BLD AUTO-RTO: 0 /100
PLATELET # BLD AUTO: 201 10E9/L (ref 150–450)
RBC # BLD AUTO: 3.82 10E12/L (ref 3.8–5.2)
TIBC SERPL-MCNC: 188 UG/DL (ref 240–430)
WBC # BLD AUTO: 3.8 10E9/L (ref 4–11)

## 2019-03-08 PROCEDURE — 83550 IRON BINDING TEST: CPT | Performed by: INTERNAL MEDICINE

## 2019-03-08 PROCEDURE — 25800030 ZZH RX IP 258 OP 636: Performed by: INTERNAL MEDICINE

## 2019-03-08 PROCEDURE — 96365 THER/PROPH/DIAG IV INF INIT: CPT

## 2019-03-08 PROCEDURE — 25000128 H RX IP 250 OP 636: Performed by: INTERNAL MEDICINE

## 2019-03-08 PROCEDURE — 83540 ASSAY OF IRON: CPT | Performed by: INTERNAL MEDICINE

## 2019-03-08 PROCEDURE — 85025 COMPLETE CBC W/AUTO DIFF WBC: CPT | Performed by: INTERNAL MEDICINE

## 2019-03-08 PROCEDURE — 82728 ASSAY OF FERRITIN: CPT | Performed by: INTERNAL MEDICINE

## 2019-03-08 RX ORDER — LACTOBACIL 2/BIFIDO 1/S.THERMO 450B CELL
1 PACKET (EA) ORAL 2 TIMES DAILY
Qty: 2 EACH | Refills: 3 | Status: SHIPPED | OUTPATIENT
Start: 2019-03-08 | End: 2020-05-22

## 2019-03-08 RX ADMIN — SODIUM CHLORIDE 250 ML: 9 INJECTION, SOLUTION INTRAVENOUS at 08:52

## 2019-03-08 RX ADMIN — VEDOLIZUMAB 300 MG: 300 INJECTION, POWDER, LYOPHILIZED, FOR SOLUTION INTRAVENOUS at 08:52

## 2019-03-08 NOTE — PROGRESS NOTES
Infusion Nursing Note:  Natalie Villa presents today for Entyvio.    Patient seen by provider today: No   present during visit today: Not Applicable.    Note: Natalie is still having loose stool. Dr. Toth did order a calprotectin feces test for her, which has not been completed yet. Nurse contacted Dr. Toth via [employer] to let him know about diarrhea and request a c-diff test to be ordered if MD feels it is necessary.    Intravenous Access:  Peripheral IV placed.    Treatment Conditions:  Biological Infusion Checklist:    ~~~ NOTE: If the patient answers yes to any of the questions below, hold the infusion and contact ordering provider or on-call provider.    1. Have you recently had an elevated temperature, fever, chills, productive cough, coughing for 3 weeks or longer or hemoptysis,  abnormal vital signs, night sweats,  chest pain or have you noticed a decrease in your appetite, unexplained weight loss or fatigue? No  2. Do you have any open wounds or new incisions? No  3. Do you have any recent or upcoming hospitalizations, surgeries or dental procedures? No  4. Do you currently have or recently have had any signs of illness or infection or are you on any antibiotics? No  5. Have you had any new, sudden or worsening abdominal pain? No  6. Have you or anyone in your household received a live vaccination in the past 4 weeks? Please note:  No live vaccines while on biologic/chemotherapy until 6 months after the last treatment.  Patient can receive the flu vaccine (shot only) and the pneumovax.  It is optimal for the patient to get these vaccines mid cycle, but they can be given at any time as long as it is not on the day of the infusion. No  7. Have you recently been diagnosed with any new nervous system diseases (ie. Multiple sclerosis, Guillain [place], seizures, neurological changes) or cancer diagnosis? Are you on any form of radiation or chemotherapy? No  8. Are you pregnant or breast feeding  or do you have plans of pregnancy in the future? No  9. Have you been having any signs of worsening depression or suicidal ideations?  (benlysta only) No  10. Have there been any other new onset medical symptoms? No        Post Infusion Assessment:  Patient tolerated infusion without incident.  Blood return noted pre and post infusion.  Site patent and intact, free from redness, edema or discomfort.  No evidence of extravasations.  Access discontinued per protocol.  Biologic Infusion Post Education: Call the triage nurse at your clinic or seek medical attention if you have chills and/or temperature greater than or equal to 100.5, uncontrolled nausea/vomiting, diarrhea, constipation, dizziness, shortness of breath, chest pain, heart palpitations, weakness or any other new or concerning symptoms, questions or concerns.  You cannot have any live virus vaccines prior to or during treatment or up to 6 months post infusion.  If you have an upcoming surgery, medical procedure or dental procedure during treatment, this should be discussed with your ordering physician and your surgeon/dentist.  If you are having any concerning symptom, if you are unsure if you should get your next infusion or wish to speak to a provider before your next infusion, please call your care coordinator or triage nurse at your clinic to notify them so we can adequately serve you.    Discharge Plan:   Patient declined prescription refills.  Discharge instructions reviewed with: Patient.  Patient and/or family verbalized understanding of discharge instructions and all questions answered.  Copy of AVS reviewed with patient and/or family.  Patient will return 4/5/19 for next appointment.  Patient discharged in stable condition accompanied by: self.  Departure Mode: Ambulatory.    Felisa Samano RN

## 2019-03-09 ENCOUNTER — HOSPITAL ENCOUNTER (OUTPATIENT)
Dept: LAB | Facility: CLINIC | Age: 29
Discharge: HOME | End: 2019-03-09
Attending: INTERNAL MEDICINE | Admitting: INTERNAL MEDICINE
Payer: COMMERCIAL

## 2019-03-09 DIAGNOSIS — K50.10 CROHN'S DISEASE OF LARGE INTESTINE WITHOUT COMPLICATION (H): ICD-10-CM

## 2019-03-09 DIAGNOSIS — K51.00 ULCERATIVE PANCOLITIS WITHOUT COMPLICATION (H): ICD-10-CM

## 2019-03-09 LAB
C DIFF TOX B STL QL: POSITIVE
SPECIMEN SOURCE: ABNORMAL

## 2019-03-09 PROCEDURE — 83993 ASSAY FOR CALPROTECTIN FECAL: CPT | Performed by: PHYSICIAN ASSISTANT

## 2019-03-09 PROCEDURE — 87493 C DIFF AMPLIFIED PROBE: CPT | Performed by: INTERNAL MEDICINE

## 2019-03-12 ENCOUNTER — PATIENT OUTREACH (OUTPATIENT)
Dept: GASTROENTEROLOGY | Facility: CLINIC | Age: 29
End: 2019-03-12

## 2019-03-12 DIAGNOSIS — K50.90 CROHN'S DISEASE (H): Primary | ICD-10-CM

## 2019-03-12 LAB — CALPROTECTIN STL-MCNT: 2493.4 MG/KG (ref 0–49.9)

## 2019-03-12 NOTE — PROGRESS NOTES
Edited therapy plan to add folate and vitamin b12. Orders placed.        Mixed iron profile results, could be reflecting components of active inflammation +/- recent iron replacement +/- anemia of chronic inflammatory disease.     Slightly low WBC count which is nonspecific (particularly given the normal Absolute Neutrophil Count), just warrants monitoring for now.     Mildly elevated MCV, may need to B12 and folate levels checked if not done recently

## 2019-03-14 ENCOUNTER — MYC MEDICAL ADVICE (OUTPATIENT)
Dept: PULMONOLOGY | Facility: CLINIC | Age: 29
End: 2019-03-14

## 2019-03-14 ENCOUNTER — PATIENT OUTREACH (OUTPATIENT)
Dept: GASTROENTEROLOGY | Facility: CLINIC | Age: 29
End: 2019-03-14

## 2019-03-14 DIAGNOSIS — A04.72 C. DIFFICILE COLITIS: Primary | ICD-10-CM

## 2019-03-14 RX ORDER — VANCOMYCIN HYDROCHLORIDE 125 MG/1
125 CAPSULE ORAL 4 TIMES DAILY
Qty: 40 CAPSULE | Refills: 0 | Status: SHIPPED | OUTPATIENT
Start: 2019-03-14 | End: 2019-03-14

## 2019-03-14 RX ORDER — VANCOMYCIN HYDROCHLORIDE 125 MG/1
125 CAPSULE ORAL 4 TIMES DAILY
Qty: 40 CAPSULE | Refills: 0 | Status: SHIPPED | OUTPATIENT
Start: 2019-03-14 | End: 2019-05-03

## 2019-03-14 NOTE — TELEPHONE ENCOUNTER
[facility] Call Center    Phone Message    May a detailed message be left on voicemail: yes    Reason for Call: Other: Pt states her pharmacy has still not received the script for rx for VSL#3 and she has a question about her recent test results. Please call her as soon as possible      Action Taken: Message routed to:  [facility] ([facility]): see above

## 2019-03-15 DIAGNOSIS — A04.72 COLITIS DUE TO CLOSTRIDIUM DIFFICILE: Primary | ICD-10-CM

## 2019-03-15 RX ORDER — VANCOMYCIN HYDROCHLORIDE 125 MG/1
CAPSULE ORAL
Qty: 85 CAPSULE | Refills: 0 | Status: SHIPPED | OUTPATIENT
Start: 2019-03-15 | End: 2019-05-03

## 2019-03-15 NOTE — PROGRESS NOTES
Cdiff positive again, spontaneous recurrence without an antibiotic trigger.      Per Dr. Dodson:  Can we treat her with PO vancomycin 125mg x 10 days, then place her on a suppressive dose of 125mg.   After about 3-4 weeks on vancomycin, we should re-check fecal calprotectin.   If fecal esvin still elevated suggests underlying UC activity.   If fecal esvin normal - suggests all prior inflammation was related to C diff and I will see for FMT.       PLAN  -- Rx's Vanco QID x 10 days followed by daily dosing (to be continued for 3-4 weeks)  -- Placed order for patient to submit a fecal esvin around mid April once she has completed the course above.  -- Will then determine optimization of IBD meds vs. FMT pending fecal esvin results  -- Return to clinic end of April for follow up  -- Discussed with patient the above plan      NESHA JordanC  Division of Gastroenterology, Hepatology and Nutrition  [facility]

## 2019-04-05 ENCOUNTER — INFUSION THERAPY VISIT (OUTPATIENT)
Dept: INFUSION THERAPY | Facility: CLINIC | Age: 29
End: 2019-04-05
Attending: INTERNAL MEDICINE
Payer: COMMERCIAL

## 2019-04-05 ENCOUNTER — HOSPITAL ENCOUNTER (OUTPATIENT)
Facility: CLINIC | Age: 29
Setting detail: SPECIMEN
Discharge: HOME | End: 2019-04-05
Attending: INTERNAL MEDICINE | Admitting: PHYSICIAN ASSISTANT
Payer: COMMERCIAL

## 2019-04-05 VITALS
DIASTOLIC BLOOD PRESSURE: 73 MMHG | HEART RATE: 58 BPM | TEMPERATURE: 96.6 F | SYSTOLIC BLOOD PRESSURE: 111 MMHG | RESPIRATION RATE: 16 BRPM | OXYGEN SATURATION: 100 %

## 2019-04-05 DIAGNOSIS — K50.10 CROHN'S DISEASE OF LARGE INTESTINE WITHOUT COMPLICATION (H): Primary | ICD-10-CM

## 2019-04-05 DIAGNOSIS — K50.90 CROHN'S DISEASE (H): ICD-10-CM

## 2019-04-05 LAB
ALBUMIN SERPL-MCNC: 4.1 G/DL (ref 3.4–5)
ALP SERPL-CCNC: 59 U/L (ref 40–150)
ALT SERPL W P-5'-P-CCNC: 12 U/L (ref 0–50)
AST SERPL W P-5'-P-CCNC: 10 U/L (ref 0–45)
BASOPHILS # BLD AUTO: 0 10E9/L (ref 0–0.2)
BASOPHILS NFR BLD AUTO: 0.6 %
BILIRUB DIRECT SERPL-MCNC: 0.2 MG/DL (ref 0–0.2)
BILIRUB SERPL-MCNC: 0.9 MG/DL (ref 0.2–1.3)
CRP SERPL-MCNC: <2.9 MG/L (ref 0–8)
DIFFERENTIAL METHOD BLD: NORMAL
EOSINOPHIL # BLD AUTO: 0.3 10E9/L (ref 0–0.7)
EOSINOPHIL NFR BLD AUTO: 5.7 %
ERYTHROCYTE [DISTWIDTH] IN BLOOD BY AUTOMATED COUNT: 12.6 % (ref 10–15)
ERYTHROCYTE [SEDIMENTATION RATE] IN BLOOD BY WESTERGREN METHOD: 8 MM/H (ref 0–20)
FOLATE SERPL-MCNC: 19.7 NG/ML
HCT VFR BLD AUTO: 38.1 % (ref 35–47)
HGB BLD-MCNC: 12.5 G/DL (ref 11.7–15.7)
IMM GRANULOCYTES # BLD: 0 10E9/L (ref 0–0.4)
IMM GRANULOCYTES NFR BLD: 0.4 %
LYMPHOCYTES # BLD AUTO: 1.3 10E9/L (ref 0.8–5.3)
LYMPHOCYTES NFR BLD AUTO: 24.4 %
MCH RBC QN AUTO: 32.5 PG (ref 26.5–33)
MCHC RBC AUTO-ENTMCNC: 32.8 G/DL (ref 31.5–36.5)
MCV RBC AUTO: 99 FL (ref 78–100)
MONOCYTES # BLD AUTO: 0.4 10E9/L (ref 0–1.3)
MONOCYTES NFR BLD AUTO: 7.2 %
NEUTROPHILS # BLD AUTO: 3.2 10E9/L (ref 1.6–8.3)
NEUTROPHILS NFR BLD AUTO: 61.7 %
NRBC # BLD AUTO: 0 10*3/UL
NRBC BLD AUTO-RTO: 0 /100
PLATELET # BLD AUTO: 220 10E9/L (ref 150–450)
PROT SERPL-MCNC: 8.5 G/DL (ref 6.8–8.8)
RBC # BLD AUTO: 3.85 10E12/L (ref 3.8–5.2)
VIT B12 SERPL-MCNC: 380 PG/ML (ref 193–986)
WBC # BLD AUTO: 5.1 10E9/L (ref 4–11)

## 2019-04-05 PROCEDURE — 96365 THER/PROPH/DIAG IV INF INIT: CPT

## 2019-04-05 PROCEDURE — 85652 RBC SED RATE AUTOMATED: CPT | Performed by: INTERNAL MEDICINE

## 2019-04-05 PROCEDURE — 82746 ASSAY OF FOLIC ACID SERUM: CPT | Performed by: INTERNAL MEDICINE

## 2019-04-05 PROCEDURE — 25800030 ZZH RX IP 258 OP 636: Performed by: INTERNAL MEDICINE

## 2019-04-05 PROCEDURE — 25000128 H RX IP 250 OP 636: Performed by: INTERNAL MEDICINE

## 2019-04-05 PROCEDURE — 80299 QUANTITATIVE ASSAY DRUG: CPT | Performed by: PHYSICIAN ASSISTANT

## 2019-04-05 PROCEDURE — 86140 C-REACTIVE PROTEIN: CPT | Performed by: INTERNAL MEDICINE

## 2019-04-05 PROCEDURE — 80076 HEPATIC FUNCTION PANEL: CPT | Performed by: INTERNAL MEDICINE

## 2019-04-05 PROCEDURE — 82607 VITAMIN B-12: CPT | Performed by: INTERNAL MEDICINE

## 2019-04-05 PROCEDURE — 85025 COMPLETE CBC W/AUTO DIFF WBC: CPT | Performed by: INTERNAL MEDICINE

## 2019-04-05 RX ADMIN — VEDOLIZUMAB 300 MG: 300 INJECTION, POWDER, LYOPHILIZED, FOR SOLUTION INTRAVENOUS at 13:33

## 2019-04-05 NOTE — PROGRESS NOTES
Infusion Nursing Note:  Natalie Villa presents today for Entyvio.    Patient seen by provider today: No   present during visit today: Not Applicable.    Note: Pt states she is still on the vancomycin for the treatment of C.Diff, but states symptoms are much improved. Denies any current diarrhea/loose stools, abdominal pain, or bleeding.    Intravenous Access:  Labs drawn without difficulty.  Peripheral IV placed.    Treatment Conditions:  Biological Infusion Checklist:    ~~~ NOTE: If the patient answers yes to any of the questions below, hold the infusion and contact ordering provider or on-call provider.    1. Have you recently had an elevated temperature, fever, chills, productive cough, coughing for 3 weeks or longer or hemoptysis,  abnormal vital signs, night sweats,  chest pain or have you noticed a decrease in your appetite, unexplained weight loss or fatigue? No  2. Do you have any open wounds or new incisions? No  3. Do you have any recent or upcoming hospitalizations, surgeries or dental procedures? No  4. Do you currently have or recently have had any signs of illness or infection or are you on any antibiotics? Yes, Vancomycin  5. Have you had any new, sudden or worsening abdominal pain? No  6. Have you or anyone in your household received a live vaccination in the past 4 weeks? Please note:  No live vaccines while on biologic/chemotherapy until 6 months after the last treatment.  Patient can receive the flu vaccine (shot only) and the pneumovax.  It is optimal for the patient to get these vaccines mid cycle, but they can be given at any time as long as it is not on the day of the infusion. No  7. Have you recently been diagnosed with any new nervous system diseases (ie. Multiple sclerosis, Guillain [place], seizures, neurological changes) or cancer diagnosis? Are you on any form of radiation or chemotherapy? No  8. Are you pregnant or breast feeding or do you have plans of pregnancy in the  future? No  9. Have you been having any signs of worsening depression or suicidal ideations?  (benlysta only) No  10. Have there been any other new onset medical symptoms? No        Post Infusion Assessment:  Patient tolerated infusion without incident.  Blood return noted pre and post infusion.  Site patent and intact, free from redness, edema or discomfort.  No evidence of extravasations.  Access discontinued per protocol.  Biologic Infusion Post Education: Call the triage nurse at your clinic or seek medical attention if you have chills and/or temperature greater than or equal to 100.5, uncontrolled nausea/vomiting, diarrhea, constipation, dizziness, shortness of breath, chest pain, heart palpitations, weakness or any other new or concerning symptoms, questions or concerns.  You cannot have any live virus vaccines prior to or during treatment or up to 6 months post infusion.  If you have an upcoming surgery, medical procedure or dental procedure during treatment, this should be discussed with your ordering physician and your surgeon/dentist.  If you are having any concerning symptom, if you are unsure if you should get your next infusion or wish to speak to a provider before your next infusion, please call your care coordinator or triage nurse at your clinic to notify them so we can adequately serve you.       Discharge Plan:   Discharge instructions reviewed with: Patient.  Patient and/or family verbalized understanding of discharge instructions and all questions answered.  Copy of AVS reviewed with patient and/or family.  Patient will return 5/3/19 for next appointment.  Patient discharged in stable condition accompanied by: self.  Departure Mode: Ambulatory.    Yesi Barry RN

## 2019-04-11 LAB
6-TGN ENTSUB RBC: 356 (ref 235–400)
6MMP ENTSUB RBC: 817

## 2019-04-19 ENCOUNTER — HOSPITAL ENCOUNTER (OUTPATIENT)
Dept: LAB | Facility: CLINIC | Age: 29
Discharge: HOME | End: 2019-04-19
Attending: PHYSICIAN ASSISTANT | Admitting: PHYSICIAN ASSISTANT
Payer: COMMERCIAL

## 2019-04-19 DIAGNOSIS — A04.72 COLITIS DUE TO CLOSTRIDIUM DIFFICILE: ICD-10-CM

## 2019-04-19 PROCEDURE — 83993 ASSAY FOR CALPROTECTIN FECAL: CPT | Performed by: PHYSICIAN ASSISTANT

## 2019-04-23 LAB — CALPROTECTIN STL-MCNT: 312.7 MG/KG (ref 0–49.9)

## 2019-05-01 ENCOUNTER — TELEPHONE (OUTPATIENT)
Dept: GASTROENTEROLOGY | Facility: CLINIC | Age: 29
End: 2019-05-01

## 2019-05-01 DIAGNOSIS — K51.00 ULCERATIVE PANCOLITIS WITHOUT COMPLICATION (H): Primary | ICD-10-CM

## 2019-05-01 NOTE — PROGRESS NOTES
Fecal esvin elevated to 312.7. After discussed with Dr. Toth, will proceed with colonoscopy to view interval change since last colonoscopy.  Patient to remain on daily vanco at this time.  Will try to optimize IBD before FMT if that is indicated.      Guero Arias PA-C  Division of Gastroenterology, Hepatology and Nutrition  [facility]

## 2019-05-03 ENCOUNTER — HOSPITAL ENCOUNTER (OUTPATIENT)
Facility: CLINIC | Age: 29
Setting detail: SPECIMEN
End: 2019-05-03
Attending: INTERNAL MEDICINE
Payer: COMMERCIAL

## 2019-05-03 ENCOUNTER — INFUSION THERAPY VISIT (OUTPATIENT)
Dept: INFUSION THERAPY | Facility: CLINIC | Age: 29
End: 2019-05-03
Attending: INTERNAL MEDICINE
Payer: COMMERCIAL

## 2019-05-03 VITALS
WEIGHT: 181.6 LBS | HEART RATE: 71 BPM | SYSTOLIC BLOOD PRESSURE: 102 MMHG | DIASTOLIC BLOOD PRESSURE: 69 MMHG | OXYGEN SATURATION: 97 % | TEMPERATURE: 97.4 F | RESPIRATION RATE: 16 BRPM | BODY MASS INDEX: 29.76 KG/M2

## 2019-05-03 DIAGNOSIS — K50.10 CROHN'S DISEASE OF LARGE INTESTINE WITHOUT COMPLICATION (H): Primary | ICD-10-CM

## 2019-05-03 PROCEDURE — 96365 THER/PROPH/DIAG IV INF INIT: CPT

## 2019-05-03 PROCEDURE — 25000128 H RX IP 250 OP 636: Performed by: INTERNAL MEDICINE

## 2019-05-03 PROCEDURE — 25800030 ZZH RX IP 258 OP 636: Performed by: INTERNAL MEDICINE

## 2019-05-03 RX ADMIN — VEDOLIZUMAB 300 MG: 300 INJECTION, POWDER, LYOPHILIZED, FOR SOLUTION INTRAVENOUS at 08:43

## 2019-05-03 ASSESSMENT — PAIN SCALES - GENERAL: PAINLEVEL: NO PAIN (0)

## 2019-05-03 NOTE — PROGRESS NOTES
Infusion Nursing Note:  Natalie Villa presents today for Entyvio.    Patient seen by provider today: No   present during visit today: Not Applicable.    Note: Called Dr Toth to discuss Yes answers below. OK to proceed with Entyvio today.   TORB Dr MALA Toth/ [facility].    Intravenous Access:  Peripheral IV placed.  Labs drawn last infusion, not needed today.    Treatment Conditions:  Biological Infusion Checklist:  ~~~ NOTE: If the patient answers yes to any of the questions below, hold the infusion and contact ordering provider or on-call provider.    1. Have you recently had an elevated temperature, fever, chills, productive cough, coughing for 3 weeks or longer or hemoptysis, abnormal vital signs, night sweats,  chest pain or have you noticed a decrease in your appetite, unexplained weight loss or fatigue? No  2. Do you have any open wounds or new incisions? No  3. Do you have any recent or upcoming hospitalizations, surgeries or dental procedures? No- does have colonoscopy scheduled for today  4. Do you currently have or recently have had any signs of illness or infection or are you on any antibiotics? No  5. Have you had any new, sudden or worsening abdominal pain? Yes- had abd pain on Monday, has since subsided. Pt believes it was due to taking Plan B pill.  6. Have you or anyone in your household received a live vaccination in the past 4 weeks? Please note:  No live vaccines while on biologic/chemotherapy until 6 months after the last treatment.  Patient can receive the flu vaccine (shot only) and the pneumovax.  It is optimal for the patient to get these vaccines mid cycle, but they can be given at any time as long as it is not on the day of the infusion. No  7. Have you recently been diagnosed with any new nervous system diseases (ie. Multiple sclerosis, Guillain [place], seizures, neurological changes) or cancer diagnosis? No  8. Are you on any form of radiation or chemotherapy?  No  9. Are you pregnant or breast feeding or do you have plans of pregnancy in the future? No  10. Have you been having any signs of worsening depression or suicidal ideations?  (benlysta only) No  11. Have there been any other new onset medical symptoms? No    Post Infusion Assessment:  Patient tolerated infusion without incident.  Blood return noted pre and post infusion.  Site patent and intact, free from redness, edema or discomfort.  No evidence of extravasations.  Access discontinued per protocol.       Discharge Plan:   Discharge instructions reviewed with: Patient.  Patient and/or family verbalized understanding of discharge instructions and all questions answered.  Copy of AVS reviewed with patient and/or family.  Patient will return 5/31 for next appointment.  Patient discharged in stable condition accompanied by: self.  Departure Mode: Ambulatory.    Maria Luisa Toure RN

## 2019-05-09 ENCOUNTER — TELEPHONE (OUTPATIENT)
Dept: GASTROENTEROLOGY | Facility: OUTPATIENT CENTER | Age: 29
End: 2019-05-09

## 2019-05-13 ENCOUNTER — TELEPHONE (OUTPATIENT)
Dept: GASTROENTEROLOGY | Facility: OUTPATIENT CENTER | Age: 29
End: 2019-05-13

## 2019-05-13 DIAGNOSIS — K51.00 ULCERATIVE PANCOLITIS (H): Primary | ICD-10-CM

## 2019-05-16 ENCOUNTER — DOCUMENTATION ONLY (OUTPATIENT)
Dept: GASTROENTEROLOGY | Facility: OUTPATIENT CENTER | Age: 29
End: 2019-05-16
Payer: COMMERCIAL

## 2019-05-16 ENCOUNTER — TRANSFERRED RECORDS (OUTPATIENT)
Dept: HEALTH INFORMATION MANAGEMENT | Facility: CLINIC | Age: 29
End: 2019-05-16

## 2019-05-21 LAB — COPATH REPORT: NORMAL

## 2019-05-31 ENCOUNTER — HOSPITAL ENCOUNTER (OUTPATIENT)
Facility: CLINIC | Age: 29
Setting detail: SPECIMEN
Discharge: HOME | End: 2019-05-31
Attending: INTERNAL MEDICINE | Admitting: INTERNAL MEDICINE
Payer: COMMERCIAL

## 2019-05-31 ENCOUNTER — INFUSION THERAPY VISIT (OUTPATIENT)
Dept: INFUSION THERAPY | Facility: CLINIC | Age: 29
End: 2019-05-31
Attending: INTERNAL MEDICINE
Payer: COMMERCIAL

## 2019-05-31 VITALS
BODY MASS INDEX: 29.5 KG/M2 | SYSTOLIC BLOOD PRESSURE: 112 MMHG | WEIGHT: 180 LBS | OXYGEN SATURATION: 99 % | HEART RATE: 73 BPM | DIASTOLIC BLOOD PRESSURE: 73 MMHG | TEMPERATURE: 97.2 F | RESPIRATION RATE: 16 BRPM

## 2019-05-31 DIAGNOSIS — K50.10 CROHN'S DISEASE OF LARGE INTESTINE WITHOUT COMPLICATION (H): Primary | ICD-10-CM

## 2019-05-31 LAB
ALBUMIN SERPL-MCNC: 3.9 G/DL (ref 3.4–5)
ALP SERPL-CCNC: 56 U/L (ref 40–150)
ALT SERPL W P-5'-P-CCNC: 11 U/L (ref 0–50)
AST SERPL W P-5'-P-CCNC: 8 U/L (ref 0–45)
BASOPHILS # BLD AUTO: 0 10E9/L (ref 0–0.2)
BASOPHILS NFR BLD AUTO: 0.9 %
BILIRUB DIRECT SERPL-MCNC: 0.3 MG/DL (ref 0–0.2)
BILIRUB SERPL-MCNC: 1 MG/DL (ref 0.2–1.3)
CRP SERPL-MCNC: <2.9 MG/L (ref 0–8)
DIFFERENTIAL METHOD BLD: NORMAL
EOSINOPHIL # BLD AUTO: 0.3 10E9/L (ref 0–0.7)
EOSINOPHIL NFR BLD AUTO: 7 %
ERYTHROCYTE [DISTWIDTH] IN BLOOD BY AUTOMATED COUNT: 12.2 % (ref 10–15)
ERYTHROCYTE [SEDIMENTATION RATE] IN BLOOD BY WESTERGREN METHOD: 7 MM/H (ref 0–20)
FERRITIN SERPL-MCNC: 238 NG/ML (ref 12–150)
FERRITIN SERPL-MCNC: NORMAL NG/ML (ref 12–150)
FOLATE SERPL-MCNC: 14.4 NG/ML
HCT VFR BLD AUTO: 38.6 % (ref 35–47)
HGB BLD-MCNC: 12.6 G/DL (ref 11.7–15.7)
IMM GRANULOCYTES # BLD: 0 10E9/L (ref 0–0.4)
IMM GRANULOCYTES NFR BLD: 0.4 %
IRON SATN MFR SERPL: 43 % (ref 15–46)
IRON SATN MFR SERPL: NORMAL % (ref 15–50)
IRON SERPL-MCNC: 86 UG/DL (ref 35–180)
IRON SERPL-MCNC: NORMAL UG/DL (ref 35–180)
LYMPHOCYTES # BLD AUTO: 1.1 10E9/L (ref 0.8–5.3)
LYMPHOCYTES NFR BLD AUTO: 24.5 %
MCH RBC QN AUTO: 32.6 PG (ref 26.5–33)
MCHC RBC AUTO-ENTMCNC: 32.6 G/DL (ref 31.5–36.5)
MCV RBC AUTO: 100 FL (ref 78–100)
MONOCYTES # BLD AUTO: 0.4 10E9/L (ref 0–1.3)
MONOCYTES NFR BLD AUTO: 8.1 %
NEUTROPHILS # BLD AUTO: 2.6 10E9/L (ref 1.6–8.3)
NEUTROPHILS NFR BLD AUTO: 59.1 %
NRBC # BLD AUTO: 0 10*3/UL
NRBC BLD AUTO-RTO: 0 /100
PLATELET # BLD AUTO: 200 10E9/L (ref 150–450)
PROT SERPL-MCNC: 8 G/DL (ref 6.8–8.8)
RBC # BLD AUTO: 3.87 10E12/L (ref 3.8–5.2)
TIBC SERPL-MCNC: 200 UG/DL (ref 240–430)
TIBC SERPL-MCNC: NORMAL UG/DL (ref 240–430)
VIT B12 SERPL-MCNC: 419 PG/ML (ref 193–986)
VIT B12 SERPL-MCNC: NORMAL PG/ML (ref 193–986)
WBC # BLD AUTO: 4.5 10E9/L (ref 4–11)

## 2019-05-31 PROCEDURE — 96365 THER/PROPH/DIAG IV INF INIT: CPT

## 2019-05-31 PROCEDURE — 83550 IRON BINDING TEST: CPT | Performed by: INTERNAL MEDICINE

## 2019-05-31 PROCEDURE — 86140 C-REACTIVE PROTEIN: CPT | Performed by: INTERNAL MEDICINE

## 2019-05-31 PROCEDURE — 82746 ASSAY OF FOLIC ACID SERUM: CPT | Performed by: INTERNAL MEDICINE

## 2019-05-31 PROCEDURE — 25800030 ZZH RX IP 258 OP 636: Performed by: INTERNAL MEDICINE

## 2019-05-31 PROCEDURE — 80076 HEPATIC FUNCTION PANEL: CPT | Performed by: INTERNAL MEDICINE

## 2019-05-31 PROCEDURE — 82728 ASSAY OF FERRITIN: CPT | Performed by: INTERNAL MEDICINE

## 2019-05-31 PROCEDURE — 85652 RBC SED RATE AUTOMATED: CPT | Performed by: INTERNAL MEDICINE

## 2019-05-31 PROCEDURE — 85025 COMPLETE CBC W/AUTO DIFF WBC: CPT | Performed by: INTERNAL MEDICINE

## 2019-05-31 PROCEDURE — 25000128 H RX IP 250 OP 636: Performed by: INTERNAL MEDICINE

## 2019-05-31 PROCEDURE — 82607 VITAMIN B-12: CPT | Performed by: INTERNAL MEDICINE

## 2019-05-31 PROCEDURE — 83540 ASSAY OF IRON: CPT | Performed by: INTERNAL MEDICINE

## 2019-05-31 RX ADMIN — VEDOLIZUMAB 300 MG: 300 INJECTION, POWDER, LYOPHILIZED, FOR SOLUTION INTRAVENOUS at 08:30

## 2019-05-31 RX ADMIN — SODIUM CHLORIDE 250 ML: 9 INJECTION, SOLUTION INTRAVENOUS at 08:30

## 2019-05-31 NOTE — PROGRESS NOTES
Contacted pt - will come to Infusion right away to get redrawn. New orders re written for hemolyzed labs.  In process.

## 2019-05-31 NOTE — PROGRESS NOTES
Infusion Nursing Note:  Natalie Villa presents today for Entyvio.    Patient seen by provider today: No   present during visit today: Not Applicable.    Note: N/A.    Intravenous Access:  Lab draw site R AC, Needle type piv , Gauge 24.  Labs drawn without difficulty.  Peripheral IV placed.    Treatment Conditions:  Biological Infusion Checklist:  ~~~ NOTE: If the patient answers yes to any of the questions below, hold the infusion and contact ordering provider or on-call provider.    1. Have you recently had an elevated temperature, fever, chills, productive cough, coughing for 3 weeks or longer or hemoptysis, abnormal vital signs, night sweats,  chest pain or have you noticed a decrease in your appetite, unexplained weight loss or fatigue? No  2. Do you have any open wounds or new incisions? No  3. Do you have any recent or upcoming hospitalizations, surgeries or dental procedures? No  4. Do you currently have or recently have had any signs of illness or infection or are you on any antibiotics? No  5. Have you had any new, sudden or worsening abdominal pain? No  6. Have you or anyone in your household received a live vaccination in the past 4 weeks? Please note:  No live vaccines while on biologic/chemotherapy until 6 months after the last treatment.  Patient can receive the flu vaccine (shot only) and the pneumovax.  It is optimal for the patient to get these vaccines mid cycle, but they can be given at any time as long as it is not on the day of the infusion. No  7. Have you recently been diagnosed with any new nervous system diseases (ie. Multiple sclerosis, Guillain [place], seizures, neurological changes) or cancer diagnosis? No  8. Are you on any form of radiation or chemotherapy? No  9. Are you pregnant or breast feeding or do you have plans of pregnancy in the future? No  10. Have you been having any signs of worsening depression or suicidal ideations?  (benlysta only) No  11. Have there been  any other new onset medical symptoms? No        Post Infusion Assessment:  Patient tolerated infusion without incident.       Discharge Plan:   Discharge instructions reviewed with: Patient.  AVS to patient via MYCHART.  Patient will schedule more appointments  Patient discharged in stable condition accompanied by: self.  Departure Mode: Ambulatory.    Irma Jarrett RN

## 2019-05-31 NOTE — PROGRESS NOTES
"Lab returned call to Infusion.  TIBC, vit B 12, Ferritin tube hemolyzed.  RN attempted to call pt @ [phone], no answer; \"mailbox full\". Will try to contact again later today  "

## 2019-06-01 LAB — PAP SMEAR - HIM PATIENT REPORTED: NEGATIVE

## 2019-06-21 NOTE — NURSING NOTE
"Chief Complaint   Patient presents with     RECHECK     Return IBD       Vitals:    01/15/19 1415   BP: 107/67   Pulse: 85   Temp: 98.4  F (36.9  C)   TempSrc: Oral   SpO2: 96%   Weight: 79.2 kg (174 lb 8 oz)   Height: 1.664 m (5' 5.5\")       Body mass index is 28.6 kg/m .  RYAN Monge                          " Private Vehicle

## 2019-06-28 ENCOUNTER — INFUSION THERAPY VISIT (OUTPATIENT)
Dept: INFUSION THERAPY | Facility: CLINIC | Age: 29
End: 2019-06-28
Attending: INTERNAL MEDICINE
Payer: COMMERCIAL

## 2019-06-28 ENCOUNTER — HOSPITAL ENCOUNTER (OUTPATIENT)
Facility: CLINIC | Age: 29
Setting detail: SPECIMEN
End: 2019-06-28
Attending: INTERNAL MEDICINE
Payer: COMMERCIAL

## 2019-06-28 VITALS
RESPIRATION RATE: 16 BRPM | SYSTOLIC BLOOD PRESSURE: 110 MMHG | TEMPERATURE: 96.8 F | OXYGEN SATURATION: 98 % | DIASTOLIC BLOOD PRESSURE: 75 MMHG | HEART RATE: 59 BPM

## 2019-06-28 DIAGNOSIS — K50.10 CROHN'S DISEASE OF LARGE INTESTINE WITHOUT COMPLICATION (H): Primary | ICD-10-CM

## 2019-06-28 PROCEDURE — 96365 THER/PROPH/DIAG IV INF INIT: CPT

## 2019-06-28 PROCEDURE — 25000128 H RX IP 250 OP 636: Performed by: INTERNAL MEDICINE

## 2019-06-28 PROCEDURE — 25800030 ZZH RX IP 258 OP 636: Performed by: INTERNAL MEDICINE

## 2019-06-28 RX ADMIN — VEDOLIZUMAB 300 MG: 300 INJECTION, POWDER, LYOPHILIZED, FOR SOLUTION INTRAVENOUS at 08:26

## 2019-06-28 NOTE — PATIENT INSTRUCTIONS
Biologic Infusion Post Education: Call the triage nurse at your clinic or seek medical attention if you have chills and/or temperature greater than or equal to 100.5, uncontrolled nausea/vomiting, diarrhea, constipation, dizziness, shortness of breath, chest pain, heart palpitations, weakness or any other new or concerning symptoms, questions or concerns.  You cannot have any live virus vaccines prior to or during treatment or up to 6 months post infusion.  If you have an upcoming surgery, medical procedure or dental procedure during treatment, this should be discussed with your ordering physician and your surgeon/dentist.  If you are having any concerning symptom, if you are unsure if you should get your next infusion or wish to speak to a provider before your next infusion, please call your care coordinator or triage nurse at your clinic to notify them so we can adequately serve you.

## 2019-06-28 NOTE — PROGRESS NOTES
Infusion Nursing Note:  Natalie Villa presents today for Entyvio.    Patient seen by provider today: No   present during visit today: Not Applicable.    Note: pt aware we need new orders for labs and Entyvio before next appt.  Pt has sent a message in [employer] to her MD. This RN will inbasket Dr Toth as well.     Intravenous Access:  Peripheral IV placed.    Treatment Conditions:  Biological Infusion Checklist:  ~~~ NOTE: If the patient answers yes to any of the questions below, hold the infusion and contact ordering provider or on-call provider.    1. Have you recently had an elevated temperature, fever, chills, productive cough, coughing for 3 weeks or longer or hemoptysis, abnormal vital signs, night sweats,  chest pain or have you noticed a decrease in your appetite, unexplained weight loss or fatigue? No  2. Do you have any open wounds or new incisions? No  3. Do you have any recent or upcoming hospitalizations, surgeries or dental procedures? No  4. Do you currently have or recently have had any signs of illness or infection or are you on any antibiotics? No  5. Have you had any new, sudden or worsening abdominal pain? No  6. Have you or anyone in your household received a live vaccination in the past 4 weeks? Please note:  No live vaccines while on biologic/chemotherapy until 6 months after the last treatment.  Patient can receive the flu vaccine (shot only) and the pneumovax.  It is optimal for the patient to get these vaccines mid cycle, but they can be given at any time as long as it is not on the day of the infusion. No  7. Have you recently been diagnosed with any new nervous system diseases (ie. Multiple sclerosis, Guillain [place], seizures, neurological changes) or cancer diagnosis? No  8. Are you on any form of radiation or chemotherapy? No  9. Are you pregnant or breast feeding or do you have plans of pregnancy in the future? No  10. Have you been having any signs of worsening  depression or suicidal ideations?  (benlysta only) No  11. Have there been any other new onset medical symptoms? No        Post Infusion Assessment:  Patient tolerated infusion without incident.  Blood return noted pre and post infusion.  Site patent and intact, free from redness, edema or discomfort.  No evidence of extravasations.  Access discontinued per protocol.  Biologic Infusion Post Education: Call the triage nurse at your clinic or seek medical attention if you have chills and/or temperature greater than or equal to 100.5, uncontrolled nausea/vomiting, diarrhea, constipation, dizziness, shortness of breath, chest pain, heart palpitations, weakness or any other new or concerning symptoms, questions or concerns.  You cannot have any live virus vaccines prior to or during treatment or up to 6 months post infusion.  If you have an upcoming surgery, medical procedure or dental procedure during treatment, this should be discussed with your ordering physician and your surgeon/dentist.  If you are having any concerning symptom, if you are unsure if you should get your next infusion or wish to speak to a provider before your next infusion, please call your care coordinator or triage nurse at your clinic to notify them so we can adequately serve you.       Discharge Plan:   Discharge instructions reviewed with: Patient.  Patient and/or family verbalized understanding of discharge instructions and all questions answered.  AVS to patient via [employer].  Patient will return in 4 weeks for next appointment.   Patient discharged in stable condition accompanied by: self.  Departure Mode: Ambulatory.    Irma Jarrett RN

## 2019-07-26 ENCOUNTER — HOSPITAL ENCOUNTER (OUTPATIENT)
Facility: CLINIC | Age: 29
Setting detail: SPECIMEN
Discharge: HOME | End: 2019-07-26
Attending: INTERNAL MEDICINE
Payer: COMMERCIAL

## 2019-07-26 ENCOUNTER — INFUSION THERAPY VISIT (OUTPATIENT)
Dept: INFUSION THERAPY | Facility: CLINIC | Age: 29
End: 2019-07-26
Attending: INTERNAL MEDICINE
Payer: COMMERCIAL

## 2019-07-26 VITALS
RESPIRATION RATE: 16 BRPM | OXYGEN SATURATION: 98 % | TEMPERATURE: 97.8 F | HEART RATE: 68 BPM | SYSTOLIC BLOOD PRESSURE: 106 MMHG | DIASTOLIC BLOOD PRESSURE: 72 MMHG

## 2019-07-26 DIAGNOSIS — K50.10 CROHN'S DISEASE OF LARGE INTESTINE WITHOUT COMPLICATION (H): Primary | ICD-10-CM

## 2019-07-26 LAB
ALBUMIN SERPL-MCNC: 3.9 G/DL (ref 3.4–5)
ALP SERPL-CCNC: 61 U/L (ref 40–150)
ALT SERPL W P-5'-P-CCNC: 15 U/L (ref 0–50)
AST SERPL W P-5'-P-CCNC: 15 U/L (ref 0–45)
BASOPHILS # BLD AUTO: 0 10E9/L (ref 0–0.2)
BASOPHILS NFR BLD AUTO: 0.9 %
BILIRUB DIRECT SERPL-MCNC: 0.2 MG/DL (ref 0–0.2)
BILIRUB SERPL-MCNC: 1 MG/DL (ref 0.2–1.3)
CRP SERPL-MCNC: <2.9 MG/L (ref 0–8)
DIFFERENTIAL METHOD BLD: ABNORMAL
EOSINOPHIL # BLD AUTO: 0.3 10E9/L (ref 0–0.7)
EOSINOPHIL NFR BLD AUTO: 7.4 %
ERYTHROCYTE [DISTWIDTH] IN BLOOD BY AUTOMATED COUNT: 12.5 % (ref 10–15)
ERYTHROCYTE [SEDIMENTATION RATE] IN BLOOD BY WESTERGREN METHOD: 7 MM/H (ref 0–20)
HCT VFR BLD AUTO: 39.6 % (ref 35–47)
HGB BLD-MCNC: 12.7 G/DL (ref 11.7–15.7)
IMM GRANULOCYTES # BLD: 0 10E9/L (ref 0–0.4)
IMM GRANULOCYTES NFR BLD: 0.7 %
LYMPHOCYTES # BLD AUTO: 1.1 10E9/L (ref 0.8–5.3)
LYMPHOCYTES NFR BLD AUTO: 24.5 %
MCH RBC QN AUTO: 32.4 PG (ref 26.5–33)
MCHC RBC AUTO-ENTMCNC: 32.1 G/DL (ref 31.5–36.5)
MCV RBC AUTO: 101 FL (ref 78–100)
MONOCYTES # BLD AUTO: 0.4 10E9/L (ref 0–1.3)
MONOCYTES NFR BLD AUTO: 7.6 %
NEUTROPHILS # BLD AUTO: 2.7 10E9/L (ref 1.6–8.3)
NEUTROPHILS NFR BLD AUTO: 58.9 %
NRBC # BLD AUTO: 0 10*3/UL
NRBC BLD AUTO-RTO: 0 /100
PLATELET # BLD AUTO: 248 10E9/L (ref 150–450)
PROT SERPL-MCNC: 8.1 G/DL (ref 6.8–8.8)
RBC # BLD AUTO: 3.92 10E12/L (ref 3.8–5.2)
WBC # BLD AUTO: 4.6 10E9/L (ref 4–11)

## 2019-07-26 PROCEDURE — 85025 COMPLETE CBC W/AUTO DIFF WBC: CPT

## 2019-07-26 PROCEDURE — 25800030 ZZH RX IP 258 OP 636: Performed by: INTERNAL MEDICINE

## 2019-07-26 PROCEDURE — 86140 C-REACTIVE PROTEIN: CPT

## 2019-07-26 PROCEDURE — 80076 HEPATIC FUNCTION PANEL: CPT

## 2019-07-26 PROCEDURE — 85652 RBC SED RATE AUTOMATED: CPT

## 2019-07-26 PROCEDURE — 25000128 H RX IP 250 OP 636: Performed by: INTERNAL MEDICINE

## 2019-07-26 PROCEDURE — 36415 COLL VENOUS BLD VENIPUNCTURE: CPT

## 2019-07-26 PROCEDURE — 96365 THER/PROPH/DIAG IV INF INIT: CPT

## 2019-07-26 RX ADMIN — VEDOLIZUMAB 300 MG: 300 INJECTION, POWDER, LYOPHILIZED, FOR SOLUTION INTRAVENOUS at 09:07

## 2019-07-26 NOTE — PROGRESS NOTES
Infusion Nursing Note:  Natalie Villa presents today for Entyvio.    Patient seen by provider today: No   present during visit today: Not Applicable.    Note: Patient feels Entyvio is working well.    Intravenous Access:  Lab draw site RAC, Needle type angiocath, Gauge 22.  Labs drawn without difficulty.  Peripheral IV placed.    Treatment Conditions:  Biological Infusion Checklist:  ~~~ NOTE: If the patient answers yes to any of the questions below, hold the infusion and contact ordering provider or on-call provider.    1. Have you recently had an elevated temperature, fever, chills, productive cough, coughing for 3 weeks or longer or hemoptysis, abnormal vital signs, night sweats,  chest pain or have you noticed a decrease in your appetite, unexplained weight loss or fatigue? No  2. Do you have any open wounds or new incisions? No  3. Do you have any recent or upcoming hospitalizations, surgeries or dental procedures? No  4. Do you currently have or recently have had any signs of illness or infection or are you on any antibiotics? No  5. Have you had any new, sudden or worsening abdominal pain? No  6. Have you or anyone in your household received a live vaccination in the past 4 weeks? Please note:  No live vaccines while on biologic/chemotherapy until 6 months after the last treatment.  Patient can receive the flu vaccine (shot only) and the pneumovax.  It is optimal for the patient to get these vaccines mid cycle, but they can be given at any time as long as it is not on the day of the infusion. No  7. Have you recently been diagnosed with any new nervous system diseases (ie. Multiple sclerosis, Guillain [place], seizures, neurological changes) or cancer diagnosis? No  8. Are you on any form of radiation or chemotherapy? No  9. Are you pregnant or breast feeding or do you have plans of pregnancy in the future? No  10. Have you been having any signs of worsening depression or suicidal ideations?   (benlysta only) No  11. Have there been any other new onset medical symptoms? No        Post Infusion Assessment:  Patient tolerated infusion without incident.  Blood return noted pre and post infusion.  Site patent and intact, free from redness, edema or discomfort.  No evidence of extravasations.  Access discontinued per protocol.  Biologic Infusion Post Education: Call the triage nurse at your clinic or seek medical attention if you have chills and/or temperature greater than or equal to 100.5, uncontrolled nausea/vomiting, diarrhea, constipation, dizziness, shortness of breath, chest pain, heart palpitations, weakness or any other new or concerning symptoms, questions or concerns.  You cannot have any live virus vaccines prior to or during treatment or up to 6 months post infusion.  If you have an upcoming surgery, medical procedure or dental procedure during treatment, this should be discussed with your ordering physician and your surgeon/dentist.  If you are having any concerning symptom, if you are unsure if you should get your next infusion or wish to speak to a provider before your next infusion, please call your care coordinator or triage nurse at your clinic to notify them so we can adequately serve you.       Discharge Plan:   Patient declined prescription refills.  Copy of AVS reviewed with patient and/or family.  Patient will return 8/23 for next appointment.  Patient discharged in stable condition accompanied by: self.  Departure Mode: Ambulatory.    Laverne Og RN

## 2019-08-09 ENCOUNTER — OFFICE VISIT (OUTPATIENT)
Dept: URGENT CARE | Facility: URGENT CARE | Age: 29
End: 2019-08-09
Payer: COMMERCIAL

## 2019-08-09 VITALS
TEMPERATURE: 98.1 F | SYSTOLIC BLOOD PRESSURE: 112 MMHG | RESPIRATION RATE: 12 BRPM | DIASTOLIC BLOOD PRESSURE: 70 MMHG | BODY MASS INDEX: 28.28 KG/M2 | HEART RATE: 76 BPM | OXYGEN SATURATION: 99 % | HEIGHT: 66 IN | WEIGHT: 176 LBS

## 2019-08-09 DIAGNOSIS — J02.9 VIRAL PHARYNGITIS: Primary | ICD-10-CM

## 2019-08-09 DIAGNOSIS — R07.0 THROAT PAIN: ICD-10-CM

## 2019-08-09 LAB
DEPRECATED S PYO AG THROAT QL EIA: NORMAL
SPECIMEN SOURCE: NORMAL

## 2019-08-09 PROCEDURE — 99213 OFFICE O/P EST LOW 20 MIN: CPT | Performed by: FAMILY MEDICINE

## 2019-08-09 PROCEDURE — 87081 CULTURE SCREEN ONLY: CPT | Performed by: FAMILY MEDICINE

## 2019-08-09 PROCEDURE — 87880 STREP A ASSAY W/OPTIC: CPT | Performed by: FAMILY MEDICINE

## 2019-08-09 RX ORDER — PREDNISONE 10 MG/1
10 TABLET ORAL DAILY
Qty: 2 TABLET | Refills: 0 | Status: SHIPPED | OUTPATIENT
Start: 2019-08-09 | End: 2019-08-23

## 2019-08-09 RX ORDER — ACETAMINOPHEN 325 MG/1
325-650 TABLET ORAL EVERY 6 HOURS PRN
COMMUNITY

## 2019-08-09 ASSESSMENT — PAIN SCALES - GENERAL: PAINLEVEL: MODERATE PAIN (5)

## 2019-08-09 ASSESSMENT — MIFFLIN-ST. JEOR: SCORE: 1540.08

## 2019-08-09 NOTE — PATIENT INSTRUCTIONS
Prednisone 10mg a day for 2 days    Continue the spray. Cepacol lozenges    Tylenol every 4-6 hours as needed for pain    Honey lozenges      Expect improvement over the next 3-5 days, please reach out to us if your symptoms get worse    We will call you if the strep culture returns positive

## 2019-08-10 ENCOUNTER — NURSE TRIAGE (OUTPATIENT)
Dept: NURSING | Facility: CLINIC | Age: 29
End: 2019-08-10

## 2019-08-10 ENCOUNTER — TELEPHONE (OUTPATIENT)
Dept: URGENT CARE | Facility: URGENT CARE | Age: 29
End: 2019-08-10

## 2019-08-10 DIAGNOSIS — R07.0 THROAT PAIN: Primary | ICD-10-CM

## 2019-08-10 LAB
BACTERIA SPEC CULT: NORMAL
SPECIMEN SOURCE: NORMAL

## 2019-08-10 RX ORDER — AMOXICILLIN 500 MG/1
500 CAPSULE ORAL 3 TIMES DAILY
Qty: 30 CAPSULE | Refills: 0
Start: 2019-08-10 | End: 2019-08-23

## 2019-08-10 NOTE — TELEPHONE ENCOUNTER
Clinic Action Needed:  Yes, callback  FNA Triage Call  Presenting Problem:    Natalie was into [facility] yesterday and was told to use cough drops.  Today is calling and states that she has a sore throat that is worse.  Natalie has tried gargling with salt water and using chloraseptic and also entered humidity into home.  Natalie needs to travel for her job next week and is requesting medication for her sore throat.  When Natalie swallow or eats it is painful.  Please phone Natalie at [phone].      Routed to:  RN Pool    Please be sure to close this encounter once this patient's issue/question has been addressed.    Selena Ashley RN/[facility]

## 2019-08-10 NOTE — TELEPHONE ENCOUNTER
Natalie was into [facility] yesterday and was told to use cough drops.  Today is calling and states that she has a sore throat that is worse.  Natalie has tried gargling with salt water and using chloraseptic and also entered humidity into home.  Natalie needs to travel for her job next week and is requesting medication for her sore throat.  When Natalie swallow or eats it is painful.  Please phone Natalie at [phone].

## 2019-08-10 NOTE — TELEPHONE ENCOUNTER
I connected the pharmacist to the MD via phone to clarify the magic mouthwash order.  Roxy Millan RN-BC  [facility]

## 2019-08-10 NOTE — TELEPHONE ENCOUNTER
Per Dr Powell call pt and advise he will send a script for amoxicillin and magic mouth wash to her pharmacy. Pt advise.  Odell HERRERA

## 2019-08-23 ENCOUNTER — INFUSION THERAPY VISIT (OUTPATIENT)
Dept: INFUSION THERAPY | Facility: CLINIC | Age: 29
End: 2019-08-23
Attending: INTERNAL MEDICINE
Payer: COMMERCIAL

## 2019-08-23 ENCOUNTER — HOSPITAL ENCOUNTER (OUTPATIENT)
Facility: CLINIC | Age: 29
Setting detail: SPECIMEN
End: 2019-08-23
Attending: INTERNAL MEDICINE
Payer: COMMERCIAL

## 2019-08-23 VITALS
OXYGEN SATURATION: 99 % | TEMPERATURE: 97.1 F | DIASTOLIC BLOOD PRESSURE: 61 MMHG | RESPIRATION RATE: 16 BRPM | SYSTOLIC BLOOD PRESSURE: 113 MMHG | HEART RATE: 62 BPM

## 2019-08-23 DIAGNOSIS — K50.10 CROHN'S DISEASE OF LARGE INTESTINE WITHOUT COMPLICATION (H): Primary | ICD-10-CM

## 2019-08-23 PROCEDURE — 96365 THER/PROPH/DIAG IV INF INIT: CPT

## 2019-08-23 PROCEDURE — 25800030 ZZH RX IP 258 OP 636: Performed by: INTERNAL MEDICINE

## 2019-08-23 PROCEDURE — 25000128 H RX IP 250 OP 636: Performed by: INTERNAL MEDICINE

## 2019-08-23 RX ADMIN — VEDOLIZUMAB 300 MG: 300 INJECTION, POWDER, LYOPHILIZED, FOR SOLUTION INTRAVENOUS at 09:28

## 2019-08-23 ASSESSMENT — PAIN SCALES - GENERAL: PAINLEVEL: NO PAIN (0)

## 2019-08-23 NOTE — PROGRESS NOTES
Infusion Nursing Note:  Natalie Villa presents today for Entyvio.    Patient seen by provider today: No   present during visit today: Not Applicable.    Note: Labs due with next month's infusion.    Per patient, she has been symptom-free since April.    Intravenous Access:  Peripheral IV placed.    Treatment Conditions:  Biological Infusion Checklist:  ~~~ NOTE: If the patient answers yes to any of the questions below, hold the infusion and contact ordering provider or on-call provider.    1. Have you recently had an elevated temperature, fever, chills, productive cough, coughing for 3 weeks or longer or hemoptysis, abnormal vital signs, night sweats,  chest pain or have you noticed a decrease in your appetite, unexplained weight loss or fatigue? No  2. Do you have any open wounds or new incisions? No  3. Do you have any recent or upcoming hospitalizations, surgeries or dental procedures? No  4. Do you currently have or recently have had any signs of illness or infection or are you on any antibiotics? No  5. Have you had any new, sudden or worsening abdominal pain? No  6. Have you or anyone in your household received a live vaccination in the past 4 weeks? Please note:  No live vaccines while on biologic/chemotherapy until 6 months after the last treatment.  Patient can receive the flu vaccine (shot only) and the pneumovax.  It is optimal for the patient to get these vaccines mid cycle, but they can be given at any time as long as it is not on the day of the infusion. No  7. Have you recently been diagnosed with any new nervous system diseases (ie. Multiple sclerosis, Guillain [place], seizures, neurological changes) or cancer diagnosis? No  8. Are you on any form of radiation or chemotherapy? No  9. Are you pregnant or breast feeding or do you have plans of pregnancy in the future? No  10. Have you been having any signs of worsening depression or suicidal ideations?  (benlysta only) No  11. Have there  been any other new onset medical symptoms? No  Post Infusion Assessment:  Patient tolerated infusion without incident.  Blood return noted pre and post infusion.  Site patent and intact, free from redness, edema or discomfort.  No evidence of extravasations.  Access discontinued per protocol.  Biologic Infusion Post Education: Call the triage nurse at your clinic or seek medical attention if you have chills and/or temperature greater than or equal to 100.5, uncontrolled nausea/vomiting, diarrhea, constipation, dizziness, shortness of breath, chest pain, heart palpitations, weakness or any other new or concerning symptoms, questions or concerns.  You cannot have any live virus vaccines prior to or during treatment or up to 6 months post infusion.  If you have an upcoming surgery, medical procedure or dental procedure during treatment, this should be discussed with your ordering physician and your surgeon/dentist.  If you are having any concerning symptom, if you are unsure if you should get your next infusion or wish to speak to a provider before your next infusion, please call your care coordinator or triage nurse at your clinic to notify them so we can adequately serve you.     Discharge Plan:   Discharge instructions reviewed with: Patient.  Patient and/or family verbalized understanding of discharge instructions and all questions answered.  AVS to patient via [email or web address].  Patient will return 9/20 for next Entyvio appointment.   Patient discharged in stable condition accompanied by: self.  Departure Mode: Ambulatory.    Maria Luisa Toure, RN, RN

## 2019-08-27 DIAGNOSIS — K50.10 CROHN'S DISEASE OF LARGE INTESTINE WITHOUT COMPLICATION (H): Primary | ICD-10-CM

## 2019-08-27 NOTE — PROGRESS NOTES
Patient therapy plan updated due to plan expiration. Patient continues on medication per last clinic encounter.   Patient standing lab orders also placed.

## 2019-09-20 ENCOUNTER — INFUSION THERAPY VISIT (OUTPATIENT)
Dept: INFUSION THERAPY | Facility: CLINIC | Age: 29
End: 2019-09-20
Attending: INTERNAL MEDICINE
Payer: COMMERCIAL

## 2019-09-20 ENCOUNTER — HOSPITAL ENCOUNTER (OUTPATIENT)
Facility: CLINIC | Age: 29
Setting detail: SPECIMEN
Discharge: HOME | End: 2019-09-20
Attending: INTERNAL MEDICINE | Admitting: INTERNAL MEDICINE
Payer: COMMERCIAL

## 2019-09-20 VITALS
SYSTOLIC BLOOD PRESSURE: 109 MMHG | TEMPERATURE: 97.9 F | DIASTOLIC BLOOD PRESSURE: 52 MMHG | OXYGEN SATURATION: 99 % | RESPIRATION RATE: 16 BRPM | HEART RATE: 82 BPM

## 2019-09-20 DIAGNOSIS — K50.10 CROHN'S DISEASE OF LARGE INTESTINE WITHOUT COMPLICATION (H): Primary | ICD-10-CM

## 2019-09-20 LAB
ALBUMIN SERPL-MCNC: 4.2 G/DL (ref 3.4–5)
ALP SERPL-CCNC: 62 U/L (ref 40–150)
ALT SERPL W P-5'-P-CCNC: 14 U/L (ref 0–50)
AST SERPL W P-5'-P-CCNC: 20 U/L (ref 0–45)
BASOPHILS # BLD AUTO: 0 10E9/L (ref 0–0.2)
BASOPHILS NFR BLD AUTO: 0.8 %
BILIRUB DIRECT SERPL-MCNC: 0.2 MG/DL (ref 0–0.2)
BILIRUB SERPL-MCNC: 1.1 MG/DL (ref 0.2–1.3)
CRP SERPL-MCNC: <2.9 MG/L (ref 0–8)
DIFFERENTIAL METHOD BLD: NORMAL
EOSINOPHIL # BLD AUTO: 0.4 10E9/L (ref 0–0.7)
EOSINOPHIL NFR BLD AUTO: 8.4 %
ERYTHROCYTE [DISTWIDTH] IN BLOOD BY AUTOMATED COUNT: 12.1 % (ref 10–15)
ERYTHROCYTE [SEDIMENTATION RATE] IN BLOOD BY WESTERGREN METHOD: 7 MM/H (ref 0–20)
HCT VFR BLD AUTO: 41 % (ref 35–47)
HGB BLD-MCNC: 13.4 G/DL (ref 11.7–15.7)
IMM GRANULOCYTES # BLD: 0 10E9/L (ref 0–0.4)
IMM GRANULOCYTES NFR BLD: 0.6 %
LYMPHOCYTES # BLD AUTO: 1.3 10E9/L (ref 0.8–5.3)
LYMPHOCYTES NFR BLD AUTO: 25.1 %
MCH RBC QN AUTO: 32.8 PG (ref 26.5–33)
MCHC RBC AUTO-ENTMCNC: 32.7 G/DL (ref 31.5–36.5)
MCV RBC AUTO: 100 FL (ref 78–100)
MONOCYTES # BLD AUTO: 0.4 10E9/L (ref 0–1.3)
MONOCYTES NFR BLD AUTO: 7.1 %
NEUTROPHILS # BLD AUTO: 3 10E9/L (ref 1.6–8.3)
NEUTROPHILS NFR BLD AUTO: 58 %
NRBC # BLD AUTO: 0 10*3/UL
NRBC BLD AUTO-RTO: 0 /100
PLATELET # BLD AUTO: 241 10E9/L (ref 150–450)
PROT SERPL-MCNC: 8.5 G/DL (ref 6.8–8.8)
RBC # BLD AUTO: 4.09 10E12/L (ref 3.8–5.2)
WBC # BLD AUTO: 5.1 10E9/L (ref 4–11)

## 2019-09-20 PROCEDURE — 25800030 ZZH RX IP 258 OP 636: Performed by: INTERNAL MEDICINE

## 2019-09-20 PROCEDURE — 25000128 H RX IP 250 OP 636: Performed by: INTERNAL MEDICINE

## 2019-09-20 PROCEDURE — 36415 COLL VENOUS BLD VENIPUNCTURE: CPT

## 2019-09-20 PROCEDURE — 85652 RBC SED RATE AUTOMATED: CPT | Performed by: INTERNAL MEDICINE

## 2019-09-20 PROCEDURE — 86140 C-REACTIVE PROTEIN: CPT | Performed by: INTERNAL MEDICINE

## 2019-09-20 PROCEDURE — 85025 COMPLETE CBC W/AUTO DIFF WBC: CPT | Performed by: INTERNAL MEDICINE

## 2019-09-20 PROCEDURE — 96365 THER/PROPH/DIAG IV INF INIT: CPT

## 2019-09-20 PROCEDURE — 80076 HEPATIC FUNCTION PANEL: CPT | Performed by: INTERNAL MEDICINE

## 2019-09-20 RX ADMIN — VEDOLIZUMAB 300 MG: 300 INJECTION, POWDER, LYOPHILIZED, FOR SOLUTION INTRAVENOUS at 08:21

## 2019-09-20 NOTE — PROGRESS NOTES
Infusion Nursing Note:  Natalie Villa presents today for Entyvio and labs.    Patient seen by provider today: No   present during visit today: Not Applicable.    Note: N/A.    Intravenous Access:  Lab draw site right lower forearm, Needle type IV, Gauge 22.  Labs drawn without difficulty.  Peripheral IV placed.    Treatment Conditions:  Results reviewed, labs MET treatment parameters, ok to proceed with treatment.  Biological Infusion Checklist:  ~~~ NOTE: If the patient answers yes to any of the questions below, hold the infusion and contact ordering provider or on-call provider.    1. Have you recently had an elevated temperature, fever, chills, productive cough, coughing for 3 weeks or longer or hemoptysis, abnormal vital signs, night sweats,  chest pain or have you noticed a decrease in your appetite, unexplained weight loss or fatigue? No  2. Do you have any open wounds or new incisions? No  3. Do you have any recent or upcoming hospitalizations, surgeries or dental procedures? No  4. Do you currently have or recently have had any signs of illness or infection or are you on any antibiotics? No  5. Have you had any new, sudden or worsening abdominal pain? No  6. Have you or anyone in your household received a live vaccination in the past 4 weeks? Please note:  No live vaccines while on biologic/chemotherapy until 6 months after the last treatment.  Patient can receive the flu vaccine (shot only) and the pneumovax.  It is optimal for the patient to get these vaccines mid cycle, but they can be given at any time as long as it is not on the day of the infusion. No  7. Have you recently been diagnosed with any new nervous system diseases (ie. Multiple sclerosis, Guillain [place], seizures, neurological changes) or cancer diagnosis? No  8. Are you on any form of radiation or chemotherapy? No  9. Are you pregnant or breast feeding or do you have plans of pregnancy in the future? No  10. Have you been  having any signs of worsening depression or suicidal ideations?  (benlysta only) No  11. Have there been any other new onset medical symptoms? No        Post Infusion Assessment:  Patient tolerated infusion without incident.  Blood return noted pre and post infusion.  Site patent and intact, free from redness, edema or discomfort.  No evidence of extravasations.  Access discontinued per protocol.  Biologic Infusion Post Education: Call the triage nurse at your clinic or seek medical attention if you have chills and/or temperature greater than or equal to 100.5, uncontrolled nausea/vomiting, diarrhea, constipation, dizziness, shortness of breath, chest pain, heart palpitations, weakness or any other new or concerning symptoms, questions or concerns.  You cannot have any live virus vaccines prior to or during treatment or up to 6 months post infusion.  If you have an upcoming surgery, medical procedure or dental procedure during treatment, this should be discussed with your ordering physician and your surgeon/dentist.  If you are having any concerning symptom, if you are unsure if you should get your next infusion or wish to speak to a provider before your next infusion, please call your care coordinator or triage nurse at your clinic to notify them so we can adequately serve you.       Discharge Plan:   Discharge instructions reviewed with: Patient.  Patient and/or family verbalized understanding of discharge instructions and all questions answered.  AVS to patient via [employer].  Patient will call for next appointment.   Patient discharged in stable condition accompanied by: self.  Departure Mode: Ambulatory.    Leila Márquez, RN, RN

## 2019-10-18 ENCOUNTER — INFUSION THERAPY VISIT (OUTPATIENT)
Dept: INFUSION THERAPY | Facility: CLINIC | Age: 29
End: 2019-10-18
Attending: INTERNAL MEDICINE
Payer: COMMERCIAL

## 2019-10-18 ENCOUNTER — HOSPITAL ENCOUNTER (OUTPATIENT)
Facility: CLINIC | Age: 29
Setting detail: SPECIMEN
Discharge: HOME | End: 2019-10-18
Attending: INTERNAL MEDICINE | Admitting: INTERNAL MEDICINE
Payer: COMMERCIAL

## 2019-10-18 VITALS
DIASTOLIC BLOOD PRESSURE: 70 MMHG | SYSTOLIC BLOOD PRESSURE: 110 MMHG | TEMPERATURE: 96.8 F | RESPIRATION RATE: 16 BRPM | OXYGEN SATURATION: 98 % | HEART RATE: 75 BPM

## 2019-10-18 DIAGNOSIS — K50.10 CROHN'S DISEASE OF LARGE INTESTINE WITHOUT COMPLICATION (H): Primary | ICD-10-CM

## 2019-10-18 LAB
ALBUMIN SERPL-MCNC: 3.8 G/DL (ref 3.4–5)
ALP SERPL-CCNC: 54 U/L (ref 40–150)
ALT SERPL W P-5'-P-CCNC: 11 U/L (ref 0–50)
AST SERPL W P-5'-P-CCNC: 9 U/L (ref 0–45)
BASOPHILS # BLD AUTO: 0 10E9/L (ref 0–0.2)
BASOPHILS NFR BLD AUTO: 0.7 %
BILIRUB DIRECT SERPL-MCNC: 0.1 MG/DL (ref 0–0.2)
BILIRUB SERPL-MCNC: 0.6 MG/DL (ref 0.2–1.3)
CRP SERPL-MCNC: 7.2 MG/L (ref 0–8)
DIFFERENTIAL METHOD BLD: NORMAL
EOSINOPHIL # BLD AUTO: 0.3 10E9/L (ref 0–0.7)
EOSINOPHIL NFR BLD AUTO: 5.2 %
ERYTHROCYTE [DISTWIDTH] IN BLOOD BY AUTOMATED COUNT: 11.6 % (ref 10–15)
ERYTHROCYTE [SEDIMENTATION RATE] IN BLOOD BY WESTERGREN METHOD: 14 MM/H (ref 0–20)
HCT VFR BLD AUTO: 38.4 % (ref 35–47)
HGB BLD-MCNC: 12.4 G/DL (ref 11.7–15.7)
IMM GRANULOCYTES # BLD: 0.1 10E9/L (ref 0–0.4)
IMM GRANULOCYTES NFR BLD: 0.8 %
LYMPHOCYTES # BLD AUTO: 1.4 10E9/L (ref 0.8–5.3)
LYMPHOCYTES NFR BLD AUTO: 23.6 %
MCH RBC QN AUTO: 31.4 PG (ref 26.5–33)
MCHC RBC AUTO-ENTMCNC: 32.3 G/DL (ref 31.5–36.5)
MCV RBC AUTO: 97 FL (ref 78–100)
MONOCYTES # BLD AUTO: 0.5 10E9/L (ref 0–1.3)
MONOCYTES NFR BLD AUTO: 8.3 %
NEUTROPHILS # BLD AUTO: 3.6 10E9/L (ref 1.6–8.3)
NEUTROPHILS NFR BLD AUTO: 61.4 %
NRBC # BLD AUTO: 0 10*3/UL
NRBC BLD AUTO-RTO: 0 /100
PLATELET # BLD AUTO: 300 10E9/L (ref 150–450)
PROT SERPL-MCNC: 8.1 G/DL (ref 6.8–8.8)
RBC # BLD AUTO: 3.95 10E12/L (ref 3.8–5.2)
WBC # BLD AUTO: 5.9 10E9/L (ref 4–11)

## 2019-10-18 PROCEDURE — 85025 COMPLETE CBC W/AUTO DIFF WBC: CPT | Performed by: INTERNAL MEDICINE

## 2019-10-18 PROCEDURE — 25800030 ZZH RX IP 258 OP 636: Performed by: INTERNAL MEDICINE

## 2019-10-18 PROCEDURE — 96365 THER/PROPH/DIAG IV INF INIT: CPT

## 2019-10-18 PROCEDURE — 36415 COLL VENOUS BLD VENIPUNCTURE: CPT

## 2019-10-18 PROCEDURE — 85652 RBC SED RATE AUTOMATED: CPT | Performed by: INTERNAL MEDICINE

## 2019-10-18 PROCEDURE — 80076 HEPATIC FUNCTION PANEL: CPT | Performed by: INTERNAL MEDICINE

## 2019-10-18 PROCEDURE — 86140 C-REACTIVE PROTEIN: CPT | Performed by: INTERNAL MEDICINE

## 2019-10-18 PROCEDURE — 25000128 H RX IP 250 OP 636: Performed by: INTERNAL MEDICINE

## 2019-10-18 RX ADMIN — VEDOLIZUMAB 300 MG: 300 INJECTION, POWDER, LYOPHILIZED, FOR SOLUTION INTRAVENOUS at 08:23

## 2019-10-18 NOTE — PROGRESS NOTES
Infusion Nursing Note:  Natalie Villa presents today for Entyvio.    Patient seen by provider today: No   present during visit today: Not Applicable.    Note: Pt complains of sinus congestion x 2 days. Denies any fever, chills, sore throat, or other symptoms at this time.    Intravenous Access:  Labs drawn without difficulty.  Peripheral IV placed.    Treatment Conditions:  Biological Infusion Checklist:  ~~~ NOTE: If the patient answers yes to any of the questions below, hold the infusion and contact ordering provider or on-call provider.    1. Have you recently had an elevated temperature, fever, chills, productive cough, coughing for 3 weeks or longer or hemoptysis, abnormal vital signs, night sweats,  chest pain or have you noticed a decrease in your appetite, unexplained weight loss or fatigue? No  2. Do you have any open wounds or new incisions? No  3. Do you have any recent or upcoming hospitalizations, surgeries or dental procedures? No  4. Do you currently have or recently have had any signs of illness or infection or are you on any antibiotics? No  5. Have you had any new, sudden or worsening abdominal pain? No  6. Have you or anyone in your household received a live vaccination in the past 4 weeks? Please note:  No live vaccines while on biologic/chemotherapy until 6 months after the last treatment.  Patient can receive the flu vaccine (shot only) and the pneumovax.  It is optimal for the patient to get these vaccines mid cycle, but they can be given at any time as long as it is not on the day of the infusion. No  7. Have you recently been diagnosed with any new nervous system diseases (ie. Multiple sclerosis, Guillain [place], seizures, neurological changes) or cancer diagnosis? No  8. Are you on any form of radiation or chemotherapy? No  9. Are you pregnant or breast feeding or do you have plans of pregnancy in the future? No  10. Have you been having any signs of worsening depression or  suicidal ideations?  (benlysta only) No  11. Have there been any other new onset medical symptoms? No        Post Infusion Assessment:  Patient tolerated infusion without incident.  Blood return noted pre and post infusion.  Site patent and intact, free from redness, edema or discomfort.  No evidence of extravasations.  Access discontinued per protocol.       Discharge Plan:   Discharge instructions reviewed with: Patient.  Patient and/or family verbalized understanding of discharge instructions and all questions answered.  Copy of AVS reviewed with patient and/or family.  Patient will return 11/15/19 for next appointment.  Patient discharged in stable condition accompanied by: self.  Departure Mode: Ambulatory.    Yesi Barry RN

## 2019-11-09 ENCOUNTER — HEALTH MAINTENANCE LETTER (OUTPATIENT)
Age: 29
End: 2019-11-09

## 2019-11-15 ENCOUNTER — HOSPITAL ENCOUNTER (OUTPATIENT)
Facility: CLINIC | Age: 29
Setting detail: SPECIMEN
Discharge: HOME | End: 2019-11-15
Attending: INTERNAL MEDICINE | Admitting: INTERNAL MEDICINE
Payer: COMMERCIAL

## 2019-11-15 ENCOUNTER — INFUSION THERAPY VISIT (OUTPATIENT)
Dept: INFUSION THERAPY | Facility: CLINIC | Age: 29
End: 2019-11-15
Attending: INTERNAL MEDICINE
Payer: COMMERCIAL

## 2019-11-15 VITALS
OXYGEN SATURATION: 97 % | HEART RATE: 75 BPM | TEMPERATURE: 97.3 F | SYSTOLIC BLOOD PRESSURE: 101 MMHG | RESPIRATION RATE: 16 BRPM | DIASTOLIC BLOOD PRESSURE: 67 MMHG | WEIGHT: 187 LBS | BODY MASS INDEX: 30.18 KG/M2

## 2019-11-15 DIAGNOSIS — K50.10 CROHN'S DISEASE OF LARGE INTESTINE WITHOUT COMPLICATION (H): Primary | ICD-10-CM

## 2019-11-15 LAB
ALBUMIN SERPL-MCNC: 3.8 G/DL (ref 3.4–5)
ALP SERPL-CCNC: 44 U/L (ref 40–150)
ALT SERPL W P-5'-P-CCNC: 13 U/L (ref 0–50)
AST SERPL W P-5'-P-CCNC: 34 U/L (ref 0–45)
BASOPHILS # BLD AUTO: 0 10E9/L (ref 0–0.2)
BASOPHILS NFR BLD AUTO: 0.7 %
BILIRUB DIRECT SERPL-MCNC: 0.2 MG/DL (ref 0–0.2)
BILIRUB SERPL-MCNC: 1.2 MG/DL (ref 0.2–1.3)
CRP SERPL-MCNC: <2.9 MG/L (ref 0–8)
DIFFERENTIAL METHOD BLD: NORMAL
EOSINOPHIL # BLD AUTO: 0.2 10E9/L (ref 0–0.7)
EOSINOPHIL NFR BLD AUTO: 5.9 %
ERYTHROCYTE [DISTWIDTH] IN BLOOD BY AUTOMATED COUNT: 11.9 % (ref 10–15)
ERYTHROCYTE [SEDIMENTATION RATE] IN BLOOD BY WESTERGREN METHOD: 7 MM/H (ref 0–20)
HCT VFR BLD AUTO: 39 % (ref 35–47)
HGB BLD-MCNC: 12.5 G/DL (ref 11.7–15.7)
IMM GRANULOCYTES # BLD: 0 10E9/L (ref 0–0.4)
IMM GRANULOCYTES NFR BLD: 0.5 %
LYMPHOCYTES # BLD AUTO: 1.2 10E9/L (ref 0.8–5.3)
LYMPHOCYTES NFR BLD AUTO: 29 %
MCH RBC QN AUTO: 31.7 PG (ref 26.5–33)
MCHC RBC AUTO-ENTMCNC: 32.1 G/DL (ref 31.5–36.5)
MCV RBC AUTO: 99 FL (ref 78–100)
MONOCYTES # BLD AUTO: 0.4 10E9/L (ref 0–1.3)
MONOCYTES NFR BLD AUTO: 9.3 %
NEUTROPHILS # BLD AUTO: 2.2 10E9/L (ref 1.6–8.3)
NEUTROPHILS NFR BLD AUTO: 54.6 %
NRBC # BLD AUTO: 0 10*3/UL
NRBC BLD AUTO-RTO: 0 /100
PLATELET # BLD AUTO: 233 10E9/L (ref 150–450)
PROT SERPL-MCNC: 8.2 G/DL (ref 6.8–8.8)
RBC # BLD AUTO: 3.94 10E12/L (ref 3.8–5.2)
WBC # BLD AUTO: 4.1 10E9/L (ref 4–11)

## 2019-11-15 PROCEDURE — 25800030 ZZH RX IP 258 OP 636: Performed by: INTERNAL MEDICINE

## 2019-11-15 PROCEDURE — 86140 C-REACTIVE PROTEIN: CPT | Performed by: INTERNAL MEDICINE

## 2019-11-15 PROCEDURE — 85025 COMPLETE CBC W/AUTO DIFF WBC: CPT | Performed by: INTERNAL MEDICINE

## 2019-11-15 PROCEDURE — 96365 THER/PROPH/DIAG IV INF INIT: CPT

## 2019-11-15 PROCEDURE — 36415 COLL VENOUS BLD VENIPUNCTURE: CPT

## 2019-11-15 PROCEDURE — 85652 RBC SED RATE AUTOMATED: CPT | Performed by: INTERNAL MEDICINE

## 2019-11-15 PROCEDURE — 80076 HEPATIC FUNCTION PANEL: CPT | Performed by: INTERNAL MEDICINE

## 2019-11-15 PROCEDURE — 25000128 H RX IP 250 OP 636: Performed by: INTERNAL MEDICINE

## 2019-11-15 RX ADMIN — VEDOLIZUMAB 300 MG: 300 INJECTION, POWDER, LYOPHILIZED, FOR SOLUTION INTRAVENOUS at 08:30

## 2019-11-15 NOTE — PROGRESS NOTES
Infusion Nursing Note:  Natalie Villa presents today for Entyvio.    Patient seen by provider today: No   present during visit today: Not Applicable.    Note: Patient has sinus congestions, but attributes this to moving to a new house that is very al. House is getting deep cleaning this week and she feels this will help. Denies fever, sore throat or cough.    Intravenous Access:  Lab draw site RAC, Needle type angiocath, Gauge 22.  Labs drawn without difficulty.  Peripheral IV placed.    Treatment Conditions:  Labs drawn.    Biological Infusion Checklist:  ~~~ NOTE: If the patient answers yes to any of the questions below, hold the infusion and contact ordering provider or on-call provider.    1. Have you recently had an elevated temperature, fever, chills, productive cough, coughing for 3 weeks or longer or hemoptysis, abnormal vital signs, night sweats,  chest pain or have you noticed a decrease in your appetite, unexplained weight loss or fatigue? No  2. Do you have any open wounds or new incisions? No  3. Do you have any recent or upcoming hospitalizations, surgeries or dental procedures? No  4. Do you currently have or recently have had any signs of illness or infection or are you on any antibiotics? No  5. Have you had any new, sudden or worsening abdominal pain? No  6. Have you or anyone in your household received a live vaccination in the past 4 weeks? Please note:  No live vaccines while on biologic/chemotherapy until 6 months after the last treatment.  Patient can receive the flu vaccine (shot only) and the pneumovax.  It is optimal for the patient to get these vaccines mid cycle, but they can be given at any time as long as it is not on the day of the infusion. No  7. Have you recently been diagnosed with any new nervous system diseases (ie. Multiple sclerosis, Guillain [place], seizures, neurological changes) or cancer diagnosis? No  8. Are you on any form of radiation or chemotherapy?  No  9. Are you pregnant or breast feeding or do you have plans of pregnancy in the future? No  10. Have you been having any signs of worsening depression or suicidal ideations?  (benlysta only) No  11. Have there been any other new onset medical symptoms? No          Post Infusion Assessment:  Patient tolerated infusion without incident.  Blood return noted pre and post infusion.  Site patent and intact, free from redness, edema or discomfort.  No evidence of extravasations.  Access discontinued per protocol.  Biologic Infusion Post Education: Call the triage nurse at your clinic or seek medical attention if you have chills and/or temperature greater than or equal to 100.5, uncontrolled nausea/vomiting, diarrhea, constipation, dizziness, shortness of breath, chest pain, heart palpitations, weakness or any other new or concerning symptoms, questions or concerns.  You cannot have any live virus vaccines prior to or during treatment or up to 6 months post infusion.  If you have an upcoming surgery, medical procedure or dental procedure during treatment, this should be discussed with your ordering physician and your surgeon/dentist.  If you are having any concerning symptom, if you are unsure if you should get your next infusion or wish to speak to a provider before your next infusion, please call your care coordinator or triage nurse at your clinic to notify them so we can adequately serve you.       Discharge Plan:   Patient declined prescription refills.  Copy of AVS reviewed with patient and/or family.  Patient will return 12/13 for next appointment.  Patient discharged in stable condition accompanied by: self.  Departure Mode: Ambulatory.    Laverne Og RN

## 2019-12-13 ENCOUNTER — HOSPITAL ENCOUNTER (OUTPATIENT)
Facility: CLINIC | Age: 29
Setting detail: SPECIMEN
End: 2019-12-13
Attending: INTERNAL MEDICINE
Payer: COMMERCIAL

## 2019-12-13 ENCOUNTER — INFUSION THERAPY VISIT (OUTPATIENT)
Dept: INFUSION THERAPY | Facility: CLINIC | Age: 29
End: 2019-12-13
Attending: INTERNAL MEDICINE
Payer: COMMERCIAL

## 2019-12-13 VITALS
OXYGEN SATURATION: 99 % | HEART RATE: 67 BPM | RESPIRATION RATE: 16 BRPM | TEMPERATURE: 97.3 F | SYSTOLIC BLOOD PRESSURE: 112 MMHG | DIASTOLIC BLOOD PRESSURE: 78 MMHG

## 2019-12-13 DIAGNOSIS — K50.10 CROHN'S DISEASE OF LARGE INTESTINE WITHOUT COMPLICATION (H): Primary | ICD-10-CM

## 2019-12-13 PROCEDURE — 96365 THER/PROPH/DIAG IV INF INIT: CPT

## 2019-12-13 PROCEDURE — 25800030 ZZH RX IP 258 OP 636: Performed by: INTERNAL MEDICINE

## 2019-12-13 PROCEDURE — 25000128 H RX IP 250 OP 636: Performed by: INTERNAL MEDICINE

## 2019-12-13 RX ADMIN — VEDOLIZUMAB 300 MG: 300 INJECTION, POWDER, LYOPHILIZED, FOR SOLUTION INTRAVENOUS at 08:23

## 2019-12-13 NOTE — PROGRESS NOTES
Infusion Nursing Note:  Natalie Villa presents today for entyvio.     present during visit today: Not Applicable.    Note: N/A.    Intravenous Access:  Peripheral IV placed.    Treatment Conditions:  Biological Infusion Checklist:  ~~~ NOTE: If the patient answers yes to any of the questions below, hold the infusion and contact ordering provider or on-call provider.    1. Have you recently had an elevated temperature, fever, chills, productive cough, coughing for 3 weeks or longer or hemoptysis, abnormal vital signs, night sweats,  chest pain or have you noticed a decrease in your appetite, unexplained weight loss or fatigue? No    2. Do you have any open wounds or new incisions? No  3. Do you have any recent or upcoming hospitalizations, surgeries or dental procedures? No  4. Do you currently have or recently have had any signs of illness or infection or are you on any antibiotics? No  5. Have you had any new, sudden or worsening abdominal pain? No  6. Have you or anyone in your household received a live vaccination in the past 4 weeks? Please note:  No live vaccines while on biologic/chemotherapy until 6 months after the last treatment.  Patient can receive the flu vaccine (shot only) and the pneumovax.  It is optimal for the patient to get these vaccines mid cycle, but they can be given at any time as long as it is not on the day of the infusion. No  7. Have you recently been diagnosed with any new nervous system diseases (ie. Multiple sclerosis, Guillain [place], seizures, neurological changes) or cancer diagnosis? No  8. Are you on any form of radiation or chemotherapy? No  9. Are you pregnant or breast feeding or do you have plans of pregnancy in the future? No  10. Have you been having any signs of worsening depression or suicidal ideations?  (benlysta only) No  11. Have there been any other new onset medical symptoms? No      Patient tolerated infusion   Blood return noted pre and post  infusion.  Site patent and intact, free from redness, edema or discomfort.  No evidence of extravasations.  Access (discontinued)    Post Infusion Assessment:  Biologic Infusion Post Education: Call the triage nurse at your clinic or seek medical attention if you have chills and/or temperature greater than or equal to 100.5, uncontrolled nausea/vomiting, diarrhea, constipation, dizziness, shortness of breath, chest pain, heart palpitations, weakness or any other new or concerning symptoms, questions or concerns.  You cannot have any live virus vaccines prior to or during treatment or up to 6 months post infusion.  If you have an upcoming surgery, medical procedure or dental procedure during treatment, this should be discussed with your ordering physician and your surgeon/dentist.  If you are having any concerning symptom, if you are unsure if you should get your next infusion or wish to speak to a provider before your next infusion, please call your care coordinator or triage nurse at your clinic to notify them so we can adequately serve you.       Discharge Plan:   Patient and/or family verbalized understanding of  instructions and all questions answered.  Patient  to lobby in stable condition accompanied by: self.  Patient to see provider today: No  Departure Mode: Ambulatory.  Irma Jarrett, RN, RN

## 2019-12-16 ASSESSMENT — ENCOUNTER SYMPTOMS
JOINT SWELLING: 0
PALPITATIONS: 0
COUGH: 0
CHILLS: 0
MYALGIAS: 0
SORE THROAT: 0
HEADACHES: 0
SHORTNESS OF BREATH: 0
DYSURIA: 0
WEAKNESS: 0
NAUSEA: 0
HEMATURIA: 0
FEVER: 0
NERVOUS/ANXIOUS: 0
HEARTBURN: 0
HEMATOCHEZIA: 0
EYE PAIN: 0
FREQUENCY: 0
CONSTIPATION: 0
ARTHRALGIAS: 0
DIZZINESS: 0
PARESTHESIAS: 0
DIARRHEA: 0
BREAST MASS: 0
ABDOMINAL PAIN: 0

## 2019-12-19 ENCOUNTER — OFFICE VISIT (OUTPATIENT)
Dept: FAMILY MEDICINE | Facility: CLINIC | Age: 29
End: 2019-12-19
Payer: COMMERCIAL

## 2019-12-19 VITALS
TEMPERATURE: 98.5 F | DIASTOLIC BLOOD PRESSURE: 62 MMHG | HEIGHT: 66 IN | HEART RATE: 61 BPM | RESPIRATION RATE: 21 BRPM | BODY MASS INDEX: 30.53 KG/M2 | OXYGEN SATURATION: 99 % | WEIGHT: 190 LBS | SYSTOLIC BLOOD PRESSURE: 104 MMHG

## 2019-12-19 DIAGNOSIS — J45.990 EXERCISE-INDUCED ASTHMA: ICD-10-CM

## 2019-12-19 DIAGNOSIS — Z23 NEED FOR IMMUNIZATION AGAINST INFLUENZA: ICD-10-CM

## 2019-12-19 DIAGNOSIS — Z11.3 SCREEN FOR STD (SEXUALLY TRANSMITTED DISEASE): ICD-10-CM

## 2019-12-19 DIAGNOSIS — Z00.00 ROUTINE GENERAL MEDICAL EXAMINATION AT A HEALTH CARE FACILITY: Primary | ICD-10-CM

## 2019-12-19 PROBLEM — E55.9 VITAMIN D DEFICIENCY: Status: ACTIVE | Noted: 2018-09-06

## 2019-12-19 PROBLEM — I82.4Z2 DEEP VEIN THROMBOSIS (DVT) OF DISTAL VEIN OF LEFT LOWER EXTREMITY (H): Status: ACTIVE | Noted: 2018-09-06

## 2019-12-19 PROBLEM — D50.9 MICROCYTIC ANEMIA: Status: ACTIVE | Noted: 2018-12-26

## 2019-12-19 PROCEDURE — 90471 IMMUNIZATION ADMIN: CPT | Performed by: PHYSICIAN ASSISTANT

## 2019-12-19 PROCEDURE — 99395 PREV VISIT EST AGE 18-39: CPT | Mod: 25 | Performed by: PHYSICIAN ASSISTANT

## 2019-12-19 PROCEDURE — 87491 CHLMYD TRACH DNA AMP PROBE: CPT | Performed by: PHYSICIAN ASSISTANT

## 2019-12-19 PROCEDURE — 87591 N.GONORRHOEAE DNA AMP PROB: CPT | Performed by: PHYSICIAN ASSISTANT

## 2019-12-19 PROCEDURE — 90686 IIV4 VACC NO PRSV 0.5 ML IM: CPT | Performed by: PHYSICIAN ASSISTANT

## 2019-12-19 RX ORDER — ALBUTEROL SULFATE 90 UG/1
2 AEROSOL, METERED RESPIRATORY (INHALATION) EVERY 6 HOURS
Qty: 3 INHALER | Refills: 1 | Status: SHIPPED | OUTPATIENT
Start: 2019-12-19

## 2019-12-19 ASSESSMENT — ENCOUNTER SYMPTOMS
HEMATOCHEZIA: 0
NERVOUS/ANXIOUS: 0
DYSURIA: 0
CONSTIPATION: 0
DIZZINESS: 0
NAUSEA: 0
SHORTNESS OF BREATH: 0
COUGH: 0
HEARTBURN: 0
WEAKNESS: 0
BREAST MASS: 0
HEADACHES: 0
FEVER: 0
ARTHRALGIAS: 0
DIARRHEA: 0
CHILLS: 0
FREQUENCY: 0
JOINT SWELLING: 0
EYE PAIN: 0
SORE THROAT: 0
ABDOMINAL PAIN: 0
PALPITATIONS: 0
MYALGIAS: 0
HEMATURIA: 0
PARESTHESIAS: 0

## 2019-12-19 ASSESSMENT — MIFFLIN-ST. JEOR: SCORE: 1595.64

## 2019-12-19 NOTE — Clinical Note
Please abstract the following data from this visit with this patient into the appropriate field in Epic:Tests that can be patient reported without a hard copy:Pap smear done on this date: 6/1/18 (approximately), by this group: [facility], results were normal. Other Tests found in the patient's chart through Chart Review/Care Everywhere:{Abstract Quality List (Optional):399368}Note to Abstraction: If this section is blank, no results were found via Chart Review/Care Everywhere.

## 2019-12-19 NOTE — PROGRESS NOTES
SUBJECTIVE:   CC: Natalie Villa is an 29 year old woman who presents for preventive health visit.     Healthy Habits:     Getting at least 3 servings of Calcium per day:  Yes    Bi-annual eye exam:  Yes    Dental care twice a year:  Yes    Sleep apnea or symptoms of sleep apnea:  None    Diet:  Other    Frequency of exercise:  2-3 days/week    Duration of exercise:  45-60 minutes    Taking medications regularly:  Yes    Medication side effects:  Not applicable and None    PHQ-2 Total Score: 1    Additional concerns today:  Yes    Other:  STD testing for GC        Today's PHQ-2 Score:   PHQ-2 ( 1999 Pfizer) 12/16/2019   Q1: Little interest or pleasure in doing things 0   Q2: Feeling down, depressed or hopeless 1   PHQ-2 Score 1   Q1: Little interest or pleasure in doing things Not at all   Q2: Feeling down, depressed or hopeless Several days   PHQ-2 Score 1       Abuse: Current or Past (Physical, Sexual or Emotional) - No  Do you feel safe in your environment? Yes        Social History     Tobacco Use     Smoking status: Never Smoker     Smokeless tobacco: Never Used     Tobacco comment: Pt does not smoke but lives with a smoker (mom)    Substance Use Topics     Alcohol use: Yes     Comment: socially     If you drink alcohol do you typically have >3 drinks per day or >7 drinks per week? No    No flowsheet data found.    Reviewed orders with patient.  Reviewed health maintenance and updated orders accordingly - Yes  Lab work is in process  Labs reviewed in EPIC  BP Readings from Last 3 Encounters:   12/19/19 104/62   12/13/19 112/78   11/15/19 101/67    Wt Readings from Last 3 Encounters:   12/19/19 86.2 kg (190 lb)   11/15/19 84.8 kg (187 lb)   08/09/19 79.8 kg (176 lb)                  Patient Active Problem List   Diagnosis     Crohn's disease of large intestine without complication (H)     Herpes zoster without complication     ASCUS on Pap smear     Chronic tonsillitis     Deep vein thrombosis (DVT) of  distal vein of left lower extremity (H)     Microcytic anemia     Vitamin D deficiency     Exercise-induced asthma     History reviewed. No pertinent surgical history.    Social History     Tobacco Use     Smoking status: Never Smoker     Smokeless tobacco: Never Used     Tobacco comment: Pt does not smoke but lives with a smoker (mom)    Substance Use Topics     Alcohol use: Yes     Comment: socially     History reviewed. No pertinent family history.      Current Outpatient Medications   Medication Sig Dispense Refill     acetaminophen (TYLENOL) 325 MG tablet Take 325-650 mg by mouth every 6 hours as needed for mild pain       albuterol (PROAIR HFA/PROVENTIL HFA/VENTOLIN HFA) 108 (90 Base) MCG/ACT inhaler Inhale 2 puffs into the lungs every 6 hours 3 Inhaler 1     azaTHIOprine (IMURAN) 50 MG tablet Take 1.5 tablets (75 mg) by mouth 2 times daily 270 tablet 3     calcium carbonate (OS-ELIEL) 500 MG tablet Take 2 tablets by mouth daily       Probiotic Product (VSL#3 DS) PACK Take 1 packet by mouth 2 times daily Store in refrigerator 2 each 3     vedolizumab (ENTYVIO) 60 MG/ML injection        Vitamin D, Cholecalciferol, 1000 units CAPS Take 1 tablet by mouth daily       Allergies   Allergen Reactions     Sulfa Drugs Nausea     Gi upset     Recent Labs   Lab Test 11/15/19  0827 10/18/19  0800 09/20/19  0815  07/09/18  0810   ALT 13 11 14   < > 18   CR  --   --   --   --  0.68   GFRESTIMATED  --   --   --   --  >90   GFRESTBLACK  --   --   --   --  >90   POTASSIUM  --   --   --   --  4.0   TSH  --   --   --   --  1.93    < > = values in this interval not displayed.        Mammogram not appropriate for this patient based on age.    Pertinent mammograms are reviewed under the imaging tab.  History of abnormal Pap smear: NO - age 30- 65 PAP every 3 years recommended     Reviewed and updated as needed this visit by clinical staff  Tobacco  Allergies  Meds  Problems  Med Hx  Surg Hx  Fam Hx         Reviewed and updated  "as needed this visit by Provider  Tobacco  Allergies  Meds  Problems  Med Hx  Surg Hx  Fam Hx            Review of Systems   Constitutional: Negative for chills and fever.   HENT: Negative for congestion, ear pain, hearing loss and sore throat.    Eyes: Negative for pain and visual disturbance.   Respiratory: Negative for cough and shortness of breath.    Cardiovascular: Negative for chest pain, palpitations and peripheral edema.   Gastrointestinal: Negative for abdominal pain, constipation, diarrhea, heartburn, hematochezia and nausea.   Breasts:  Negative for tenderness, breast mass and discharge.   Genitourinary: Negative for dysuria, frequency, genital sores, hematuria, pelvic pain, urgency, vaginal bleeding and vaginal discharge.   Musculoskeletal: Negative for arthralgias, joint swelling and myalgias.   Skin: Negative for rash.   Neurological: Negative for dizziness, weakness, headaches and paresthesias.   Psychiatric/Behavioral: Negative for mood changes. The patient is not nervous/anxious.         OBJECTIVE:   /62 (BP Location: Left arm, Patient Position: Sitting, Cuff Size: Adult Regular)   Pulse 61   Temp 98.5  F (36.9  C) (Oral)   Resp 21   Ht 1.664 m (5' 5.5\")   Wt 86.2 kg (190 lb)   LMP 12/06/2019   SpO2 99%   BMI 31.14 kg/m    Physical Exam  GENERAL: healthy, alert and no distress  EYES: Eyes grossly normal to inspection, PERRL and conjunctivae and sclerae normal  HENT: ear canals and TM's normal, nose and mouth without ulcers or lesions  NECK: no adenopathy, no asymmetry, masses, or scars and thyroid normal to palpation  RESP: lungs clear to auscultation - no rales, rhonchi or wheezes  BREAST: normal without masses, tenderness or nipple discharge and no palpable axillary masses or adenopathy  CV: regular rate and rhythm, normal S1 S2, no S3 or S4, no murmur, click or rub, no peripheral edema and peripheral pulses strong  ABDOMEN: soft, nontender, no hepatosplenomegaly, no masses " "and bowel sounds normal  MS: no gross musculoskeletal defects noted, no edema  SKIN: no suspicious lesions or rashes  NEURO: Normal strength and tone, mentation intact and speech normal  PSYCH: mentation appears normal, affect normal/bright    Diagnostic Test Results:  Labs reviewed in Epic    ASSESSMENT/PLAN:       ICD-10-CM    1. Routine general medical examination at a health care facility Z00.00    2. Exercise-induced asthma J45.990 albuterol (PROAIR HFA/PROVENTIL HFA/VENTOLIN HFA) 108 (90 Base) MCG/ACT inhaler   3. Need for immunization against influenza Z23 INFLUENZA VACCINE IM > 6 MONTHS VALENT IIV4 [62720]     ADMIN 1st VACCINE   4. Screen for STD (sexually transmitted disease) Z11.3 Neisseria gonorrhoeae PCR     Chlamydia trachomatis PCR       COUNSELING:  Reviewed preventive health counseling, as reflected in patient instructions       Regular exercise       Healthy diet/nutrition       Vision screening    Estimated body mass index is 31.14 kg/m  as calculated from the following:    Height as of this encounter: 1.664 m (5' 5.5\").    Weight as of this encounter: 86.2 kg (190 lb).    Weight management plan: Discussed healthy diet and exercise guidelines     reports that she has never smoked. She has never used smokeless tobacco.      Counseling Resources:  ATP IV Guidelines  Pooled Cohorts Equation Calculator  Breast Cancer Risk Calculator  FRAX Risk Assessment  ICSI Preventive Guidelines  Dietary Guidelines for Americans, 2010  USDA's MyPlate  ASA Prophylaxis  Lung CA Screening    Xena Arredondo PA-C  [facility]  "

## 2019-12-20 LAB
C TRACH DNA SPEC QL NAA+PROBE: NEGATIVE
N GONORRHOEA DNA SPEC QL NAA+PROBE: NEGATIVE
SPECIMEN SOURCE: NORMAL
SPECIMEN SOURCE: NORMAL

## 2019-12-20 ASSESSMENT — ASTHMA QUESTIONNAIRES: ACT_TOTALSCORE: 23

## 2019-12-20 NOTE — RESULT ENCOUNTER NOTE
"Hello Summer  Your attached labs are negative for gonorrhea and chlamydia.  Please contact the office with any questions or concerns.    Xena Evans \"[place]\" FAHAD Arredondo    "

## 2020-02-05 ENCOUNTER — PATIENT OUTREACH (OUTPATIENT)
Dept: GASTROENTEROLOGY | Facility: CLINIC | Age: 30
End: 2020-02-05

## 2020-02-05 NOTE — PROGRESS NOTES
Called summer and informed her that her PA for her infusions is in process. Patient will call for a infusion center appointment for early next week.  Will stay in contact with the patient in regards to her infusions.    Patient does states that she has been seeing a small amount of blood in the morning with her stools, otherwise she is asymptomatic in regards to her IBD. She admits that she has been drinking an increase in carbonated beverages in the past several weeks ans she wonders if this is causing an increase in symptoms.

## 2020-02-13 ENCOUNTER — TELEPHONE (OUTPATIENT)
Dept: GASTROENTEROLOGY | Facility: CLINIC | Age: 30
End: 2020-02-13

## 2020-02-13 NOTE — TELEPHONE ENCOUNTER
----- Message from Alexander Mariscal sent at 2/11/2020  2:10 PM CST -----  Regarding: FW: employment ending  Lee Rutherford,    I just wanted to give you an update.  We did get the Prior Authorization for the Entyvio approved.  Patient made aware as well.    Thank you for your help  Alexander    ----- Message -----  From: Sangeeta Singh RN  Sent: 2/11/2020  10:50 AM CST  To: Ethan Phoenix, Noelle Koch, RN, #  Subject: RE: employment ending                            Have you heard anything on this patients infusion.  She just started a new job it is hard for her to move appointments    Sangeeta  ----- Message -----  From: Sangeeta Singh RN  Sent: 2/7/2020  10:42 AM CST  To: Ethan Phoenix, Noelle Koch, RN, #  Subject: RE: employment ending                            She is scheduled from her infusion on 2/14.  Have you heard anything from her insurance company?    Thanks    Sangeeta  ----- Message -----  From: Phoenix, Ethan  Sent: 2/4/2020   5:02 PM CST  To: Sangeeta Singh RN, [facility]  Subject: RE: employment ending                            Lee Rutherford,    I did submit the new PA to the insurance company. Turnaround is 5 business days. The site of care will not be an issue and she can remain at an outpatient hospital if she wishes. The problem we face is the frequency which exceeds the policy guidelines.    If the patient cannot wait for determination, please let us know and we'll discuss alternative options.    Thank you!    Ethan Phoenix - [facility]    - [facility]   [facility]   [email or web address]  [email or web address]    Office: [phone]  Fax: [phone]  ----- Message -----  From: Sangeeta Singh RN  Sent: 2/4/2020   2:38 PM CST  To: Sangeeta Singh RN, [facility]  Subject: RE: employment ending                            Hello,    Sorry to be a bother, Has any one been able to check to see if this patient will be able to receive her  infusion in an infusion center or will need to go the [facility]. She needs to have an infusion as soon as possible since she is late     Thanks    Sangeeta  ----- Message -----  From: Sangeeta Singh RN  Sent: 2/4/2020   9:44 AM CST  To: Sangeeta Singh RN, [facility]  Subject: RE: employment ending                            Hello,    I am following up on this message in regards to the patients new insurance information.  I know her insurance has changed to BCBS, can she still receive her infusions in an infusion center or does she need to transition  to home infusion. She has already missed one infusion due to this insurance change.    Please do not hesitate to contact us with any questions or concerns.     Thanks,  Sangeeta Singh RN, BSN  RN care coordinator  [address] [place] [address]  Phone: [phone]  Fax: [phone]  After hours:[phone]         ----- Message -----  From: Sangeeta Singh RN  Sent: 2/3/2020   8:34 AM CST  To: Sangeeta Singh RN, [facility]  Subject: FW: employment ending                            Hello,    This patients new insurance should take affect today it should be BCBS.  I think she has been in touch with Alexander already with the insurance info.  Can we please see if she denis continue to receive infusions at the infusion center.    Thanks  Sangeeta [facility]  [phone]     ----- Message -----  From: Sangeeta Singh RN  Sent: 2/3/2020  To: Sangeeta Singh RN  Subject: FW: employment ending                              ----- Message -----  From: Sangeeta Singh RN  Sent: 1/27/2020  To: Sangeeta Singh RN  Subject: FW: employment ending                              ----- Message -----  From: Sangeeta Singh RN  Sent: 1/23/2020  To: Sangeeta Singh RN  Subject: FW: employment ending                              ----- Message -----  From: Sangeeta Singh RN  Sent: 1/1/2020  To: Sangeeta Singh RN  Subject: FW: employment ending                              ----- Message  -----  From: Alexander Mariscal  Sent: 12/19/2019  10:11 AM CST  To: Sangeeta Singh RN  Subject: FW: employment ending                            Lee Rutherford,    I will look into this and reach out to the patient.    Thank you  Alexander Mariscal  ----- Message -----  From: Sangeeta Singh RN  Sent: 12/18/2019   4:00 PM CST  To: Sangeeta Singh, SHE, [facility]  Subject: employment ending                                Hello,    Patient has sent a my chart message stating that her employment is ending as of December which will leave her without insurance. Patient receives Entyvio every 4 weeks and last infusion was on 12/13.  What if anything can we provider for this patient so she can continue to receive her infusions without delay while she is unemployed and uninsured.    Please do not hesitate to contact us with any questions or concerns.     Thanks,  Sangeeta Singh RN, BSN  RN care coordinator  [address] [place] [id number]  Phone: [phone]  Fax: [phone]  After hours:[phone]

## 2020-02-13 NOTE — TELEPHONE ENCOUNTER
Called patient and left message that her Entyvio infusions have been approved through her new insurance.  Patient left my direct number with any questions she may have.

## 2020-02-14 ENCOUNTER — HOSPITAL ENCOUNTER (OUTPATIENT)
Facility: CLINIC | Age: 30
Setting detail: SPECIMEN
Discharge: HOME | End: 2020-02-14
Attending: INTERNAL MEDICINE | Admitting: INTERNAL MEDICINE
Payer: COMMERCIAL

## 2020-02-14 ENCOUNTER — INFUSION THERAPY VISIT (OUTPATIENT)
Dept: INFUSION THERAPY | Facility: CLINIC | Age: 30
End: 2020-02-14
Attending: INTERNAL MEDICINE
Payer: COMMERCIAL

## 2020-02-14 VITALS
DIASTOLIC BLOOD PRESSURE: 76 MMHG | RESPIRATION RATE: 16 BRPM | TEMPERATURE: 97.9 F | SYSTOLIC BLOOD PRESSURE: 116 MMHG | HEART RATE: 63 BPM | OXYGEN SATURATION: 98 %

## 2020-02-14 DIAGNOSIS — K50.10 CROHN'S DISEASE OF LARGE INTESTINE WITHOUT COMPLICATION (H): Primary | ICD-10-CM

## 2020-02-14 LAB
ALBUMIN SERPL-MCNC: 4 G/DL (ref 3.4–5)
ALP SERPL-CCNC: 58 U/L (ref 40–150)
ALT SERPL W P-5'-P-CCNC: 10 U/L (ref 0–50)
AST SERPL W P-5'-P-CCNC: 12 U/L (ref 0–45)
BASOPHILS # BLD AUTO: 0 10E9/L (ref 0–0.2)
BASOPHILS NFR BLD AUTO: 0.6 %
BILIRUB DIRECT SERPL-MCNC: 0.1 MG/DL (ref 0–0.2)
BILIRUB SERPL-MCNC: 0.7 MG/DL (ref 0.2–1.3)
CRP SERPL-MCNC: 3.6 MG/L (ref 0–8)
DIFFERENTIAL METHOD BLD: ABNORMAL
EOSINOPHIL # BLD AUTO: 0.4 10E9/L (ref 0–0.7)
EOSINOPHIL NFR BLD AUTO: 5.6 %
ERYTHROCYTE [DISTWIDTH] IN BLOOD BY AUTOMATED COUNT: 12 % (ref 10–15)
ERYTHROCYTE [SEDIMENTATION RATE] IN BLOOD BY WESTERGREN METHOD: 10 MM/H (ref 0–20)
HCT VFR BLD AUTO: 36.4 % (ref 35–47)
HGB BLD-MCNC: 11.7 G/DL (ref 11.7–15.7)
IMM GRANULOCYTES # BLD: 0 10E9/L (ref 0–0.4)
IMM GRANULOCYTES NFR BLD: 0.4 %
LYMPHOCYTES # BLD AUTO: 1.2 10E9/L (ref 0.8–5.3)
LYMPHOCYTES NFR BLD AUTO: 17.6 %
MCH RBC QN AUTO: 31.1 PG (ref 26.5–33)
MCHC RBC AUTO-ENTMCNC: 32.1 G/DL (ref 31.5–36.5)
MCV RBC AUTO: 97 FL (ref 78–100)
MONOCYTES # BLD AUTO: 0.5 10E9/L (ref 0–1.3)
MONOCYTES NFR BLD AUTO: 6.5 %
NEUTROPHILS # BLD AUTO: 4.8 10E9/L (ref 1.6–8.3)
NEUTROPHILS NFR BLD AUTO: 69.3 %
NRBC # BLD AUTO: 0 10*3/UL
NRBC BLD AUTO-RTO: 0 /100
PLATELET # BLD AUTO: 275 10E9/L (ref 150–450)
PROT SERPL-MCNC: 8.4 G/DL (ref 6.8–8.8)
RBC # BLD AUTO: 3.76 10E12/L (ref 3.8–5.2)
WBC # BLD AUTO: 7 10E9/L (ref 4–11)

## 2020-02-14 PROCEDURE — 80076 HEPATIC FUNCTION PANEL: CPT | Performed by: INTERNAL MEDICINE

## 2020-02-14 PROCEDURE — 96365 THER/PROPH/DIAG IV INF INIT: CPT

## 2020-02-14 PROCEDURE — 86140 C-REACTIVE PROTEIN: CPT | Performed by: INTERNAL MEDICINE

## 2020-02-14 PROCEDURE — 25800030 ZZH RX IP 258 OP 636: Performed by: INTERNAL MEDICINE

## 2020-02-14 PROCEDURE — 85652 RBC SED RATE AUTOMATED: CPT | Performed by: INTERNAL MEDICINE

## 2020-02-14 PROCEDURE — 36415 COLL VENOUS BLD VENIPUNCTURE: CPT

## 2020-02-14 PROCEDURE — 25000128 H RX IP 250 OP 636: Performed by: INTERNAL MEDICINE

## 2020-02-14 PROCEDURE — 85025 COMPLETE CBC W/AUTO DIFF WBC: CPT | Performed by: INTERNAL MEDICINE

## 2020-02-14 RX ADMIN — VEDOLIZUMAB 300 MG: 300 INJECTION, POWDER, LYOPHILIZED, FOR SOLUTION INTRAVENOUS at 15:23

## 2020-02-14 NOTE — PROGRESS NOTES
Infusion Nursing Note:  Natalie Villa presents today for Entyvio.    Patient seen by provider today: No   present during visit today: Not Applicable.    Note: N/A.    Intravenous Access:  Labs drawn without difficulty.  Peripheral IV placed.    Treatment Conditions:  Biological Infusion Checklist:  ~~~ NOTE: If the patient answers yes to any of the questions below, hold the infusion and contact ordering provider or on-call provider.    1. Have you recently had an elevated temperature, fever, chills, productive cough, coughing for 3 weeks or longer or hemoptysis, abnormal vital signs, night sweats,  chest pain or have you noticed a decrease in your appetite, unexplained weight loss or fatigue? No  2. Do you have any open wounds or new incisions? No  3. Do you have any recent or upcoming hospitalizations, surgeries or dental procedures? No  4. Do you currently have or recently have had any signs of illness or infection or are you on any antibiotics? No  5. Have you had any new, sudden or worsening abdominal pain? No  6. Have you or anyone in your household received a live vaccination in the past 4 weeks? Please note:  No live vaccines while on biologic/chemotherapy until 6 months after the last treatment.  Patient can receive the flu vaccine (shot only) and the pneumovax.  It is optimal for the patient to get these vaccines mid cycle, but they can be given at any time as long as it is not on the day of the infusion. No  7. Have you recently been diagnosed with any new nervous system diseases (ie. Multiple sclerosis, Guillain [place], seizures, neurological changes) or cancer diagnosis? No  8. Are you on any form of radiation or chemotherapy? No  9. Are you pregnant or breast feeding or do you have plans of pregnancy in the future? No  10. Have you been having any signs of worsening depression or suicidal ideations?  (benlysta only) No  11. Have there been any other new onset medical symptoms?  No        Post Infusion Assessment:  Patient tolerated infusion without incident.  Blood return noted pre and post infusion.  Site patent and intact, free from redness, edema or discomfort.  No evidence of extravasations.  Access discontinued per protocol.       Discharge Plan:   Discharge instructions reviewed with: Patient.  Patient and/or family verbalized understanding of discharge instructions and all questions answered.  Copy of AVS reviewed with patient and/or family.  Patient will call to schedule next appt.   Patient discharged in stable condition accompanied by: self.  Departure Mode: Ambulatory.    Yesi Barry RN

## 2020-02-17 ENCOUNTER — PATIENT OUTREACH (OUTPATIENT)
Dept: GASTROENTEROLOGY | Facility: CLINIC | Age: 30
End: 2020-02-17

## 2020-02-17 DIAGNOSIS — K50.10 CROHN'S DISEASE OF LARGE INTESTINE WITHOUT COMPLICATION (H): Primary | ICD-10-CM

## 2020-02-17 NOTE — PROGRESS NOTES
Patient therapy plan orders updated to Guero Arias due to plan expiration Standing lab orders also updated

## 2020-02-18 ENCOUNTER — TELEPHONE (OUTPATIENT)
Dept: GASTROENTEROLOGY | Facility: CLINIC | Age: 30
End: 2020-02-18

## 2020-02-18 NOTE — TELEPHONE ENCOUNTER
Called and left message for patient reminding of appointment scheduled on 2/24/20 at 140pm with [facility]. Patient to arrive 15 min early. To reschedule or cancel patient to call [phone].    RYAN Monge

## 2020-02-24 ENCOUNTER — OFFICE VISIT (OUTPATIENT)
Dept: GASTROENTEROLOGY | Facility: CLINIC | Age: 30
End: 2020-02-24
Payer: COMMERCIAL

## 2020-02-24 VITALS
BODY MASS INDEX: 31.24 KG/M2 | SYSTOLIC BLOOD PRESSURE: 111 MMHG | HEIGHT: 66 IN | DIASTOLIC BLOOD PRESSURE: 67 MMHG | HEART RATE: 65 BPM | WEIGHT: 194.4 LBS | OXYGEN SATURATION: 99 %

## 2020-02-24 DIAGNOSIS — Z71.3 NUTRITIONAL COUNSELING: ICD-10-CM

## 2020-02-24 DIAGNOSIS — K50.10 CROHN'S DISEASE OF LARGE INTESTINE WITHOUT COMPLICATION (H): ICD-10-CM

## 2020-02-24 DIAGNOSIS — K50.10 CROHN'S DISEASE OF LARGE INTESTINE WITHOUT COMPLICATION (H): Primary | ICD-10-CM

## 2020-02-24 DIAGNOSIS — Z91.013 ALLERGIC TO SHELLFISH: Primary | ICD-10-CM

## 2020-02-24 RX ORDER — AZATHIOPRINE 50 MG/1
75 TABLET ORAL 2 TIMES DAILY
Qty: 270 TABLET | Refills: 3 | Status: SHIPPED | OUTPATIENT
Start: 2020-02-24 | End: 2021-03-31

## 2020-02-24 ASSESSMENT — ENCOUNTER SYMPTOMS
HOARSE VOICE: 0
DEPRESSION: 0
JAUNDICE: 0
SORE THROAT: 0
BOWEL INCONTINENCE: 0
HEARTBURN: 0
HOT FLASHES: 0
SINUS CONGESTION: 1
DECREASED LIBIDO: 0
DIARRHEA: 1
NERVOUS/ANXIOUS: 1
BLOOD IN STOOL: 1
SKIN CHANGES: 0
TROUBLE SWALLOWING: 0
NAUSEA: 1
BLOATING: 1
SINUS PAIN: 0
RECTAL PAIN: 0
PANIC: 0
TASTE DISTURBANCE: 0
VOMITING: 0
CONSTIPATION: 0
NECK MASS: 0
ABDOMINAL PAIN: 0
POOR WOUND HEALING: 0
NAIL CHANGES: 0
SMELL DISTURBANCE: 0
INSOMNIA: 1
DECREASED CONCENTRATION: 1

## 2020-02-24 ASSESSMENT — MIFFLIN-ST. JEOR: SCORE: 1615.6

## 2020-02-24 ASSESSMENT — PAIN SCALES - GENERAL: PAINLEVEL: NO PAIN (0)

## 2020-02-24 NOTE — NURSING NOTE
"Chief Complaint   Patient presents with     RECHECK     follow up IBD       Vitals:    02/24/20 1324   BP: 111/67   Pulse: 65   SpO2: 99%   Weight: 88.2 kg (194 lb 6.4 oz)   Height: 1.664 m (5' 5.5\")       Body mass index is 31.86 kg/m .    Janessa Tucker CMA    "

## 2020-02-24 NOTE — PROGRESS NOTES
GI CLINIC VISIT - NEW PATIENT    CC/REFERRING PROVIDER: Referred Self  REASON FOR CONSULTATION: IBD follow up    HPI: 28 year old female with history of Crohn's vs UC on Vedolizumab (failed adalimumab, and questionable antibodies to infliximab, now on vedolizumab since 6/19/2015) + AZA, initially diagnosed with Crohn's in 2013. Her symptoms at the time were bloody diarrhea, left sided abdominal pain and general fatigue. She was treated with Humira for 1 year but stopped after colonoscopy showed ongoing disease. Then was treated with Remicade for about 18 months but per patient report, thinks she developed antibodies to it. She was started on Vedolizumab in June of 2015 initially at 300 mg Q8 weeks which was increased to Q6 weeks and mostly recently to Q4 weeks in May 2018 after colonoscopy in April 2018 showed ongoing colitis. She has been on AZA since diagnosis, initially at 200 mg Qday which has been titrated down to 100 mg Qday.     She had her first occurrence of C. difficile infection after the stool studies were completed after last visit in October 2018.  She completed a vancomycin taper.  Second occurrence (spontaneous without abx trigger) treated with vancomycin QID x 10 days then daily. Fecal esvin elevated to ~300, repeat cscope at that time showed Borrego 1 in descending, sigmoid and rectum.     She is currently having an average of 4 stools per day that are formed.  She has started running again, and occasionally noticed stools can be more loose.  She had one incidence of blood in her stool in January without any associated symptoms.  Due to a job change, she had to miss her January infusion with vedolizumab.  She noticed blood in her stool around this time after missing her dose.    Patient has had a significant amount of changes that have contributed to increased anxiety and some depression.  She changed jobs at the end of 2019, she moved in with her boyfriend, and some stresses with family dynamics.  With  these changes she is noticed increased weight gain, increased alcohol consumption and increased fatigue despite 9 hours of sleep per night.  With the change in jobs, she now works from home, and she finds it difficult to maintain a schedule and remain active.  She also notes she was a week late in her menstrual cycle around the time of when her infusion was due.    Some increased pruritus in scalp that improved with restart of Selsun Blue shampoo.  She notes a questionable new allergy to shellfish with some itchiness in her throat after eating sushi recently.  No joint manifestations.    She also continues to follow with hematology as she was diagnosed with a LLE DVT fall 2018 that was thought to be provoked and completed therapy with Eliquis.  She will have repeat labs this spring with follow up and determine if IV iron infusions are required. Most recently, hemoglobin normal 2/14/20.    ROS: 10pt ROS performed and otherwise negative.    PERTINENT PAST MEDICAL HISTORY:  As noted above.    PREVIOUS ABDOMINAL/GYNECOLOGIC SURGERIES:  None     PREVIOUS ENDOSCOPY:  Colonoscopy 4/23/2018 at [facility]: Mild colitis starting at the transverse colon (Borrego 1), progressing to more moderate colitis in the rectosigmoid (Borrego 2). Bx showed chronic colitis. TI and right colon were unremarkable endoscopically and bx showed mild chronic activity.     Flex sig 10/2/2018 shows Borrego 2/3, continuous, biopsies show moderate chronic active colitis.    Colonoscopy 5/2019 shows  Borrego 1 in descending, sigmoid and rectum. Bx show mild active chronic colitis with cryptitis and focal erosion.    PERTINENT MEDICATIONS:  - Azathioprine 75 mg BID  - Vedolizumab 300 mg Q4W (last dose 9/21/18)  - [facility]   Medications reviewed with patient today, see Medication List/Assessment for details.  No other NSAID/anticoagulation reported by patient.  No other OTC/herbal/supplements reported by patient.    SOCIAL HISTORY:  - Works as   - Uses  alcohol 1-2 times/month   - Smokes marijuana 2-3 times/week  - No tobacco   - Sexually active, uses condoms      FAMILY HISTORY:  No colon/panc/esophageal/other GI CA, no other Valverde or other HPS-related Keo. No IBD/celiac, no other AI/liver/thyroid disease. + for asthma and acne.     ASSESSMENT/PLAN:    1. Colonic Crohn's Disease with concern for indeterminate IBD/phenotypic UC, on vedolizumab (hx of Humira, Remicade), hx of DVT, recurrent Cdiff infection, who presents for follow up:  -- Symptomatic remission.  Will verify ongoing remission with repeat fecal calprotectin. If elevated, repeat colonoscopy.   -- Thiopurine metabolites last obtained 4/2019 and have been optimized at current dose (6TG 356, 6MMP 817)  -- Endoscopic assessment if there is a persistent elevation in fecal calprotectin  -- If alternative medical therapy required in the future consider stelara, given vedolizumab is already maximized.  Would likely not return to antiTNF given she did not have adequate response to adalimumab, but could be considered if dose was able to be further optimized with infliximab.    - Continue Entyvio 300mg IV Q4 weeks (dose adjusted to Q4W in May 2018)  - Continue AZA 75 BID  - Labs with infusions to include LFT, CBC ESR/CRP    2. Cdiff infection x 2: Patient completed a vancomycin taper with great symptomatic response.  There was no notable antibiotic trigger previously, which causes concern for active IBD as trigger for active infection.  Will need to ensure endoscopic healing as stated above to help prevent frequent recurrent infections.     3. LLE DVT:   thought to be provoked (factors include prolonged car ride from [place] to [place] when moved here this summer, strain to gastrocnemius muscle on crutches for 6 weeks, on birth control and active IBD) and is currently on Eliquis.  Concern is of course for ongoing active IBD, leading to a hypercoagulable state.  We will continue to work to optimize IBD regimen as  "stated above.  Patient has now completed anticoagulation therapy.  She discontinued oral contraceptives.     4. IBD health maintenance   Vaccinations:  -- Influenza (every year): Last given 2019  -- TdaP (every 10 years): Last given 2014  -- Pneumococcal Pneumonia (once then every 5 years): Last given 2015  -- Yearly assessment for latent Tb (verbal screening and exam, PPD or QuantiFERON-Tb testing): Indeterminate due to anergy 2017     One time confirmation of immunity or serologies:  -- Hepatitis A (serologies or immunizations): Unknown  -- Hepatitis B (serologies or immunizations): Vaccinated  -- Varicella: had chicken pox as a child  -- MMR: [place]   -- HPV (all aged 18-26): Up to date  -- Meningococcal meningitis (all patients at risk for meningitis): Unknown  -- Due to the immunosuppression in this patient, I would not advise administration of live vaccines such as varicella/VZV, intranasal influenza, MMR, or yellow fever vaccine (if travelling).      Bone mineral density screening   -- DEXA WNL 2015    Cancer Screening:  Colon cancer screening: due 2020  Cervical cancer screening: Annual due to immunosupression    PHYSICAL EXAMINATION:  Vitals reviewed, AFVSS  /67   Pulse 65   Ht 1.664 m (5' 5.5\")   Wt 88.2 kg (194 lb 6.4 oz)   SpO2 99%   BMI 31.86 kg/m    Wt 194# today (Increased about 15 lbs since last visit)   Gen: aaox3, cooperative, pleasant, not diaphoretic, nad  HEENT: ncat, neck supple, normal op w/o ulcer/exudate, anicteric, mmm  Resp/CV without acute findings, not dyspneic/tachycardic  Abd: Nondistended  Ext: no c/c/e  Skin: warm, perfused, no jaundice  Neuro: grossly intact    PERTINENT STUDIES:  CBC RESULTS:   Recent Labs   Lab Test 02/14/20  1505   WBC 7.0   RBC 3.76*   HGB 11.7   HCT 36.4   MCV 97   MCH 31.1   MCHC 32.1   RDW 12.0        CRP Inflammation   Date Value Ref Range Status   02/14/2020 3.6 0.0 - 8.0 mg/L Final       RTC in 3 months, sooner if symptomatic.  "     Guero Arias PA-C  Division of Gastroenterology, Hepatology and Nutrition  [facility]       Answers for HPI/ROS submitted by the patient on 2/24/2020   General Symptoms: No  Skin Symptoms: Yes  HENT Symptoms: Yes  EYE SYMPTOMS: No  HEART SYMPTOMS: No  LUNG SYMPTOMS: No  INTESTINAL SYMPTOMS: Yes  URINARY SYMPTOMS: No  GYNECOLOGIC SYMPTOMS: Yes  BREAST SYMPTOMS: No  SKELETAL SYMPTOMS: No  BLOOD SYMPTOMS: No  NERVOUS SYSTEM SYMPTOMS: No  MENTAL HEALTH SYMPTOMS: Yes  Changes in hair: No  Changes in moles/birth marks: No  Itching: Yes  Rashes: No  Changes in nails: No  Acne: Yes  Hair in places you don't want it: No  Change in facial hair: No  Warts: No  Non-healing sores: No  Scarring: No  Flaking of skin: Yes  Color changes of hands/feet in cold : No  Sun sensitivity: No  Skin thickening: No  Ear pain: Yes  Ear discharge: No  Hearing loss: No  Tinnitus: Yes  Nosebleeds: No  Congestion: Yes  Sinus pain: No  Trouble swallowing: No   Voice hoarseness: No  Mouth sores: Yes  Sore throat: No  Tooth pain: No  Gum tenderness: No  Bleeding gums: No  Change in taste: No  Change in sense of smell: No  Dry mouth: No  Hearing aid used: No  Neck lump: No  Heart burn or indigestion: No  Nausea: Yes  Vomiting: No  Abdominal pain: No  Bloating: Yes  Constipation: No  Diarrhea: Yes  Blood in stool: Yes  Black stools: No  Rectal or Anal pain: No  Fecal incontinence: No  Yellowing of skin or eyes: No  Vomit with blood: No  Change in stools: No  Bleeding or spotting between periods: No  Heavy or painful periods: No  Irregular periods: Yes  Vaginal discharge: No  Hot flashes: No  Vaginal dryness: No  Genital ulcers: No  Reduced libido: No  Painful intercourse: No  Difficulty with sexual arousal: No  Post-menopausal bleeding: No  Nervous or Anxious: Yes  Depression: No  Trouble sleeping: Yes  Trouble thinking or concentrating: Yes  Mood changes: No  Panic attacks: No

## 2020-02-24 NOTE — LETTER
2/24/2020       RE: Natalie Villa  [address]  [facility] [address]     Dear Colleague,    Thank you for referring your patient, Natalie Villa, to the [facility] GASTROENTEROLOGY AND IBD CLINIC at [facility]. Please see a copy of my visit note below.    GI CLINIC VISIT - NEW PATIENT    CC/REFERRING PROVIDER: Referred Self  REASON FOR CONSULTATION: IBD follow up    HPI: 28 year old female with history of Crohn's vs UC on Vedolizumab (failed adalimumab, and questionable antibodies to infliximab, now on vedolizumab since 6/19/2015) + AZA, initially diagnosed with Crohn's in 2013. Her symptoms at the time were bloody diarrhea, left sided abdominal pain and general fatigue. She was treated with Humira for 1 year but stopped after colonoscopy showed ongoing disease. Then was treated with Remicade for about 18 months but per patient report, thinks she developed antibodies to it. She was started on Vedolizumab in June of 2015 initially at 300 mg Q8 weeks which was increased to Q6 weeks and mostly recently to Q4 weeks in May 2018 after colonoscopy in April 2018 showed ongoing colitis. She has been on AZA since diagnosis, initially at 200 mg Qday which has been titrated down to 100 mg Qday.     She had her first occurrence of C. difficile infection after the stool studies were completed after last visit in October 2018.  She completed a vancomycin taper.  Second occurrence (spontaneous without abx trigger) treated with vancomycin QID x 10 days then daily. Fecal esvin elevated to ~300, repeat cscope at that time showed Borrego 1 in descending, sigmoid and rectum.     She is currently having an average of 4 stools per day that are formed.  She has started running again, and occasionally noticed stools can be more loose.  She had one incidence of blood in her stool in January without any associated symptoms.  Due to a job change, she had to miss her January infusion with  vedolizumab.  She noticed blood in her stool around this time after missing her dose.    Patient has had a significant amount of changes that have contributed to increased anxiety and some depression.  She changed jobs at the end of 2019, she moved in with her boyfriend, and some stresses with family dynamics.  With these changes she is noticed increased weight gain, increased alcohol consumption and increased fatigue despite 9 hours of sleep per night.  With the change in jobs, she now works from home, and she finds it difficult to maintain a schedule and remain active.  She also notes she was a week late in her menstrual cycle around the time of when her infusion was due.    Some increased pruritus in scalp that improved with restart of Selsun Blue shampoo.  She notes a questionable new allergy to shellfish with some itchiness in her throat after eating sushi recently.  No joint manifestations.    She also continues to follow with hematology as she was diagnosed with a LLE DVT fall 2018 that was thought to be provoked and completed therapy with Eliquis.  She will have repeat labs this spring with follow up and determine if IV iron infusions are required. Most recently, hemoglobin normal 2/14/20.    ROS: 10pt ROS performed and otherwise negative.    PERTINENT PAST MEDICAL HISTORY:  As noted above.    PREVIOUS ABDOMINAL/GYNECOLOGIC SURGERIES:  None     PREVIOUS ENDOSCOPY:  Colonoscopy 4/23/2018 at [facility]: Mild colitis starting at the transverse colon (Borrego 1), progressing to more moderate colitis in the rectosigmoid (Borrego 2). Bx showed chronic colitis. TI and right colon were unremarkable endoscopically and bx showed mild chronic activity.     Flex sig 10/2/2018 shows Borrego 2/3, continuous, biopsies show moderate chronic active colitis.    Colonoscopy 5/2019 shows  Borrego 1 in descending, sigmoid and rectum. Bx show mild active chronic colitis with cryptitis and focal erosion.    PERTINENT MEDICATIONS:  -  Azathioprine 75 mg BID  - Vedolizumab 300 mg Q4W (last dose 9/21/18)  - Maria Victoria OCP   Medications reviewed with patient today, see Medication List/Assessment for details.  No other NSAID/anticoagulation reported by patient.  No other OTC/herbal/supplements reported by patient.    SOCIAL HISTORY:  - Works as   - Uses alcohol 1-2 times/month   - Smokes marijuana 2-3 times/week  - No tobacco   - Sexually active, uses condoms      FAMILY HISTORY:  No colon/panc/esophageal/other GI CA, no other Valverde or other HPS-related Keo. No IBD/celiac, no other AI/liver/thyroid disease. + for asthma and acne.     ASSESSMENT/PLAN:    1. Colonic Crohn's Disease with concern for indeterminate IBD/phenotypic UC, on vedolizumab (hx of Humira, Remicade), hx of DVT, recurrent Cdiff infection, who presents for follow up:  -- Symptomatic remission.  Will verify ongoing remission with repeat fecal calprotectin. If elevated, repeat colonoscopy.   -- Thiopurine metabolites last obtained 4/2019 and have been optimized at current dose (6TG 356, 6MMP 817)  -- Endoscopic assessment if there is a persistent elevation in fecal calprotectin  -- If alternative medical therapy required in the future consider stelara, given vedolizumab is already maximized.  Would likely not return to antiTNF given she did not have adequate response to adalimumab, but could be considered if dose was able to be further optimized with infliximab.    - Continue Entyvio 300mg IV Q4 weeks (dose adjusted to Q4W in May 2018)  - Continue AZA 75 BID  - Labs with infusions to include LFT, CBC ESR/CRP    2. Cdiff infection x 2: Patient completed a vancomycin taper with great symptomatic response.  There was no notable antibiotic trigger previously, which causes concern for active IBD as trigger for active infection.  Will need to ensure endoscopic healing as stated above to help prevent frequent recurrent infections.     3. LLE DVT:   thought to be provoked (factors include  "prolonged car ride from [place] to [place] when moved here this summer, strain to gastrocnemius muscle on crutches for 6 weeks, on birth control and active IBD) and is currently on Eliquis.  Concern is of course for ongoing active IBD, leading to a hypercoagulable state.  We will continue to work to optimize IBD regimen as stated above.  Patient has now completed anticoagulation therapy.  She discontinued oral contraceptives.     4. IBD health maintenance   Vaccinations:  -- Influenza (every year): Last given 2019  -- TdaP (every 10 years): Last given 2014  -- Pneumococcal Pneumonia (once then every 5 years): Last given 2015  -- Yearly assessment for latent Tb (verbal screening and exam, PPD or QuantiFERON-Tb testing): Indeterminate due to anergy 2017     One time confirmation of immunity or serologies:  -- Hepatitis A (serologies or immunizations): Unknown  -- Hepatitis B (serologies or immunizations): Vaccinated  -- Varicella: had chicken pox as a child  -- MMR: [place]   -- HPV (all aged 18-26): Up to date  -- Meningococcal meningitis (all patients at risk for meningitis): Unknown  -- Due to the immunosuppression in this patient, I would not advise administration of live vaccines such as varicella/VZV, intranasal influenza, MMR, or yellow fever vaccine (if travelling).      Bone mineral density screening   -- DEXA WNL 2015    Cancer Screening:  Colon cancer screening: due 2020  Cervical cancer screening: Annual due to immunosupression    PHYSICAL EXAMINATION:  Vitals reviewed, AFVSS  /67   Pulse 65   Ht 1.664 m (5' 5.5\")   Wt 88.2 kg (194 lb 6.4 oz)   SpO2 99%   BMI 31.86 kg/m     Wt 194# today (Increased about 15 lbs since last visit)   Gen: aaox3, cooperative, pleasant, not diaphoretic, nad  HEENT: ncat, neck supple, normal op w/o ulcer/exudate, anicteric, mmm  Resp/CV without acute findings, not dyspneic/tachycardic  Abd: Nondistended  Ext: no c/c/e  Skin: warm, perfused, no jaundice  Neuro: grossly " intact    PERTINENT STUDIES:  CBC RESULTS:   Recent Labs   Lab Test 02/14/20  1505   WBC 7.0   RBC 3.76*   HGB 11.7   HCT 36.4   MCV 97   MCH 31.1   MCHC 32.1   RDW 12.0        CRP Inflammation   Date Value Ref Range Status   02/14/2020 3.6 0.0 - 8.0 mg/L Final       RTC in 3 months, sooner if symptomatic.      Guero Arias PA-C  Division of Gastroenterology, Hepatology and Nutrition  [facility]

## 2020-02-24 NOTE — PATIENT INSTRUCTIONS
It was a pleasure taking care of you today.  I've included a brief summary of our discussion and care plan from today's visit below.  Please review this information with your primary care provider.  ______________________________________________________________________    My recommendations are summarized as follows:    -- Continue entyvio every 4 weeks and azthioprine 75 mg twice daily   -- Labs with your infusion  -- Stool sample when able   -- Next endoscopic assessment: pending stool sample results   -- Patient with IBD we recommend supplementation vitamin D 1000 units daily and calcium 500 mg twice daily.  -- Vaccines/immunizations to be updated: all up to date   -- Yearly Dermatology visit for skin check while on immunosuppressive therapy. Can call [phone] to schedule.  -- Yearly pap smear while on immunosuppressive therapy  -- No NSAIDs (ibuprofen, or anything containing ibuprofen)     For additional resources about inflammatory bowel disease visit [email or web address]    Return to GI Clinic in 3 months with Dr. Berger to review your progress.    ______________________________________________________________________    Who do I call with any questions after my visit?  Please be in touch if there are any further questions that arise following today's visit.  There are multiple ways to contact your gastroenterology care team.        During business hours, you may reach a Gastroenterology nurse at [phone], option 3.       To schedule or reschedule an appointment, please call [phone].       You can always send a secure message through Avvenu.  Avvenu messages are answered by your nurse or doctor typically within 24 hours.  Please allow extra time on weekends and holidays.        For urgent/emergent questions after business hours, you may reach the on-call GI Fellow by contacting the [facility] at [phone].      In order for your refill to be processed in  a timely fashion, it is your responsibility to ensure you follow the recommendations from your provider regarding your laboratory studies and follow up appointments.       How will I get the results of any tests ordered?    You will receive all of your results.  If you have signed up for [facility], any tests ordered at your visit will be available to you after your physician reviews them.  Typically this takes 1-2 weeks.  If there are urgent results that require a change in your care plan, your physician or nurse will call you to discuss the next steps.      What is iStreamPlanet?  iStreamPlanet is a secure way for you to access all of your healthcare records from the [facility].  It is a web based computer program, so you can sign on to it from any location.  It also allows you to send secure messages to your care team.  I recommend signing up for iStreamPlanet access if you have not already done so and are comfortable with using a computer.      How to I schedule a follow-up visit?  If you did not schedule a follow-up visit today, please call [phone] to schedule a follow-up office visit.        Sincerely,    Guero Arias PA-C  [facility]  Division of Gastroenterology

## 2020-02-24 NOTE — PROGRESS NOTES
[facility] Outpatient Medical Nutrition Therapy      Time Spent: 60 Minutes  Session Type:  Initial Individual Session  Referring Physician: Guero Arias PA-C  Reason for RD Visit: IBD     Nutrition Plan: Continue current diet. Plans to make an effort to prepare more evening meals at home to increase nutrient density of the diet    Recommendations for MD/Provider to order: None at this time    Malnutrition Diagnosis: Patient does not meet the criteria necessary for diagnosing malnutrition    Nutrition Assessment:  Patient is here for intial visit with Registered Dietitian (RD).  Patient is a 29 year old female with history of colonic crohn's disease in symptomatic remission. History of Cdiff infection x2. She was anxious for our visit today due to a history of a complicated elimination diet at the [facility]. She reports following an exclusive enteral diet during the course of the elimination diet and she was afraid we would pursue a similar approach to diet today due to her recent experience with a possible shellfish allergy. Overall she reports a recent history of high stress primarily due to quitting her job. This turbulence contributed to recent weight gain. She has since found a new job where she works from home and has started running again. She notes chronic fatigue that remains unchanged even though she is sleeping 9 hours/night. She notes increased alcohol consumption over the holidays, but reports now only having 1 drink with dinner. She has a history of following an elimination diet, but has reintroduced most of the foods she had eliminated with success. She is not following a specific diet at this time.    Past Surgical History: No IBD surgical history    Symptom Review  1. Nausea/vomiting? Yes: Every once in a while in the morning, but subsides  2. Heartburn? No  3. Bloating? Yes: Seems to just be around soda/carbonated beverages  4. Belching? Yes  5. Feeling full quickly? No  6. Decreased  appetite? No  7. Weight loss/gain? Yes: Gain  8. Constipation/Diarrhea? Yes: Loose stools after running. ~4 BMs/day - mostly formed  9. Urgency? No  10. Incomplete Bowel Emptying? No  11. Abdominal pain/pain with or without eating? No  12. Feeling tired? Yes: Ok. Still feeling a bit tired    IBD-Q Score History  IBDQ Score Date IBDQ - Total Score  (Higher score better)   2/24/2020 56   1/15/2019 63   10/16/2018 63       Dietary beliefs and Practices  1.  Did you modify your diet leading up to diagnosis OR Have you modified your diet since diagnosis?  Yes Elimination diet (Boost, Ensure, and water) and low FODMAP diet. Limits dairy.   2.  Do you believe that certain foods increase the risk of developing IBD? N/A  3.  Do you believe that food/nutrition is an important part of your disease management?  No  4.  Are there specific foods you avoid? Yes: Symptom management. Milk to drink, whole grain breads  5.  Do you believe that specific foods can cause relapse? N/A  6.  Are there specific foods or food groups that you feel help? (symptoms/relapse) Yes: White bread, crackers, bananas  7.  Do you avoid some foods or food groups for fear of worsening the disease flare? N/A  8.  Have you received prior advice on diet?  Yes   A. If so, source? [facility]  9.  Have you, or do you follow, a specific diet?  No   A. Which one(s)?  N/A   B. Did/Do you find it beneficial?  N/A    I. If able to articulate, what specifically is the benefit? N/A  10.  Do you take any vitamin, mineral, or herbal supplements? Yes: Calcium and vitamin D. Taking elderberry gummies as well  11.  Do you use any calorie/protein supplements?  Yes: Will add a scoop of vegan protein to smoothies on occasion  12.  Do you think IBD has influenced your pleasure in eating?  Yes: Especially when first diagnosed  13.  Do you feel stressed or anxious about eating? If so why? Yes: A little bit. Especially when in flares  14.  Do you avoid eating in  "social settings (e.g. Restaurants)?  No    Diet Recall:  (Typical Day)  Meal Name Time Food    Breakfast  Glass of warm water AND either cheerios/captain crunch OR yoplait protein vanilla yogurt with granola and berries OR cup of coffee and doesn't eat anything        Lunch  Salad with turkey and italian dressing (spinach)        Dinner  Takeout (burgers, pad veena, chinese food, tacos) OR mix of soups (tomato, butternut squash, hotpot soup with wantons OR slow cooker taco mix with chicken)        Snacks  Doesn't really snack   Beverages  Water, coffee, juicer (today made carrot/apple juice), tea, almond milk, OJ   Alcohol   Drink with dinner (beer, wine)     Fear of eating with IBD due to history of not eating.     Frequency of eating/taking out meals: At least 3x/wk  Food access/availability: Fine  Food preparation confidence/abilities: Fine    Anthropometrics:   Height: 5' 5.5\"  Weight: 88.2 kg  BMI: 31.86    Weight History:  Wt Readings from Last 10 Encounters:   02/24/20 88.2 kg (194 lb 6.4 oz)   12/19/19 86.2 kg (190 lb)   11/15/19 84.8 kg (187 lb)   08/09/19 79.8 kg (176 lb)   05/31/19 81.6 kg (180 lb)   05/03/19 82.4 kg (181 lb 9.6 oz)   03/08/19 81.2 kg (179 lb)   01/15/19 79.2 kg (174 lb 8 oz)   12/14/18 79.3 kg (174 lb 12.8 oz)   11/13/18 78.5 kg (173 lb)     Usual Weight: 175-180 pounds  Weight change in past 6 months: Increased    Labs: Reviewed  Pertinent Medications/vitamin and mineral supplements:  Current Outpatient Medications   Medication     acetaminophen (TYLENOL) 325 MG tablet     albuterol (PROAIR HFA/PROVENTIL HFA/VENTOLIN HFA) 108 (90 Base) MCG/ACT inhaler     azaTHIOprine (IMURAN) 50 MG tablet     calcium carbonate (OS-ELIEL) 500 MG tablet     Probiotic Product (VSL#3 DS) PACK     vedolizumab (ENTYVIO) 60 MG/ML injection     Vitamin D, Cholecalciferol, 1000 units CAPS     No current facility-administered medications for this visit.        Food Allergies: Shellfish  Food Intolerances: Cows " milk, whole grains, goats milk  Physical Activity: Started running again AND taking classes  Estimated Nutrition Needs based on most recent body weight of 88.2 k calories (20 kcals/kg), 70 g protein (0.8 g/kg), 1750 ml fluids (~1 ml/kcal or total fluids per MD).     MALNUTRITION:  % Weight Loss:  None noted  % Intake:  No decreased intake noted  Subcutaneous Fat Loss:  None observed  Muscle Loss:  None observed  Fluid Retention:  None noted    Nutrition Diagnosis:    Food and nutrition related knowledge deficit related to IBD as evidenced by need for diet education.    Nutrition Prescription: Regular diet    Nutrition Intervention:    Nutrition Education/Counseling:  Discussed diet and IBD history. Reviewed components of common diets currently proposed for IBD: Autoimmune protocol diet, IBD Anti-Inflammatory diet, Specific carbohydrate diet, Semi-vegetarian diet. Discussed that there is limited data on diet in IBD, but that these diets have been followed with some success in IBD. Highlighted that these all have a similar general theme geared towards more of a plant-based, less-processed diet composition. Introduced the IBD Anti-inflammatory diet food list. Discussed that certain aspects of the diet (e.g. gluten free, dairy free) may be overly restrictive, but that the food list seems to provide a reasonable texture-based diet advancement that can help guide food reintroduction. Discussed increasing intake of prebiotic/soluble fiber containing foods (e.g. ground flaxseed, kristen seeds) and consuming a more consistent intake of fiber throughout the day. Discussed that her typical evening meals when prepared at home are nutrient-dense and fiber-containing. No specific concerns with diet at this time, but discussed overall trying to transition to a less processed, more nutrient-dense diet.    Educational Materials Provided: AID-IBD food list  Patient verbalized understanding of education provided. See all  recommendations under Goals.    Goals:  1. Limit intake of fatty/greasy foods    2. Discussed increasing frequency of preparing meals at home for dinner to help increase nutrient density of diet    Nutrition Monitoring and Evaluation: Will monitor adherence to nutrition recommendations at future RD visits.     Further Medical Nutrition Therapy: As needed  Next Appointment (if applicable): N/A  Patient was encouraged to call/contact RD with any further questions.    Joe Obrien, PhD, RD

## 2020-02-24 NOTE — NURSING NOTE
Pt scheduled for follow up with Dr. Prado. Pt sent to lab for stool specimen containers. Pt will be seen by Dr. Joe Obrien.

## 2020-02-24 NOTE — PATIENT INSTRUCTIONS
Hi Summer,    It was great meeting you today. Below is a summary of what we discussed:    1. Limit intake of fatty/greasy foods    2. Discussed increasing frequency of preparing meals at home for dinner to help increase nutrient density of diet    Best regards,  Joe Obrien, PhD, RD

## 2020-02-24 NOTE — LETTER
2/24/2020       RE: Natalie Villa  [address]  [place] [address]     Dear Colleague,    Thank you for referring your patient, Natalie Villa, to the [facility] GASTROENTEROLOGY [facility] IBD CLINIC at [facility]. Please see a copy of my visit note below.    [facility] Outpatient Medical Nutrition Therapy      Time Spent: 60 Minutes  Session Type:  Initial Individual Session  Referring Physician: Guero Arias PA-C  Reason for RD Visit: IBD     Nutrition Plan: Continue current diet. Plans to make an effort to prepare more evening meals at home to increase nutrient density of the diet    Recommendations for MD/Provider to order: None at this time    Malnutrition Diagnosis: Patient does not meet the criteria necessary for diagnosing malnutrition    Nutrition Assessment:  Patient is here for intial visit with Registered Dietitian (KEV).  Patient is a 29 year old female with history of colonic crohn's disease in symptomatic remission. History of Cdiff infection x2. She was anxious for our visit today due to a history of a complicated elimination diet at the [facility]. She reports following an exclusive enteral diet during the course of the elimination diet and she was afraid we would pursue a similar approach to diet today due to her recent experience with a possible shellfish allergy. Overall she reports a recent history of high stress primarily due to quitting her job. This turbulence contributed to recent weight gain. She has since found a new job where she works from home and has started running again. She notes chronic fatigue that remains unchanged even though she is sleeping 9 hours/night. She notes increased alcohol consumption over the holidays, but reports now only having 1 drink with dinner. She has a history of following an elimination diet, but has reintroduced most of the foods she had eliminated with success. She is not following a specific diet  at this time.    Past Surgical History: No IBD surgical history    Symptom Review  1. Nausea/vomiting? Yes: Every once in a while in the morning, but subsides  2. Heartburn? No  3. Bloating? Yes: Seems to just be around soda/carbonated beverages  4. Belching? Yes  5. Feeling full quickly? No  6. Decreased appetite? No  7. Weight loss/gain? Yes: Gain  8. Constipation/Diarrhea? Yes: Loose stools after running. ~4 BMs/day - mostly formed  9. Urgency? No  10. Incomplete Bowel Emptying? No  11. Abdominal pain/pain with or without eating? No  12. Feeling tired? Yes: Ok. Still feeling a bit tired    IBD-Q Score History  IBDQ Score Date IBDQ - Total Score  (Higher score better)   2/24/2020 56   1/15/2019 63   10/16/2018 63       Dietary beliefs and Practices  1.  Did you modify your diet leading up to diagnosis OR Have you modified your diet since diagnosis?  Yes Elimination diet (Boost, Ensure, and water) and low FODMAP diet. Limits dairy.   2.  Do you believe that certain foods increase the risk of developing IBD? N/A  3.  Do you believe that food/nutrition is an important part of your disease management?  No  4.  Are there specific foods you avoid? Yes: Symptom management. Milk to drink, whole grain breads  5.  Do you believe that specific foods can cause relapse? N/A  6.  Are there specific foods or food groups that you feel help? (symptoms/relapse) Yes: White bread, crackers, bananas  7.  Do you avoid some foods or food groups for fear of worsening the disease flare? N/A  8.  Have you received prior advice on diet?  Yes   A. If so, source? [facility]  9.  Have you, or do you follow, a specific diet?  No   A. Which one(s)?  N/A   B. Did/Do you find it beneficial?  N/A    I. If able to articulate, what specifically is the benefit? N/A  10.  Do you take any vitamin, mineral, or herbal supplements? Yes: Calcium and vitamin D. Taking elderberry gummies as well  11.  Do you use any calorie/protein supplements?   "Yes: Will add a scoop of vegan protein to smoothies on occasion  12.  Do you think IBD has influenced your pleasure in eating?  Yes: Especially when first diagnosed  13.  Do you feel stressed or anxious about eating? If so why? Yes: A little bit. Especially when in flares  14.  Do you avoid eating in social settings (e.g. Restaurants)?  No    Diet Recall:  (Typical Day)  Meal Name Time Food    Breakfast  Glass of warm water AND either cheerios/captain crunch OR yoplait protein vanilla yogurt with granola and berries OR cup of coffee and doesn't eat anything        Lunch  Salad with turkey and italian dressing (spinach)        Dinner  Takeout (burgers, pad Serbian, chinese food, tacos) OR mix of soups (tomato, butternut squash, hotpot soup with wantons OR slow cooker taco mix with chicken)        Snacks  Doesn't really snack   Beverages  Water, coffee, juicer (today made carrot/apple juice), tea, almond milk, OJ   Alcohol   Drink with dinner (beer, wine)     Fear of eating with IBD due to history of not eating.     Frequency of eating/taking out meals: At least 3x/wk  Food access/availability: Fine  Food preparation confidence/abilities: Fine    Anthropometrics:   Height: 5' 5.5\"  Weight: 88.2 kg  BMI: 31.86    Weight History:  Wt Readings from Last 10 Encounters:   02/24/20 88.2 kg (194 lb 6.4 oz)   12/19/19 86.2 kg (190 lb)   11/15/19 84.8 kg (187 lb)   08/09/19 79.8 kg (176 lb)   05/31/19 81.6 kg (180 lb)   05/03/19 82.4 kg (181 lb 9.6 oz)   03/08/19 81.2 kg (179 lb)   01/15/19 79.2 kg (174 lb 8 oz)   12/14/18 79.3 kg (174 lb 12.8 oz)   11/13/18 78.5 kg (173 lb)     Usual Weight: 175-180 pounds  Weight change in past 6 months: Increased    Labs: Reviewed  Pertinent Medications/vitamin and mineral supplements:  Current Outpatient Medications   Medication     acetaminophen (TYLENOL) 325 MG tablet     albuterol (PROAIR HFA/PROVENTIL HFA/VENTOLIN HFA) 108 (90 Base) MCG/ACT inhaler     azaTHIOprine (IMURAN) 50 MG tablet "     calcium carbonate (OS-ELIEL) 500 MG tablet     Probiotic Product (VSL#3 DS) PACK     vedolizumab (ENTYVIO) 60 MG/ML injection     Vitamin D, Cholecalciferol, 1000 units CAPS     No current facility-administered medications for this visit.        Food Allergies: Shellfish  Food Intolerances: Cows milk, whole grains, goats milk  Physical Activity: Started running again AND taking classes  Estimated Nutrition Needs based on most recent body weight of 88.2 k calories (20 kcals/kg), 70 g protein (0.8 g/kg), 1750 ml fluids (~1 ml/kcal or total fluids per MD).     MALNUTRITION:  % Weight Loss:  None noted  % Intake:  No decreased intake noted  Subcutaneous Fat Loss:  None observed  Muscle Loss:  None observed  Fluid Retention:  None noted    Nutrition Diagnosis:    Food and nutrition related knowledge deficit related to IBD as evidenced by need for diet education.    Nutrition Prescription: Regular diet    Nutrition Intervention:    Nutrition Education/Counseling:  Discussed diet and IBD history. Reviewed components of common diets currently proposed for IBD: Autoimmune protocol diet, IBD Anti-Inflammatory diet, Specific carbohydrate diet, Semi-vegetarian diet. Discussed that there is limited data on diet in IBD, but that these diets have been followed with some success in IBD. Highlighted that these all have a similar general theme geared towards more of a plant-based, less-processed diet composition. Introduced the IBD Anti-inflammatory diet food list. Discussed that certain aspects of the diet (e.g. gluten free, dairy free) may be overly restrictive, but that the food list seems to provide a reasonable texture-based diet advancement that can help guide food reintroduction. Discussed increasing intake of prebiotic/soluble fiber containing foods (e.g. ground flaxseed, kristen seeds) and consuming a more consistent intake of fiber throughout the day. Discussed that her typical evening meals when prepared at home are  nutrient-dense and fiber-containing. No specific concerns with diet at this time, but discussed overall trying to transition to a less processed, more nutrient-dense diet.    Educational Materials Provided: AID-IBD food list  Patient verbalized understanding of education provided. See all recommendations under Goals.    Goals:  1. Limit intake of fatty/greasy foods    2. Discussed increasing frequency of preparing meals at home for dinner to help increase nutrient density of diet    Nutrition Monitoring and Evaluation: Will monitor adherence to nutrition recommendations at future RD visits.     Further Medical Nutrition Therapy: As needed  Next Appointment (if applicable): N/A  Patient was encouraged to call/contact RD with any further questions.    Joe Obrien, PhD, RD

## 2020-02-27 NOTE — TELEPHONE ENCOUNTER
FUTURE VISIT INFORMATION      FUTURE VISIT INFORMATION:    Date: 3.18.20    Time: 1:00    Location: [facility]  REFERRAL INFORMATION:    Referring provider:  Guero Arias PA-C    Referring providers clinic:  Gastro    Reason for visit/diagnosis:  Allergic to shellfish [Z91.013] REFERRAL Guero Arias PA-C    RECORDS REQUESTED FROM:       Clinic name Comments Records Status Imaging Status   Gastro 2.24.20 Guero Arias [facility] N/A

## 2020-03-13 ENCOUNTER — HOSPITAL ENCOUNTER (OUTPATIENT)
Facility: CLINIC | Age: 30
Setting detail: SPECIMEN
Discharge: HOME | End: 2020-03-13
Attending: INTERNAL MEDICINE | Admitting: PHYSICIAN ASSISTANT
Payer: COMMERCIAL

## 2020-03-13 ENCOUNTER — INFUSION THERAPY VISIT (OUTPATIENT)
Dept: INFUSION THERAPY | Facility: CLINIC | Age: 30
End: 2020-03-13
Attending: INTERNAL MEDICINE
Payer: COMMERCIAL

## 2020-03-13 VITALS
DIASTOLIC BLOOD PRESSURE: 72 MMHG | TEMPERATURE: 98.1 F | RESPIRATION RATE: 16 BRPM | HEART RATE: 67 BPM | SYSTOLIC BLOOD PRESSURE: 115 MMHG | OXYGEN SATURATION: 95 %

## 2020-03-13 DIAGNOSIS — K50.10 CROHN'S DISEASE OF LARGE INTESTINE WITHOUT COMPLICATION (H): Primary | ICD-10-CM

## 2020-03-13 LAB
ALBUMIN SERPL-MCNC: 3.9 G/DL (ref 3.4–5)
ALP SERPL-CCNC: 55 U/L (ref 40–150)
ALT SERPL W P-5'-P-CCNC: 9 U/L (ref 0–50)
AST SERPL W P-5'-P-CCNC: 9 U/L (ref 0–45)
BASOPHILS # BLD AUTO: 0 10E9/L (ref 0–0.2)
BASOPHILS NFR BLD AUTO: 0.8 %
BILIRUB DIRECT SERPL-MCNC: 0.2 MG/DL (ref 0–0.2)
BILIRUB SERPL-MCNC: 0.8 MG/DL (ref 0.2–1.3)
CRP SERPL-MCNC: <2.9 MG/L (ref 0–8)
DIFFERENTIAL METHOD BLD: NORMAL
EOSINOPHIL # BLD AUTO: 0.3 10E9/L (ref 0–0.7)
EOSINOPHIL NFR BLD AUTO: 5.5 %
ERYTHROCYTE [DISTWIDTH] IN BLOOD BY AUTOMATED COUNT: 12.5 % (ref 10–15)
ERYTHROCYTE [SEDIMENTATION RATE] IN BLOOD BY WESTERGREN METHOD: 10 MM/H (ref 0–20)
HCT VFR BLD AUTO: 37.2 % (ref 35–47)
HGB BLD-MCNC: 12.3 G/DL (ref 11.7–15.7)
IMM GRANULOCYTES # BLD: 0 10E9/L (ref 0–0.4)
IMM GRANULOCYTES NFR BLD: 0.4 %
LYMPHOCYTES # BLD AUTO: 1.1 10E9/L (ref 0.8–5.3)
LYMPHOCYTES NFR BLD AUTO: 20.9 %
MCH RBC QN AUTO: 31.1 PG (ref 26.5–33)
MCHC RBC AUTO-ENTMCNC: 33.1 G/DL (ref 31.5–36.5)
MCV RBC AUTO: 94 FL (ref 78–100)
MONOCYTES # BLD AUTO: 0.5 10E9/L (ref 0–1.3)
MONOCYTES NFR BLD AUTO: 8.8 %
NEUTROPHILS # BLD AUTO: 3.3 10E9/L (ref 1.6–8.3)
NEUTROPHILS NFR BLD AUTO: 63.6 %
NRBC # BLD AUTO: 0 10*3/UL
NRBC BLD AUTO-RTO: 0 /100
PLATELET # BLD AUTO: 266 10E9/L (ref 150–450)
PROT SERPL-MCNC: 8 G/DL (ref 6.8–8.8)
RBC # BLD AUTO: 3.95 10E12/L (ref 3.8–5.2)
WBC # BLD AUTO: 5.1 10E9/L (ref 4–11)

## 2020-03-13 PROCEDURE — 86140 C-REACTIVE PROTEIN: CPT | Performed by: PHYSICIAN ASSISTANT

## 2020-03-13 PROCEDURE — 80076 HEPATIC FUNCTION PANEL: CPT | Performed by: PHYSICIAN ASSISTANT

## 2020-03-13 PROCEDURE — 25800030 ZZH RX IP 258 OP 636: Performed by: INTERNAL MEDICINE

## 2020-03-13 PROCEDURE — 85025 COMPLETE CBC W/AUTO DIFF WBC: CPT | Performed by: PHYSICIAN ASSISTANT

## 2020-03-13 PROCEDURE — 36415 COLL VENOUS BLD VENIPUNCTURE: CPT

## 2020-03-13 PROCEDURE — 96365 THER/PROPH/DIAG IV INF INIT: CPT

## 2020-03-13 PROCEDURE — 25000128 H RX IP 250 OP 636: Performed by: INTERNAL MEDICINE

## 2020-03-13 PROCEDURE — 85652 RBC SED RATE AUTOMATED: CPT | Performed by: PHYSICIAN ASSISTANT

## 2020-03-13 RX ADMIN — VEDOLIZUMAB 300 MG: 300 INJECTION, POWDER, LYOPHILIZED, FOR SOLUTION INTRAVENOUS at 08:23

## 2020-03-13 NOTE — PROGRESS NOTES
Infusion Nursing Note:  Natalie Villa presents today for Entyio.    Patient seen by provider today: No   present during visit today: Not Applicable.    Note: N/A.    Intravenous Access:  Lab draw site L AC, Needle type piv, Gauge 24.  Labs drawn without difficulty.  Peripheral IV placed.    Treatment Conditions:  Labs drawn:  Hepatic panel  CRP inflammation  ESR  CBC  ~~~ NOTE: If the patient answers yes to any of the questions below, hold the infusion and contact ordering provider or on-call provider.    1. Have you recently had an elevated temperature, fever, chills, productive cough, coughing for 3 weeks or longer or hemoptysis,  abnormal vital signs, night sweats,  chest pain or have you noticed a decrease in your appetite, unexplained weight loss or fatigue? No  2. Do you have any open wounds or new incisions? No  3. Do you have any recent or upcoming hospitalizations, surgeries or dental procedures? No  4. Do you currently have or recently have had any signs of illness or infection or are you on any antibiotics? No  5. Have you had any new, sudden or worsening abdominal pain? No  6. Have you or anyone in your household received a live vaccination in the past 4 weeks? Please note:  No live vaccines while on biologic/chemotherapy until 6 months after the last treatment.  Patient can receive the flu vaccine (shot only) and the pneumovax.  It is optimal for the patient to get these vaccines mid cycle, but they can be given at any time as long as it is not on the day of the infusion. No  7. Have you recently been diagnosed with any new nervous system diseases (ie. Multiple sclerosis, Guillain [place], seizures, neurological changes) or cancer diagnosis? Are you on any form of radiation or chemotherapy? No  8. Are you pregnant or breast feeding or do you have plans of pregnancy in the future? No  9. Have you been having any signs of worsening depression or suicidal ideations?  (benlysta only)  No  10. Have there been any other new onset medical symptoms? No      Post Infusion Assessment:  Patient tolerated infusion without incident.  Blood return noted pre and post infusion.  Site patent and intact, free from redness, edema or discomfort.  No evidence of extravasations.  Access discontinued per protocol.       Discharge Plan:   Discharge instructions reviewed with: Patient.  Patient and/or family verbalized understanding of discharge instructions and all questions answered.  AVS to patient via Induction ManagerHART.  Patient will return 4/410 for next appointment.   Patient discharged in stable condition accompanied by: self.  Departure Mode: Ambulatory.    Irma Jarrett RN

## 2020-03-18 ENCOUNTER — PRE VISIT (OUTPATIENT)
Dept: ALLERGY | Facility: CLINIC | Age: 30
End: 2020-03-18

## 2020-03-23 ENCOUNTER — TELEPHONE (OUTPATIENT)
Dept: ALLERGY | Facility: CLINIC | Age: 30
End: 2020-03-23

## 2020-04-10 ENCOUNTER — INFUSION THERAPY VISIT (OUTPATIENT)
Dept: INFUSION THERAPY | Facility: CLINIC | Age: 30
End: 2020-04-10
Attending: INTERNAL MEDICINE
Payer: COMMERCIAL

## 2020-04-10 VITALS
TEMPERATURE: 97.5 F | SYSTOLIC BLOOD PRESSURE: 117 MMHG | DIASTOLIC BLOOD PRESSURE: 65 MMHG | HEART RATE: 75 BPM | RESPIRATION RATE: 16 BRPM | OXYGEN SATURATION: 100 %

## 2020-04-10 DIAGNOSIS — K50.10 CROHN'S DISEASE OF LARGE INTESTINE WITHOUT COMPLICATION (H): Primary | ICD-10-CM

## 2020-04-10 PROCEDURE — 96365 THER/PROPH/DIAG IV INF INIT: CPT

## 2020-04-10 PROCEDURE — 25800030 ZZH RX IP 258 OP 636: Performed by: PHYSICIAN ASSISTANT

## 2020-04-10 PROCEDURE — 25000128 H RX IP 250 OP 636: Performed by: PHYSICIAN ASSISTANT

## 2020-04-10 RX ADMIN — SODIUM CHLORIDE 250 ML: 9 INJECTION, SOLUTION INTRAVENOUS at 08:35

## 2020-04-10 RX ADMIN — VEDOLIZUMAB 300 MG: 300 INJECTION, POWDER, LYOPHILIZED, FOR SOLUTION INTRAVENOUS at 08:35

## 2020-04-10 NOTE — PROGRESS NOTES
Infusion Nursing Note:  Natalie Villa presents today for Entivyo.    Patient seen by provider today: No   present during visit today: Not Applicable.    Note: N/A.    Intravenous Access:  Peripheral IV placed.    Treatment Conditions:  Results reviewed, labs MET treatment parameters, ok to proceed with treatment.  Biological Infusion Checklist:  ~~~ NOTE: If the patient answers yes to any of the questions below, hold the infusion and contact ordering provider or on-call provider.    1. Have you recently had an elevated temperature, fever, chills, productive cough, coughing for 3 weeks or longer or hemoptysis, abnormal vital signs, night sweats,  chest pain or have you noticed a decrease in your appetite, unexplained weight loss or fatigue? No  2. Do you have any open wounds or new incisions? No  3. Do you have any recent or upcoming hospitalizations, surgeries or dental procedures? No  4. Do you currently have or recently have had any signs of illness or infection or are you on any antibiotics? No  5. Have you had any new, sudden or worsening abdominal pain? No  6. Have you or anyone in your household received a live vaccination in the past 4 weeks? Please note:  No live vaccines while on biologic/chemotherapy until 6 months after the last treatment.  Patient can receive the flu vaccine (shot only) and the pneumovax.  It is optimal for the patient to get these vaccines mid cycle, but they can be given at any time as long as it is not on the day of the infusion. No  7. Have you recently been diagnosed with any new nervous system diseases (ie. Multiple sclerosis, Guillain [place], seizures, neurological changes) or cancer diagnosis? No  8. Are you on any form of radiation or chemotherapy? No  9. Are you pregnant or breast feeding or do you have plans of pregnancy in the future? No  10. Have you been having any signs of worsening depression or suicidal ideations?  (benlysta only) No  11. Have there been any  other new onset medical symptoms? No        Post Infusion Assessment:  Patient tolerated infusion without incident.  Blood return noted pre and post infusion.  Site patent and intact, free from redness, edema or discomfort.  No evidence of extravasations.  Access discontinued per protocol.  Biologic Infusion Post Education: Call the triage nurse at your clinic or seek medical attention if you have chills and/or temperature greater than or equal to 100.5, uncontrolled nausea/vomiting, diarrhea, constipation, dizziness, shortness of breath, chest pain, heart palpitations, weakness or any other new or concerning symptoms, questions or concerns.  You cannot have any live virus vaccines prior to or during treatment or up to 6 months post infusion.  If you have an upcoming surgery, medical procedure or dental procedure during treatment, this should be discussed with your ordering physician and your surgeon/dentist.  If you are having any concerning symptom, if you are unsure if you should get your next infusion or wish to speak to a provider before your next infusion, please call your care coordinator or triage nurse at your clinic to notify them so we can adequately serve you.       Discharge Plan:   Discharge instructions reviewed with: Patient.  Patient and/or family verbalized understanding of discharge instructions and all questions answered.  AVS to patient via Rezdy.  Patient will return 5/8/2020 for next appointment.   Patient discharged in stable condition accompanied by: self.  Departure Mode: Ambulatory.    Leila Márquez RN

## 2020-05-08 ENCOUNTER — HOSPITAL ENCOUNTER (OUTPATIENT)
Facility: CLINIC | Age: 30
Setting detail: SPECIMEN
Discharge: HOME | End: 2020-05-08
Attending: INTERNAL MEDICINE | Admitting: PHYSICIAN ASSISTANT
Payer: COMMERCIAL

## 2020-05-08 ENCOUNTER — INFUSION THERAPY VISIT (OUTPATIENT)
Dept: INFUSION THERAPY | Facility: CLINIC | Age: 30
End: 2020-05-08
Attending: INTERNAL MEDICINE
Payer: COMMERCIAL

## 2020-05-08 VITALS
TEMPERATURE: 97.5 F | RESPIRATION RATE: 16 BRPM | DIASTOLIC BLOOD PRESSURE: 65 MMHG | HEART RATE: 85 BPM | OXYGEN SATURATION: 97 % | SYSTOLIC BLOOD PRESSURE: 107 MMHG

## 2020-05-08 DIAGNOSIS — K50.10 CROHN'S DISEASE OF LARGE INTESTINE WITHOUT COMPLICATION (H): Primary | ICD-10-CM

## 2020-05-08 LAB
ALBUMIN SERPL-MCNC: 4 G/DL (ref 3.4–5)
ALP SERPL-CCNC: 52 U/L (ref 40–150)
ALT SERPL W P-5'-P-CCNC: 12 U/L (ref 0–50)
AST SERPL W P-5'-P-CCNC: 11 U/L (ref 0–45)
BASOPHILS # BLD AUTO: 0 10E9/L (ref 0–0.2)
BASOPHILS NFR BLD AUTO: 0.6 %
BILIRUB DIRECT SERPL-MCNC: 0.2 MG/DL (ref 0–0.2)
BILIRUB SERPL-MCNC: 0.9 MG/DL (ref 0.2–1.3)
CRP SERPL-MCNC: 25.1 MG/L (ref 0–8)
DIFFERENTIAL METHOD BLD: ABNORMAL
EOSINOPHIL # BLD AUTO: 0.3 10E9/L (ref 0–0.7)
EOSINOPHIL NFR BLD AUTO: 5.6 %
ERYTHROCYTE [DISTWIDTH] IN BLOOD BY AUTOMATED COUNT: 12.4 % (ref 10–15)
ERYTHROCYTE [SEDIMENTATION RATE] IN BLOOD BY WESTERGREN METHOD: 9 MM/H (ref 0–20)
HCT VFR BLD AUTO: 39.3 % (ref 35–47)
HGB BLD-MCNC: 12.2 G/DL (ref 11.7–15.7)
IMM GRANULOCYTES # BLD: 0 10E9/L (ref 0–0.4)
IMM GRANULOCYTES NFR BLD: 0.6 %
LYMPHOCYTES # BLD AUTO: 0.6 10E9/L (ref 0.8–5.3)
LYMPHOCYTES NFR BLD AUTO: 11.3 %
MCH RBC QN AUTO: 31.4 PG (ref 26.5–33)
MCHC RBC AUTO-ENTMCNC: 31 G/DL (ref 31.5–36.5)
MCV RBC AUTO: 101 FL (ref 78–100)
MONOCYTES # BLD AUTO: 0.3 10E9/L (ref 0–1.3)
MONOCYTES NFR BLD AUTO: 7 %
NEUTROPHILS # BLD AUTO: 3.6 10E9/L (ref 1.6–8.3)
NEUTROPHILS NFR BLD AUTO: 74.9 %
NRBC # BLD AUTO: 0 10*3/UL
NRBC BLD AUTO-RTO: 0 /100
PLATELET # BLD AUTO: 267 10E9/L (ref 150–450)
PROT SERPL-MCNC: 8.6 G/DL (ref 6.8–8.8)
RBC # BLD AUTO: 3.89 10E12/L (ref 3.8–5.2)
WBC # BLD AUTO: 4.9 10E9/L (ref 4–11)

## 2020-05-08 PROCEDURE — 25000128 H RX IP 250 OP 636: Performed by: PHYSICIAN ASSISTANT

## 2020-05-08 PROCEDURE — 86140 C-REACTIVE PROTEIN: CPT | Performed by: PHYSICIAN ASSISTANT

## 2020-05-08 PROCEDURE — 85652 RBC SED RATE AUTOMATED: CPT | Performed by: PHYSICIAN ASSISTANT

## 2020-05-08 PROCEDURE — 80076 HEPATIC FUNCTION PANEL: CPT | Performed by: PHYSICIAN ASSISTANT

## 2020-05-08 PROCEDURE — 85025 COMPLETE CBC W/AUTO DIFF WBC: CPT | Performed by: PHYSICIAN ASSISTANT

## 2020-05-08 PROCEDURE — 25800030 ZZH RX IP 258 OP 636: Performed by: PHYSICIAN ASSISTANT

## 2020-05-08 PROCEDURE — 96365 THER/PROPH/DIAG IV INF INIT: CPT

## 2020-05-08 RX ADMIN — VEDOLIZUMAB 300 MG: 300 INJECTION, POWDER, LYOPHILIZED, FOR SOLUTION INTRAVENOUS at 08:25

## 2020-05-08 NOTE — PROGRESS NOTES
Infusion Nursing Note:  Natalie Villa presents today for Entyvio. labs.     present during visit today: Not Applicable.    Note: N/A.    Intravenous Access:  Lab draw site L lateral, Needle type piv, Gauge 24.  Labs drawn without difficulty.  Peripheral IV placed.    Treatment Conditions:  ~~~ NOTE: If the patient answers yes to any of the questions below, hold the infusion and contact ordering provider or on-call provider.    1. Have you recently had an elevated temperature, fever, chills, productive cough, coughing for 3 weeks or longer or hemoptysis,  abnormal vital signs, night sweats,  chest pain or have you noticed a decrease in your appetite, unexplained weight loss or fatigue? No  2. Do you have any open wounds or new incisions? No  3. Do you have any recent or upcoming hospitalizations, surgeries or dental procedures? No  4. Do you currently have or recently have had any signs of illness or infection or are you on any antibiotics? No  5. Have you had any new, sudden or worsening abdominal pain? No  6. Have you or anyone in your household received a live vaccination in the past 4 weeks? Please note:  No live vaccines while on biologic/chemotherapy until 6 months after the last treatment.  Patient can receive the flu vaccine (shot only) and the pneumovax.  It is optimal for the patient to get these vaccines mid cycle, but they can be given at any time as long as it is not on the day of the infusion. No  7. Have you recently been diagnosed with any new nervous system diseases (ie. Multiple sclerosis, Guillain [place], seizures, neurological changes) or cancer diagnosis? Are you on any form of radiation or chemotherapy? No  8. Are you pregnant or breast feeding or do you have plans of pregnancy in the future? No  9. Have you been having any signs of worsening depression or suicidal ideations?  (benlysta only) No  10. Have there been any other new onset medical symptoms? No      Post Lab  Assessment:  Patient tolerated infusion .  Site patent and intact, free from redness, edema or discomfort.  No evidence of extravasations.  Access  (discontinued)     Discharge Plan:   Patient and/or family verbalized understanding of  instructions and all questions answered.  Patient  to lobby in stable condition accompanied by: self.  Patient to see provider today: No  Departure Mode: Ambulatory.  Irma Jarrett, RN, RN

## 2020-05-11 ENCOUNTER — TELEPHONE (OUTPATIENT)
Dept: GASTROENTEROLOGY | Facility: CLINIC | Age: 30
End: 2020-05-11

## 2020-05-19 ENCOUNTER — VIRTUAL VISIT (OUTPATIENT)
Dept: GASTROENTEROLOGY | Facility: CLINIC | Age: 30
End: 2020-05-19
Payer: COMMERCIAL

## 2020-05-19 DIAGNOSIS — R19.7 DIARRHEA, UNSPECIFIED TYPE: ICD-10-CM

## 2020-05-19 DIAGNOSIS — K52.3 INDETERMINATE COLITIS: Primary | ICD-10-CM

## 2020-05-19 ASSESSMENT — PAIN SCALES - GENERAL: PAINLEVEL: NO PAIN (0)

## 2020-05-19 NOTE — NURSING NOTE
Chief Complaint   Patient presents with     RECHECK     follow up       There were no vitals filed for this visit.    There is no height or weight on file to calculate BMI.    Janessa Booth, CMA

## 2020-05-19 NOTE — PATIENT INSTRUCTIONS
PLAN  ---Check fecal calprotectin for gut-specific inflammation  ---Check stool for C diff, giardia, enteric pathogens to rule out infection   -------------If c diff is positive again, will refer to C diff expert for treatment, given this will be your third positive result  -------------Pending c diff treatment, may need to reconsider IBD treatment plan  ---If we need to move from vedolizumab to another biologic, possibilities would be Stelara, Cimzia and weekly Humira   --Referral to [facility] pharmacy for healthcare maintenance

## 2020-05-19 NOTE — PROGRESS NOTES
"Natalie Villa is a 29 year old female who is being evaluated via a billable video visit.      The patient has been notified of following:     \"This video visit will be conducted via a call between you and your physician/provider. We have found that certain health care needs can be provided without the need for an in-person physical exam.  This service lets us provide the care you need with a video conversation.  If a prescription is necessary we can send it directly to your pharmacy.  If lab work is needed we can place an order for that and you can then stop by our lab to have the test done at a later time.    Video visits are billed at different rates depending on your insurance coverage.  Please reach out to your insurance provider with any questions.    If during the course of the call the physician/provider feels a video visit is not appropriate, you will not be charged for this service.\"    Patient has given verbal consent for Video visit? Yes    How would you like to obtain your AVS? Laura    Patient would like the video invitation sent by: Text to cell phone: [phone]    Will anyone else be joining your video visit? No      GI CLINIC VISIT - ESTABLISHED PATIENT    CC/REFERRING PROVIDER: Referred Self  REASON FOR CONSULTATION: IBD follow up    HPI: 28 year old female with history of Crohn's vs UC on Vedolizumab (failed adalimumab, and questionable antibodies to infliximab, now on vedolizumab since 6/19/2015) + AZA, initially diagnosed with Crohn's in 2013. Her symptoms at the time were bloody diarrhea, left sided abdominal pain and general fatigue. She was treated with Humira for 1 year but stopped after colonoscopy showed ongoing disease. Then was treated with Remicade for about 18 months but per patient report, thinks she developed antibodies to it. She was started on Vedolizumab in June of 2015 initially at 300 mg Q8 weeks which was increased to Q6 weeks and mostly recently to Q4 weeks in May 2018 " after colonoscopy in April 2018 showed ongoing colitis. She has been on AZA since diagnosis, initially at 200 mg Qday which has been titrated down to 100 mg Qday.     She had her first occurrence of C. difficile infection after the stool studies were completed after last visit in October 2018.  She completed a vancomycin taper.  Second occurrence (spontaneous without abx trigger) treated with vancomycin QID x 10 days then daily. Fecal esvin elevated to ~300, repeat cscope at that time showed Borrego 1 in descending, sigmoid and rectum.     She is currently having an average of 4 stools per day that are formed.  She has started running again, and occasionally noticed stools can be more loose.  She had one incidence of blood in her stool in January without any associated symptoms.  Due to a job change, she had to miss her January infusion with vedolizumab.  She noticed blood in her stool around this time after missing her dose.    Patient has had a significant amount of changes that have contributed to increased anxiety and some depression.  She changed jobs at the end of 2019, she moved in with her boyfriend, and some stresses with family dynamics.  With these changes she is noticed increased weight gain, increased alcohol consumption and increased fatigue despite 9 hours of sleep per night.  With the change in jobs, she now works from home, and she finds it difficult to maintain a schedule and remain active.  She also notes she was a week late in her menstrual cycle around the time of when her infusion was due.    Some increased pruritus in scalp that improved with restart of Selsun Blue shampoo.  She notes a questionable new allergy to shellfish with some itchiness in her throat after eating sushi recently.  No joint manifestations.    She also continues to follow with hematology as she was diagnosed with a LLE DVT fall 2018 that was thought to be provoked and completed therapy with Eliquis.  She will have repeat labs  "this spring with follow up and determine if IV iron infusions are required. Most recently, hemoglobin normal 2/14/20.    Interval history, 5/2020 (virtual visit)  Continues on VDZ q4w + AZA 75 mg BID. Last infusion 5/8/2020 (4/10/2020). Of note, CRP was 25.   Had met with Dr. Obrien. Tried the Whole 30 diet Mar-Apr. Stool was inconsistent at that time in the s/o increased fiber use (had loose stools up to 7 x per day). Stool normalized after liberalizing diet.     Using \"GI monitor\" brandon. Having ~4 BMs per day. Minimal urgency, mixed consistency stool, no blood. No abd pain.     Reports feeling well compared to the past. Seems to be inconsistent with recent CRP elevation.     ROS: 10pt ROS performed and otherwise negative.    PERTINENT PAST MEDICAL HISTORY:  As noted above.    PREVIOUS ABDOMINAL/GYNECOLOGIC SURGERIES:  None     PREVIOUS ENDOSCOPY:  Colonoscopy 4/23/2018 at [facility]: Mild colitis starting at the transverse colon (Borrego 1), progressing to more moderate colitis in the rectosigmoid (Borrego 2). Bx showed chronic colitis. TI and right colon were unremarkable endoscopically and bx showed mild chronic activity.     Flex sig 10/2/2018 shows Borrego 2/3, continuous, biopsies show moderate chronic active colitis.    Colonoscopy 5/2019 shows  Borrego 1 in descending, sigmoid and rectum. Bx show mild active chronic colitis with cryptitis and focal erosion.     PERTINENT MEDICATIONS:  - Azathioprine 75 mg BID  - Vedolizumab 300 mg Q4W (last dose 9/21/18)  - [facility]   Medications reviewed with patient today, see Medication List/Assessment for details.  No other NSAID/anticoagulation reported by patient.  No other OTC/herbal/supplements reported by patient.    SOCIAL HISTORY:  - Works as   - Uses alcohol 1-2 times/month   - Smokes marijuana 2-3 times/week  - No tobacco   - Sexually active, uses condoms      FAMILY HISTORY:  No colon/panc/esophageal/other GI CA, no other Valverde or other HPS-related Keo. No " IBD/celiac, no other AI/liver/thyroid disease. + for asthma and acne.     PHYSICAL EXAMINATION:   General appearance  Healthy appearing adult, in no acute distress     Eyes  Sclera anicteric  Pupils round and reactive to light     Ears, nose, mouth and throat  No obvious external lesions of ears and nose  Hearing intact     Neck  Symmetric  No obvious external lesions     Respiratory  Normal respiration, no use of accessory muscles      MSK  Gait normal     Skin  No rashes or jaundice      Psychiatric  Oriented to person, place and time  Appropriate mood and affect.     ASSESSMENT/PLAN:  Summer has some mild/moderate symptoms of colitis and most recent CRP was 25. I would like to check for active disease and C diff (although she may be a colonizer). We will proceed with the following plan:    # Indeterminate colitis, on vedolizumab + AZA (hx of Humira, Remicade)  # History of DVT  # Recurrent Cdiff infection  ---Check fecal calprotectin for gut-specific inflammation  ---Check stool for C diff, giardia, enteric pathogen, giardia to rule out infection   -------------If c diff is positive again, will refer to C diff expert for treatment, given this will be your third positive result  -------------Pending c diff treatment, may need to reconsider IBD treatment plan  ---If we need to move from vedolizumab to another biologic, possibilities would be Stelara, Cimzia and weekly Humira   - Continue Entyvio 300mg IV Q4 weeks (dose adjusted to Q4W in May 2018) for now  - Continue AZA 75 BID  - Labs with infusions to include LFT, CBC ESR/CRP    # LLE DVT:   thought to be provoked (factors include prolonged car ride from [place] to [place] when moved here this summer, strain to gastrocnemius muscle on crutches for 6 weeks, on birth control and active IBD) and is currently on Eliquis.  Concern is of course for ongoing active IBD, leading to a hypercoagulable state.  We will continue to work to optimize IBD regimen as stated above.   Patient has now completed anticoagulation therapy.  She discontinued oral contraceptives. Will not favor tofacitinib has a therapy given DVT history.    # IBD health maintenance   --Referral to [facility] pharmacy    Vaccinations:  -- Influenza (every year): Last given 2019  -- TdaP (every 10 years): Last given 2014  -- Pneumococcal Pneumonia (once then every 5 years): Last given 2015  -- Yearly assessment for latent Tb (verbal screening and exam, PPD or QuantiFERON-Tb testing): Indeterminate due to anergy 2017     One time confirmation of immunity or serologies:  -- Hepatitis A (serologies or immunizations): Unknown  -- Hepatitis B (serologies or immunizations): Vaccinated  -- Varicella: had chicken pox as a child  -- MMR: [place]   -- HPV (all aged 18-26): Up to date  -- Meningococcal meningitis (all patients at risk for meningitis): Unknown  -- Due to the immunosuppression in this patient, I would not advise administration of live vaccines such as varicella/VZV, intranasal influenza, MMR, or yellow fever vaccine (if travelling).      Bone mineral density screening   -- DEXA WNL 2015    Cancer Screening:  Colon cancer screening: due 2020  Cervical cancer screening: Annual due to immunosupression    PHYSICAL EXAMINATION:  General appearance  Healthy appearing adult, in no acute distress     Eyes  Sclera anicteric  Pupils round and reactive to light     Ears, nose, mouth and throat  No obvious external lesions of ears and nose  Hearing intact     Neck  Symmetric  No obvious external lesions     Respiratory  Normal respiration, no use of accessory muscles      MSK  Gait normal     Skin  No rashes or jaundice      Psychiatric  Oriented to person, place and time  Appropriate mood and affect.     PERTINENT STUDIES:  CBC RESULTS:   Recent Labs   Lab Test 02/14/20  1505   WBC 7.0   RBC 3.76*   HGB 11.7   HCT 36.4   MCV 97   MCH 31.1   MCHC 32.1   RDW 12.0        CRP Inflammation   Date Value Ref Range Status   05/08/2020  25.1 (H) 0.0 - 8.0 mg/L Final     RTC in 6 weeks     Video-Visit Details    Type of service:  Video Visit    Video Start Time: 12:51 PM  Video End Time: 1:45 PM    Originating Location (pt. Location): Home    Distant Location (provider location):  [facility]     Platform used for Video Visit: Robbi Berger MD

## 2020-05-20 ENCOUNTER — PATIENT OUTREACH (OUTPATIENT)
Dept: GASTROENTEROLOGY | Facility: CLINIC | Age: 30
End: 2020-05-20

## 2020-05-20 NOTE — PROGRESS NOTES
Patient called with questions regarding ehr FCP testing.  Patient will call the lab and make an appointment to drop of the sample when it is completed.

## 2020-05-22 ENCOUNTER — ALLIED HEALTH/NURSE VISIT (OUTPATIENT)
Dept: PHARMACY | Facility: CLINIC | Age: 30
End: 2020-05-22
Payer: COMMERCIAL

## 2020-05-22 DIAGNOSIS — G47.00 INSOMNIA, UNSPECIFIED TYPE: ICD-10-CM

## 2020-05-22 DIAGNOSIS — K52.3 INDETERMINATE COLITIS: Primary | ICD-10-CM

## 2020-05-22 DIAGNOSIS — J45.990 EXERCISE-INDUCED ASTHMA: ICD-10-CM

## 2020-05-22 PROCEDURE — 99607 MTMS BY PHARM ADDL 15 MIN: CPT | Performed by: PHARMACIST

## 2020-05-22 PROCEDURE — 99605 MTMS BY PHARM NP 15 MIN: CPT | Performed by: PHARMACIST

## 2020-05-22 NOTE — Clinical Note
Lee Berger,    During our discussion, Summer expressed interest in having her Hep B serologies completed along with a tuberculosis screening (which was indeterminate in 2017 due to anergy). I had mentioned you may consider these if she were to return to an anti-TNF and she would prefer to do them now. Would you be agreeable to this?     Thank you,  Vania

## 2020-05-22 NOTE — PROGRESS NOTES
MTM ENCOUNTER  SUBJECTIVE/OBJECTIVE:                           Natalie Villa is a 29 year old female called for an initial visit. She was referred to me from Dr. Berger.      Patient consented to a telehealth visit: yes  Telemedicine Visit Details  Type of service:  Telephone visit  Start Time: 3:00 PM  End Time: 4:00 PM  Originating Location (pt. Location): Home  Distant Location (provider location):  [facility] MTM  Mode of Communication:  Telephone    Chief Complaint: Health maintenance review in setting of Entyvio and azathioprine    Allergies/ADRs: Reviewed in Epic  Tobacco:  reports that she has never smoked. She has never used smokeless tobacco.  Alcohol: not currently using    Medication Adherence/Access: no issues reported    Indeterminate colitis:   Azathioprine 75 mg BID  Calcium carbonate 500 mg daily  VSL#3 BID  Entyvio 300 mg every 28 days  Vitamin D 2000 units daily    Reports taking calcium 600 mg daily along with 50 mcg of vitamin D. She reports not getting much calcium in her diet. Mentions CBD tincture that she saw in IBD group, and we discuss the data surrounding this today. She did have some symptoms after smoking marijuana after she hadn't in a long time, and wondered if there was a connection. She uses acetaminophen if needed for pain. She recently met with Dr. Berger for a virtual visit on 5/19. Plan to continue current doses of Entyvio and azathioprine with workup for infectious diarrhea. There is some discussion of switching to another biologic such as Stelara, weekly Humira, or Cimzia.    Natalie had many questions today regarding her medications. We discussed various topics including infection risk of various biologics and therapeutic drug monitoring of both thiopurines (since she is on azathioprine) and biologics such as Entyvio. She did have thiopurine metabolites drawn 4/2019 when her 6-TGN was reported at 356 and 6-MMP at 817. We reviewed the significance of these levels  today. I do not see a recent Entyio level on file, but she states that this could have been increased around the time she moved from [place]. Per notes, she was having ongoing colitis on Q6 week dosing of Entyvio. She notes that Dr. Berger has considered her dose optimized for Entyvio.     She has no further questions at the end of our visit today.    Vitamin D Deficiency Screening Results:  Lab Results   Component Value Date    VITDT 33 07/09/2018       IBD Health Care Maintenance:    Vaccinations:  All patients on biologics should avoid live vaccines.    -- Influenza (every year) up-to-date for 19-20 season, 12/19/2019  -- TdaP (every 10 years) last 8/14/2012  -- Pneumococcal Pneumonia (once plus booster at 5 years)   - Prevnar-13 2015   - Pneumovax-23 2013     -- Yearly assessment for latent Tb (verbal screening and exam, PPD or QuantiFERON-Tb testing)   - reports indeterminate 2017    One time confirmation of immunity or serologies:  -- Hepatitis A (serologies or immunizations) unknown  -- Hepatitis B (serologies or immunizations) dose 3/3 2002, no serologies on file  -- Varicella had chicken pox as a child  -- MMR dose #2 2002  -- HPV (all aged 18-26) completed 2007    Due to the immunosuppression in this patient, I would not advise administration of live vaccines such as varicella/VZV, intranasal influenza, MMR, or yellow fever vaccine (if traveling).      Bone mineral density screening   -- Recommend all patients supplement with calcium and vitamin D  -- DEXA WNL 2015    Cancer Screening:  Colon cancer screening: due 2020    Cervical cancer screening: Annual due to immunosuppression   -due for this    Skin cancer screening: Annual visual exam of skin by dermatologist since patient is immunocompromised   -reports getting this done with Dr. Wright (12/2018), due for re-check    Depression Screening:  -- Over the last month, have you felt down, depressed, or hopeless? No (but yes, regarding Covid)  -- Over the  last month, have you felt little interest or pleasure doing things? No    Misc:  -- Avoid tobacco use  -- Avoid NSAIDs as there is potentially a 25% chance of causing an IBD flare    Exercised Induced Asthma:   Albuterol HFA PRN    Reports only using this for sports induced asthma. Is effective when needed. She has not been using this as much right now due to coronavirus pandemic as she is not quite as active.    ACT Total Scores 12/19/2019   ACT TOTAL SCORE (Goal Greater than or Equal to 20) 23   In the past 12 months, how many times did you visit the emergency room for your asthma without being admitted to the hospital? 0   In the past 12 months, how many times were you hospitalized overnight because of your asthma? 0     Insomnia:     She did try melatonin because she was having difficulty sleeping with her old job. She was taking 25 mg nightly and ended up quitting her job and she has not needed this.    ASSESSMENT:                            Medication Adherence: good, no issues identified    Indeterminate Colitis: Needs improvement. She would likely benefit from a Pneumovax-23 booster given current combination therapy of Entyvio and azathioprine, with tentative plan for anti-TNF or Stelara. Could also consider hepatitis A vaccination at the same time. She did complete the hepatitis B vaccine series, but would be interested in getting serologic testing which is reasonable given her immunosuppressive medications. She would also be interested in re-checking her quantiferon which I will discuss with Dr. Berger. She is due for her annual screenings including skin check and gynecologic exam. In light of COVID-19, she may not be able to get these done in the next month or so, but should keep them on her radar.     Exercised  Induced Asthma: Stable. Last ACT > 20.    Insomnia: Stable. We reviewed that melatonin can have a paradoxical effect, especially at that high of a dose. Encouraged her to re-trial 3-6 mg nightly  if needed going forward.    PLAN:                            1. Summer to limit melatonin to 3-6 mg nightly if needed  2. Consider Pneumovax-23 booster/Hepatitis A vaccine  3. Vania to discuss hepatitis B serology/quantiferon with Dr. Berger per patient preference  4. Summer to get yearly skin check and yearly gynecologic exam as planned when able    I spent 60 minutes with this patient today. I offer these suggestions for consideration by her care team. A copy of the visit note was provided to the patient's referring provider.    Will follow up in 1 year for health maintenance review, sooner if needed.    The patient was sent via [employer] a summary of these recommendations.     Vania Monteiro PharmD, BCACP  MTM Pharmacist   [facility] [place] [facility]  Phone: [phone]

## 2020-05-28 DIAGNOSIS — K50.10 CROHN'S DISEASE OF LARGE INTESTINE WITHOUT COMPLICATION (H): ICD-10-CM

## 2020-05-28 NOTE — PATIENT INSTRUCTIONS
Recommendations from today's MTM visit:                                                    MTM (medication therapy management) is a service provided by a clinical pharmacist designed to help you get the most of out of your medicines.   Today we reviewed what your medicines are for, how to know if they are working, that your medicines are safe and how to make your medicine regimen as easy as possible.     1. If you choose to try melatonin again, consider limiting the dose to 3-6 mg nightly. Doses as high as you were taking before can have the opposite effect.    2. Consider getting a pneumovax-23 booster since it has been > 5 years since your last dose. After that, your pneumonia vaccines will be up-to-date until age 65 (based on current guidelines).    3. Consider the hepatitis A vaccine with your pneumovax-23 booster.    4. I will discuss checking your hepatitis B serologies and tuberculosis screening with Dr. Berger.    5. Reminder to get your annual skin check and gynecologic exam due to immunosuppression completed when able.    It was great to speak with you today.  I value your experience and would be very thankful for your time with providing feedback on our clinic survey. You may receive a survey via email or text message in the next few days.     Next MTM visit: 1 year for health maintenance review, sooner if needed    To schedule another MTM appointment, please call the clinic directly or you may call the MTM scheduling line at [phone] or toll-free at [phone].     My Clinical Pharmacist's contact information:                                                      It was a pleasure talking with you today!  Please feel free to contact me with any questions or concerns you have.      Vania Monteiro PharmD, BCACP  MTM Pharmacist   [facility]  Phone: [phone]

## 2020-05-29 LAB — CALPROTECTIN STL-MCNT: 443 MG/KG (ref 0–49.9)

## 2020-06-04 ENCOUNTER — TELEPHONE (OUTPATIENT)
Dept: GASTROENTEROLOGY | Facility: CLINIC | Age: 30
End: 2020-06-04

## 2020-06-04 DIAGNOSIS — K50.10 CROHN'S DISEASE OF LARGE INTESTINE WITHOUT COMPLICATION (H): Primary | ICD-10-CM

## 2020-06-04 NOTE — TELEPHONE ENCOUNTER
----- Message from Guero Arias PA-C sent at 6/3/2020  2:24 PM CDT -----  Any word on this?  ----- Message -----  From: Guero Arias PA-C  Sent: 6/1/2020   8:38 AM CDT  To: Lucia Berger MD, Sangeeta Singh, RN    Good morning all,    I just received a fecal esvin back from Summer which was >400.  I know Lucia's plan was to also check stool for infection, but I do not see that those are pending.  Sangeeta, do you mind following up to see if those will still be run?  Pending stools studies and given high fecal esvin would plan be then to scope?    --Guero

## 2020-06-04 NOTE — TELEPHONE ENCOUNTER
Called Summer to discuss how she has  Been doing.  She states that she feels stressed and is not sleeping well due to the pandemic and riots but otherwise is feeling quite well.      Biggest thing that has changed is that she was on the elimination diet with , no gluten, sulfates, grains,  Diary.  But has now added all the foods back into her diet without difficulty. She stools 3-4 times and day  no urgency solid soft  stools no blood     Patient will submit the giardia and enteric panel stool samples tomorrow when she goes in for her infusion.

## 2020-06-05 ENCOUNTER — INFUSION THERAPY VISIT (OUTPATIENT)
Dept: INFUSION THERAPY | Facility: CLINIC | Age: 30
End: 2020-06-05
Attending: INTERNAL MEDICINE
Payer: COMMERCIAL

## 2020-06-05 ENCOUNTER — HOSPITAL ENCOUNTER (OUTPATIENT)
Facility: CLINIC | Age: 30
Setting detail: SPECIMEN
End: 2020-06-05
Attending: INTERNAL MEDICINE
Payer: COMMERCIAL

## 2020-06-05 VITALS
TEMPERATURE: 97.6 F | SYSTOLIC BLOOD PRESSURE: 105 MMHG | DIASTOLIC BLOOD PRESSURE: 63 MMHG | OXYGEN SATURATION: 100 % | HEART RATE: 65 BPM | RESPIRATION RATE: 16 BRPM

## 2020-06-05 DIAGNOSIS — K50.10 CROHN'S DISEASE OF LARGE INTESTINE WITHOUT COMPLICATION (H): Primary | ICD-10-CM

## 2020-06-05 PROCEDURE — 25000128 H RX IP 250 OP 636: Performed by: PHYSICIAN ASSISTANT

## 2020-06-05 PROCEDURE — 25800030 ZZH RX IP 258 OP 636: Performed by: PHYSICIAN ASSISTANT

## 2020-06-05 PROCEDURE — 96365 THER/PROPH/DIAG IV INF INIT: CPT

## 2020-06-05 RX ADMIN — VEDOLIZUMAB 300 MG: 300 INJECTION, POWDER, LYOPHILIZED, FOR SOLUTION INTRAVENOUS at 09:27

## 2020-06-05 NOTE — PROGRESS NOTES
Infusion Nursing Note:  Natalie Villa presents today for Entyvio.    Patient seen by provider today: No   present during visit today: Not Applicable.    Note:Patient due for labs with next treatment..    Intravenous Access:  Peripheral IV placed.    Treatment Conditions:  Biological Infusion Checklist:  ~~~ NOTE: If the patient answers yes to any of the questions below, hold the infusion and contact ordering provider or on-call provider.    1. Have you recently had an elevated temperature, fever, chills, productive cough, coughing for 3 weeks or longer or hemoptysis, abnormal vital signs, night sweats,  chest pain or have you noticed a decrease in your appetite, unexplained weight loss or fatigue? No  2. Do you have any open wounds or new incisions? No  3. Do you have any recent or upcoming hospitalizations, surgeries or dental procedures? No  4. Do you currently have or recently have had any signs of illness or infection or are you on any antibiotics? No  5. Have you had any new, sudden or worsening abdominal pain? No  6. Have you or anyone in your household received a live vaccination in the past 4 weeks? Please note:  No live vaccines while on biologic/chemotherapy until 6 months after the last treatment.  Patient can receive the flu vaccine (shot only) and the pneumovax.  It is optimal for the patient to get these vaccines mid cycle, but they can be given at any time as long as it is not on the day of the infusion. No  7. Have you recently been diagnosed with any new nervous system diseases (ie. Multiple sclerosis, Guillain [place], seizures, neurological changes) or cancer diagnosis? No  8. Are you on any form of radiation or chemotherapy? No  9. Are you pregnant or breast feeding or do you have plans of pregnancy in the future? No  10. Have you been having any signs of worsening depression or suicidal ideations?  (benlysta only) No  11. Have there been any other new onset medical symptoms?  No        Post Infusion Assessment:  Patient tolerated infusion without incident.  Blood return noted pre and post infusion.  Site patent and intact, free from redness, edema or discomfort.  No evidence of extravasations.  Access discontinued per protocol.  Biologic Infusion Post Education: Call the triage nurse at your clinic or seek medical attention if you have chills and/or temperature greater than or equal to 100.5, uncontrolled nausea/vomiting, diarrhea, constipation, dizziness, shortness of breath, chest pain, heart palpitations, weakness or any other new or concerning symptoms, questions or concerns.  You cannot have any live virus vaccines prior to or during treatment or up to 6 months post infusion.  If you have an upcoming surgery, medical procedure or dental procedure during treatment, this should be discussed with your ordering physician and your surgeon/dentist.  If you are having any concerning symptom, if you are unsure if you should get your next infusion or wish to speak to a provider before your next infusion, please call your care coordinator or triage nurse at your clinic to notify them so we can adequately serve you.       Discharge Plan:   Patient declined prescription refills.  Copy of AVS reviewed with patient and/or family.  Patient will return 7/3 for next appointment.  Patient discharged in stable condition accompanied by: self.  Departure Mode: Ambulatory.    Laverne Og RN

## 2020-06-17 NOTE — RESULT ENCOUNTER NOTE
Hi Summer,    Your stool sample indicates active inflammation.  I know you spoke with Sangeeta regarding symptoms, and it seems that things were stable.  I am still concerned with inflammation levels this high.  I would recommend to complete the stool studies for any other possible infectious problems, then my recommendation would be to complete a scope again to see what is going on.  Sometimes clinical symptoms are not the best indicator of what is going on which is why it is so important to take a look. Please call us with an update [phone].    Thanks!    Guero

## 2020-06-24 ENCOUNTER — PATIENT OUTREACH (OUTPATIENT)
Dept: GASTROENTEROLOGY | Facility: CLINIC | Age: 30
End: 2020-06-24

## 2020-06-24 DIAGNOSIS — A04.71 RECURRENT CLOSTRIDIOIDES DIFFICILE DIARRHEA: Primary | ICD-10-CM

## 2020-06-24 DIAGNOSIS — K52.3 INDETERMINATE COLITIS: ICD-10-CM

## 2020-06-24 DIAGNOSIS — R19.7 DIARRHEA, UNSPECIFIED TYPE: ICD-10-CM

## 2020-06-24 RX ORDER — VANCOMYCIN HYDROCHLORIDE 125 MG/1
125 CAPSULE ORAL DAILY
Qty: 28 CAPSULE | Refills: 3 | Status: SHIPPED | OUTPATIENT
Start: 2020-06-24 | End: 2020-07-21

## 2020-06-24 RX ORDER — VANCOMYCIN HYDROCHLORIDE 125 MG/1
125 CAPSULE ORAL 4 TIMES DAILY
Qty: 56 CAPSULE | Refills: 0 | Status: SHIPPED | OUTPATIENT
Start: 2020-06-24 | End: 2020-07-08

## 2020-06-25 LAB
C PARVUM AG STL QL IA: NEGATIVE
G LAMBLIA AG STL QL IA: NEGATIVE
SPECIMEN SOURCE: NORMAL

## 2020-07-03 ENCOUNTER — HOSPITAL ENCOUNTER (OUTPATIENT)
Facility: CLINIC | Age: 30
Setting detail: SPECIMEN
Discharge: HOME | End: 2020-07-03
Attending: INTERNAL MEDICINE | Admitting: PHYSICIAN ASSISTANT
Payer: COMMERCIAL

## 2020-07-03 ENCOUNTER — INFUSION THERAPY VISIT (OUTPATIENT)
Dept: INFUSION THERAPY | Facility: CLINIC | Age: 30
End: 2020-07-03
Attending: INTERNAL MEDICINE
Payer: COMMERCIAL

## 2020-07-03 VITALS
OXYGEN SATURATION: 100 % | RESPIRATION RATE: 16 BRPM | HEART RATE: 68 BPM | SYSTOLIC BLOOD PRESSURE: 104 MMHG | DIASTOLIC BLOOD PRESSURE: 69 MMHG | TEMPERATURE: 98 F

## 2020-07-03 DIAGNOSIS — K50.10 CROHN'S DISEASE OF LARGE INTESTINE WITHOUT COMPLICATION (H): Primary | ICD-10-CM

## 2020-07-03 LAB
ALBUMIN SERPL-MCNC: 3.7 G/DL (ref 3.4–5)
ALP SERPL-CCNC: 62 U/L (ref 40–150)
ALT SERPL W P-5'-P-CCNC: 12 U/L (ref 0–50)
AST SERPL W P-5'-P-CCNC: 10 U/L (ref 0–45)
BASOPHILS # BLD AUTO: 0.1 10E9/L (ref 0–0.2)
BASOPHILS NFR BLD AUTO: 0.9 %
BILIRUB DIRECT SERPL-MCNC: 0.2 MG/DL (ref 0–0.2)
BILIRUB SERPL-MCNC: 0.6 MG/DL (ref 0.2–1.3)
CRP SERPL-MCNC: <2.9 MG/L (ref 0–8)
DIFFERENTIAL METHOD BLD: ABNORMAL
EOSINOPHIL # BLD AUTO: 0.4 10E9/L (ref 0–0.7)
EOSINOPHIL NFR BLD AUTO: 7.5 %
ERYTHROCYTE [DISTWIDTH] IN BLOOD BY AUTOMATED COUNT: 12.2 % (ref 10–15)
ERYTHROCYTE [SEDIMENTATION RATE] IN BLOOD BY WESTERGREN METHOD: 11 MM/H (ref 0–20)
HCT VFR BLD AUTO: 36.8 % (ref 35–47)
HGB BLD-MCNC: 11.8 G/DL (ref 11.7–15.7)
IMM GRANULOCYTES # BLD: 0 10E9/L (ref 0–0.4)
IMM GRANULOCYTES NFR BLD: 0.5 %
LYMPHOCYTES # BLD AUTO: 1.1 10E9/L (ref 0.8–5.3)
LYMPHOCYTES NFR BLD AUTO: 19.3 %
MCH RBC QN AUTO: 31.5 PG (ref 26.5–33)
MCHC RBC AUTO-ENTMCNC: 32.1 G/DL (ref 31.5–36.5)
MCV RBC AUTO: 98 FL (ref 78–100)
MONOCYTES # BLD AUTO: 0.5 10E9/L (ref 0–1.3)
MONOCYTES NFR BLD AUTO: 8.2 %
NEUTROPHILS # BLD AUTO: 3.6 10E9/L (ref 1.6–8.3)
NEUTROPHILS NFR BLD AUTO: 63.6 %
NRBC # BLD AUTO: 0 10*3/UL
NRBC BLD AUTO-RTO: 0 /100
PLATELET # BLD AUTO: 324 10E9/L (ref 150–450)
PROT SERPL-MCNC: 8 G/DL (ref 6.8–8.8)
RBC # BLD AUTO: 3.75 10E12/L (ref 3.8–5.2)
WBC # BLD AUTO: 5.6 10E9/L (ref 4–11)

## 2020-07-03 PROCEDURE — 85025 COMPLETE CBC W/AUTO DIFF WBC: CPT | Performed by: PHYSICIAN ASSISTANT

## 2020-07-03 PROCEDURE — 25000128 H RX IP 250 OP 636: Performed by: PHYSICIAN ASSISTANT

## 2020-07-03 PROCEDURE — 85652 RBC SED RATE AUTOMATED: CPT | Performed by: PHYSICIAN ASSISTANT

## 2020-07-03 PROCEDURE — 80076 HEPATIC FUNCTION PANEL: CPT | Performed by: PHYSICIAN ASSISTANT

## 2020-07-03 PROCEDURE — 25800030 ZZH RX IP 258 OP 636: Performed by: PHYSICIAN ASSISTANT

## 2020-07-03 PROCEDURE — 96365 THER/PROPH/DIAG IV INF INIT: CPT

## 2020-07-03 PROCEDURE — 36415 COLL VENOUS BLD VENIPUNCTURE: CPT

## 2020-07-03 PROCEDURE — 86140 C-REACTIVE PROTEIN: CPT | Performed by: PHYSICIAN ASSISTANT

## 2020-07-03 RX ADMIN — VEDOLIZUMAB 300 MG: 300 INJECTION, POWDER, LYOPHILIZED, FOR SOLUTION INTRAVENOUS at 08:19

## 2020-07-03 NOTE — PROGRESS NOTES
Infusion Nursing Note:  Natalie Villa presents today for Entyvio.     present during visit today: Not Applicable.    Note: getting treated for C Dif.  Her provider knows she is receiving Entyvio today, per pt.    Intravenous Access:  Lab draw site L AC, Needle type piv, Gauge 24.  Labs drawn without difficulty.  Peripheral IV placed.    Treatment Conditions:  NA    Post Lab Assessment:  Patient tolerated infusion   Blood return noted pre and post infusion.  Site patent and intact, free from redness, edema or discomfort.  No evidence of extravasations.  Access  (discontinued)     Discharge Plan:   Patient and/or family verbalized understanding of  instructions and all questions answered.  Patient  to lobby in stable condition accompanied by: self.  Patient to see provider today: No  Departure Mode: Ambulatory.  Irma Jarrett, RN, RN

## 2020-07-14 ENCOUNTER — PATIENT OUTREACH (OUTPATIENT)
Dept: GASTROENTEROLOGY | Facility: CLINIC | Age: 30
End: 2020-07-14

## 2020-07-14 ENCOUNTER — VIRTUAL VISIT (OUTPATIENT)
Dept: GASTROENTEROLOGY | Facility: CLINIC | Age: 30
End: 2020-07-14
Payer: COMMERCIAL

## 2020-07-14 VITALS — WEIGHT: 171 LBS | BODY MASS INDEX: 28.49 KG/M2 | HEIGHT: 65 IN

## 2020-07-14 DIAGNOSIS — A04.71 RECURRENT CLOSTRIDIOIDES DIFFICILE DIARRHEA: Primary | ICD-10-CM

## 2020-07-14 ASSESSMENT — PAIN SCALES - GENERAL: PAINLEVEL: NO PAIN (0)

## 2020-07-14 ASSESSMENT — MIFFLIN-ST. JEOR: SCORE: 1496.53

## 2020-07-14 NOTE — PATIENT INSTRUCTIONS
-- Continue vancomycin 125mg twice a day    -- In august, obtain fecal calprotectin (stool test for inflammation).  Based on the results of this test, we will determine if we need to optimize the management of your colitis, or move forward with an intestinal microbiota transplant for your C diff infections.

## 2020-07-14 NOTE — NURSING NOTE
"Chief Complaint   Patient presents with     Consult     new consult       Vitals:    07/14/20 0902   Weight: 77.6 kg (171 lb)   Height: 1.651 m (5' 5\")       Body mass index is 28.46 kg/m .    Janessa Booth CMA    "

## 2020-07-14 NOTE — PROGRESS NOTES
"Natalie Villa is a 30 year old female who is being evaluated via a billable video visit.      The patient has been notified of following:     \"This video visit will be conducted via a call between you and your physician/provider. We have found that certain health care needs can be provided without the need for an in-person physical exam.  This service lets us provide the care you need with a video conversation.  If a prescription is necessary we can send it directly to your pharmacy.  If lab work is needed we can place an order for that and you can then stop by our lab to have the test done at a later time.    Video visits are billed at different rates depending on your insurance coverage.  Please reach out to your insurance provider with any questions.    If during the course of the call the physician/provider feels a video visit is not appropriate, you will not be charged for this service.\"    Patient has given verbal consent for Video visit? Yes  How would you like to obtain your AVS? [employer]  Patient would like the video invitation sent by: Text to cell phone: [phone]  Will anyone else be joining your video visit? No        Video-Visit Details    Type of service:  Video Visit    Video Start Time: 9:20 AM  Video End Time: 10:00 AM    Originating Location (pt. Location): Home    Distant Location (provider location):  [facility]     Platform used for Video Visit: Robbi Christie MD        Gastroenterology Consultation  Natalie Villa [id number] 30 year old 1990    Reason for consult: Recurrent C diff infections   Requesting physician: No att. providers found             Chief Complaint:   C. Diff infection         HPI:   Natalie Villa is a 30 year old year old female with a PMH significant for Crohn's vs UC (diagonsed 2013) on Vedolizumab + AZA who is referred for multiple episodes of C. Diff infection.     Her first episode of C.diff was 10/2018. She notes " during this time she had strained a muscle and was on pain medication and then diagnosed with a DVT but does not recall any antibiotics for inrection. She was treated with vancomycin with improvement in symptoms. She again presented with C.diff in March 2019. She notes her typical symptoms include increase in stool frequency and generalized fatigue. She has not had fever, leukocytosis or abdominal pain. In March 2019 she was treated with vancomycin taper. She notes a few months later her sister also had C.diff infection; they live together and her sister has no GI medical issues.    She did well up until May 2020 when she saw Dr. Berger for IBD follow up and noted increase in loose stools and elevated CRP. She again felt fatigued but did not have fevers or abdominal pain. She had stools studies at end of June and was positive for C.diff again. She has completed 2 weeks of vancomycin QID and is now on daily oral vancomycin therapy. Prior to starting vancomycin she was having 7-8 loose stools daily with occasional blood. After starting vacomycin she is having 4 formed stools daily which is near her baseline. She notes her last flare of IBD was in March of this year. She continues on Entyvio infusions with last on 7/3/20.    On review of medications she has not had any antibiotics prescribed prior to these C.diff infections and she denies any antibiotics from other providers.            Past Medical History:   - Indeterminate colitis   - DVT of left lower extremity  - Herpes zoster   - exercise induced asthma  - Microcytic anemia   - Vitamin D deficiency          Past Surgical History:   None         Medications:     Current Outpatient Medications   Medication Sig     acetaminophen (TYLENOL) 325 MG tablet Take 325-650 mg by mouth every 6 hours as needed for mild pain     albuterol (PROAIR HFA/PROVENTIL HFA/VENTOLIN HFA) 108 (90 Base) MCG/ACT inhaler Inhale 2 puffs into the lungs every 6 hours     azaTHIOprine (IMURAN)  "50 MG tablet Take 1.5 tablets (75 mg) by mouth 2 times daily     calcium carbonate (OS-ELIEL) 500 MG tablet Take 1 tablet by mouth daily      vancomycin (VANCOCIN) 125 MG capsule Take 1 capsule (125 mg) by mouth daily Start once a day after 14 days of vanco 4 times a day. Continue until told to stop     vedolizumab (ENTYVIO) 60 MG/ML injection Inject 300 mg into the vein every 28 days      Vitamin D, Cholecalciferol, 1000 units CAPS Take 2 tablets by mouth daily      No current facility-administered medications for this visit.             Allergies:     Allergies   Allergen Reactions     Sulfa Drugs Nausea     Gi upset            Social History:   - Works as   - Sober from alcohol for past 4 months   - Smokes marijuana 2-3 times/week  - No tobacco           Family History:   Denies family history of IBD, celiac or other autoimmune conditions. No family history of colon, biliary or pancreatic cancers.          Review of Systems:   A complete 10 point review of systems was obtained.  Please see the HPI for pertinent positives and negatives.  All other systems were reviewed and were found to be negative.          Physical Exam:   VS:  Ht 1.651 m (5' 5\")   Wt 77.6 kg (171 lb)   BMI 28.46 kg/m      Virtual visit exam  General appearance: Healthy appearing adult, in no acute distress  HEENT: scleral anicteric, no obvious external lesions of ears and nose. Hearing intact  Neck: Symmetric. No obvious external lesions  Respiratory: Normal respiration, no use of accessory muscles. Speaking in full sentences  Skin:No rashes or jaundice of exposed skin  Neuro:  Oriented to person, place and time. Fluent speech, answers questions appropriately   Psychiatric: Appropriate mood and affect.         Data:   7/3/20 Labs  LFT within normal limits  Hgb 11.8, MCV 98, WBC 5.9    C.diff PCR  +: 6/24/20, 3/9/2019, and 10/26/2018             Impression:     Natalie Villa is a 30 year old year old female with a PMH significant for " Crohn's vs UC (jewel 2013) on Vedolizumab + AZA who is referred for multiple episodes of C. Diff infections (10/2018, 3/2019, 6/2020).        Patient is complex with IBD and c.diff as possible etiologies for symptoms and elevated inflammatory markers. With previous positive C.diff PCRs she has had elevation in CRP and fecal calprotectin however she has never had negative C.dif PCR for additional comparison. Improvement in symptoms and CRP on vancomycin suggests she may have true infection rather than colonization. We will plan to continue suppressive vancomycin therapy for 6 weeks and then repeat fecal calprotectin to evaluate level of inflammation. If improvement, this would favor true C.dif infection rather than colonization. If calprotectin remains elevated, this would suggest IBD is driving more of the inflammation.            Recommendations:   - Continue oral vancomycin BID for 6 weeks   - After 6 weeks of suppressive therapy, we will plan to repeat fecal calprotectin to monitor if inflammation has improved on vancomycin.    - If fecal calprotectin has normalized, this would suggest true reinfection of C.diff and she would warrant possible fecal transplant.    - If calprotectin remains elevated, it is more like IBD driving inflammation and would recommend colonoscopy and adjustment of IBD therapy.       Patient's case discussed with GI attending, Dr. Dodson.    Austin Christie MD   Internal Medicine-PGY3

## 2020-07-14 NOTE — PROGRESS NOTES
Contacted pt to discuss the plan.   Pt aware to take  Vancomycin twice a day until told to stop  calprotectin  in August  Container  will be sent to patient.   Patient has plenty of refills on vancomycin  Pt has no further questions.

## 2020-07-14 NOTE — LETTER
7/14/2020         RE: Natalie Villa  [address]  [facility] [address]        Dear Colleague,    Thank you for referring your patient, Natalie Villa, to the [facility] [place] [facility]. Please see a copy of my visit note below.    Natalie Villa is a 30 year old female who is being evaluated via a billable video visit.        Video-Visit Details    Type of service:  Video Visit    Video Start Time: 9:20 AM  Video End Time: 10:00 AM    Originating Location (pt. Location): Home    Distant Location (provider location):  [facility] [place]     Platform used for Video Visit: Robbi Christie MD        Gastroenterology Consultation  Natalie Villa [id number] 30 year old 1990    Reason for consult: Recurrent C diff infections   Requesting physician: No att. providers found             Chief Complaint:   C. Diff infection         HPI:   Natalie Villa is a 30 year old year old female with a PMH significant for Crohn's vs UC (diagonsed 2013) on Vedolizumab + AZA who is referred for multiple episodes of C. Diff infection.     Her first episode of C.diff was 10/2018. She notes during this time she had strained a muscle and was on pain medication and then diagnosed with a DVT but does not recall any antibiotics for inrection. She was treated with vancomycin with improvement in symptoms. She again presented with C.diff in March 2019. She notes her typical symptoms include increase in stool frequency and generalized fatigue. She has not had fever, leukocytosis or abdominal pain. In March 2019 she was treated with vancomycin taper. She notes a few months later her sister also had C.diff infection; they live together and her sister has no GI medical issues.    She did well up until May 2020 when she saw Dr. Berger for IBD follow up and noted increase in loose stools and elevated CRP. She again felt fatigued but did not have fevers or abdominal pain. She had  stools studies at end of June and was positive for C.diff again. She has completed 2 weeks of vancomycin QID and is now on daily oral vancomycin therapy. Prior to starting vancomycin she was having 7-8 loose stools daily with occasional blood. After starting vacomycin she is having 4 formed stools daily which is near her baseline. She notes her last flare of IBD was in March of this year. She continues on Entyvio infusions with last on 7/3/20.    On review of medications she has not had any antibiotics prescribed prior to these C.diff infections and she denies any antibiotics from other providers.            Past Medical History:   - Indeterminate colitis   - DVT of left lower extremity  - Herpes zoster   - exercise induced asthma  - Microcytic anemia   - Vitamin D deficiency          Past Surgical History:   None         Medications:     Current Outpatient Medications   Medication Sig     acetaminophen (TYLENOL) 325 MG tablet Take 325-650 mg by mouth every 6 hours as needed for mild pain     albuterol (PROAIR HFA/PROVENTIL HFA/VENTOLIN HFA) 108 (90 Base) MCG/ACT inhaler Inhale 2 puffs into the lungs every 6 hours     azaTHIOprine (IMURAN) 50 MG tablet Take 1.5 tablets (75 mg) by mouth 2 times daily     calcium carbonate (OS-ELIEL) 500 MG tablet Take 1 tablet by mouth daily      vancomycin (VANCOCIN) 125 MG capsule Take 1 capsule (125 mg) by mouth daily Start once a day after 14 days of vanco 4 times a day. Continue until told to stop     vedolizumab (ENTYVIO) 60 MG/ML injection Inject 300 mg into the vein every 28 days      Vitamin D, Cholecalciferol, 1000 units CAPS Take 2 tablets by mouth daily      No current facility-administered medications for this visit.             Allergies:     Allergies   Allergen Reactions     Sulfa Drugs Nausea     Gi upset            Social History:   - Works as   - Sober from alcohol for past 4 months   - Smokes marijuana 2-3 times/week  - No tobacco           Family History:  "  Denies family history of IBD, celiac or other autoimmune conditions. No family history of colon, biliary or pancreatic cancers.          Review of Systems:   A complete 10 point review of systems was obtained.  Please see the HPI for pertinent positives and negatives.  All other systems were reviewed and were found to be negative.          Physical Exam:   VS:  Ht 1.651 m (5' 5\")   Wt 77.6 kg (171 lb)   BMI 28.46 kg/m      Virtual visit exam  General appearance: Healthy appearing adult, in no acute distress  HEENT: scleral anicteric, no obvious external lesions of ears and nose. Hearing intact  Neck: Symmetric. No obvious external lesions  Respiratory: Normal respiration, no use of accessory muscles. Speaking in full sentences  Skin:No rashes or jaundice of exposed skin  Neuro:  Oriented to person, place and time. Fluent speech, answers questions appropriately   Psychiatric: Appropriate mood and affect.         Data:   7/3/20 Labs  LFT within normal limits  Hgb 11.8, MCV 98, WBC 5.9    C.diff PCR  +: 6/24/20, 3/9/2019, and 10/26/2018             Impression:     Natalie Villa is a 30 year old year old female with a PMH significant for Crohn's vs UC (diagonsed 2013) on Vedolizumab + AZA who is referred for multiple episodes of C. Diff infections (10/2018, 3/2019, 6/2020).        Patient is complex with IBD and c.diff as possible etiologies for symptoms and elevated inflammatory markers. With previous positive C.diff PCRs she has had elevation in CRP and fecal calprotectin however she has never had negative C.dif PCR for additional comparison. Improvement in symptoms and CRP on vancomycin suggests she may have true infection rather than colonization. We will plan to continue suppressive vancomycin therapy for 6 weeks and then repeat fecal calprotectin to evaluate level of inflammation. If improvement, this would favor true C.dif infection rather than colonization. If calprotectin remains elevated, this would " suggest IBD is driving more of the inflammation.            Recommendations:   - Continue oral vancomycin BID for 6 weeks   - After 6 weeks of suppressive therapy, we will plan to repeat fecal calprotectin to monitor if inflammation has improved on vancomycin.    - If fecal calprotectin has normalized, this would suggest true reinfection of C.diff and she would warrant possible fecal transplant.    - If calprotectin remains elevated, it is more like IBD driving inflammation and would recommend colonoscopy and adjustment of IBD therapy.       Patient's case discussed with GI attending, Dr. Dodson.    Austin Christie MD   Internal Medicine-PGY3      Virtual visit for patient conducted via three way video conference with myself, patient and Dr. Christie ([place]).   I reviewed the history and abbreviated physical exam and discussed the management with Dr. Christie ([place]) on 7/14/2020. I reviewed the note and there are no changes to the past medical, family or social history.  A complete 10 point review of systems was obtained. Please see the HPI for pertinent positives and negatives. All other systems were reviewed and were found to be negative. I agree with the documented findings and plan of care as outlined.    Since June 26th, has been on vancomycin (tapered down). Symptoms have improved.     Plan at this point is to continue on suppressive vancomycin for about 6 weeks.  We will then re-check a fecal calprotectin.  If it is normal, that suggests that C diff is driving most of her inflammation.  If it remains elevated, then likely underlying IBD is still active and C diff is either colonization, or superinfection secondary to underlying inflammation.  In that case, likely move forward with long term vancomycin suppression and optimize management of her underlying colitis.      Again, thank you for allowing me to participate in the care of your patient.        Sincerely,        Maninder Dodson MD

## 2020-07-21 ENCOUNTER — PATIENT OUTREACH (OUTPATIENT)
Dept: GASTROENTEROLOGY | Facility: CLINIC | Age: 30
End: 2020-07-21

## 2020-07-21 DIAGNOSIS — K50.10 CROHN'S DISEASE OF LARGE INTESTINE WITHOUT COMPLICATION (H): Primary | ICD-10-CM

## 2020-07-21 DIAGNOSIS — A04.71 RECURRENT CLOSTRIDIOIDES DIFFICILE DIARRHEA: ICD-10-CM

## 2020-07-21 RX ORDER — VANCOMYCIN HYDROCHLORIDE 125 MG/1
125 CAPSULE ORAL 2 TIMES DAILY
Qty: 60 CAPSULE | Refills: 3 | Status: SHIPPED | OUTPATIENT
Start: 2020-07-21 | End: 2020-07-21

## 2020-07-21 RX ORDER — VANCOMYCIN HYDROCHLORIDE 125 MG/1
125 CAPSULE ORAL 2 TIMES DAILY
Qty: 60 CAPSULE | Refills: 3 | Status: SHIPPED | OUTPATIENT
Start: 2020-07-21

## 2020-07-31 ENCOUNTER — INFUSION THERAPY VISIT (OUTPATIENT)
Dept: INFUSION THERAPY | Facility: CLINIC | Age: 30
End: 2020-07-31
Attending: INTERNAL MEDICINE
Payer: COMMERCIAL

## 2020-07-31 VITALS
TEMPERATURE: 97.2 F | DIASTOLIC BLOOD PRESSURE: 62 MMHG | RESPIRATION RATE: 16 BRPM | OXYGEN SATURATION: 100 % | HEART RATE: 71 BPM | SYSTOLIC BLOOD PRESSURE: 97 MMHG

## 2020-07-31 DIAGNOSIS — K50.10 CROHN'S DISEASE OF LARGE INTESTINE WITHOUT COMPLICATION (H): Primary | ICD-10-CM

## 2020-07-31 PROCEDURE — 25800030 ZZH RX IP 258 OP 636: Performed by: PHYSICIAN ASSISTANT

## 2020-07-31 PROCEDURE — 96365 THER/PROPH/DIAG IV INF INIT: CPT

## 2020-07-31 PROCEDURE — 25000128 H RX IP 250 OP 636: Performed by: PHYSICIAN ASSISTANT

## 2020-07-31 RX ADMIN — VEDOLIZUMAB 300 MG: 300 INJECTION, POWDER, LYOPHILIZED, FOR SOLUTION INTRAVENOUS at 08:26

## 2020-07-31 ASSESSMENT — PAIN SCALES - GENERAL: PAINLEVEL: NO PAIN (0)

## 2020-07-31 NOTE — PROGRESS NOTES
Infusion Nursing Note:  Natalie Villa presents today for Enytvio.    Patient seen by provider today: No   present during visit today: Not Applicable.    Note: Patient continues on Vancomycin BID for c-dif. Stools formed. Restarted in June. States provider aware. .    Intravenous Access:  Peripheral IV placed.  Labs due next infusion.     Treatment Conditions:  Biological Infusion Checklist:  ~~~ NOTE: If the patient answers yes to any of the questions below, hold the infusion and contact ordering provider or on-call provider.    1. Have you recently had an elevated temperature, fever, chills, productive cough, coughing for 3 weeks or longer or hemoptysis, abnormal vital signs, night sweats,  chest pain or have you noticed a decrease in your appetite, unexplained weight loss or fatigue? No  2. Do you have any open wounds or new incisions? No  3. Do you have any recent or upcoming hospitalizations, surgeries or dental procedures? No  4. Do you currently have or recently have had any signs of illness or infection or are you on any antibiotics? Yes, Vancomycin for active c-diff in June.  5. Have you had any new, sudden or worsening abdominal pain? No  6. Have you or anyone in your household received a live vaccination in the past 4 weeks? Please note:  No live vaccines while on biologic/chemotherapy until 6 months after the last treatment.  Patient can receive the flu vaccine (shot only) and the pneumovax.  It is optimal for the patient to get these vaccines mid cycle, but they can be given at any time as long as it is not on the day of the infusion. No  7. Have you recently been diagnosed with any new nervous system diseases (ie. Multiple sclerosis, Guillain [place], seizures, neurological changes) or cancer diagnosis? No  8. Are you on any form of radiation or chemotherapy? No  9. Are you pregnant or breast feeding or do you have plans of pregnancy in the future? No  10. Have you been having any signs of  worsening depression or suicidal ideations?  (benlysta only) No  11. Have there been any other new onset medical symptoms? No        Post Infusion Assessment:  Patient tolerated infusion without incident.  Blood return noted pre and post infusion.  Site patent and intact, free from redness, edema or discomfort.  No evidence of extravasations.  Access discontinued per protocol.  Biologic Infusion Post Education: Call the triage nurse at your clinic or seek medical attention if you have chills and/or temperature greater than or equal to 100.5, uncontrolled nausea/vomiting, diarrhea, constipation, dizziness, shortness of breath, chest pain, heart palpitations, weakness or any other new or concerning symptoms, questions or concerns.  You cannot have any live virus vaccines prior to or during treatment or up to 6 months post infusion.  If you have an upcoming surgery, medical procedure or dental procedure during treatment, this should be discussed with your ordering physician and your surgeon/dentist.  If you are having any concerning symptom, if you are unsure if you should get your next infusion or wish to speak to a provider before your next infusion, please call your care coordinator or triage nurse at your clinic to notify them so we can adequately serve you.       Discharge Plan:   Patient declined prescription refills.  Copy of AVS reviewed with patient and/or family.  Patient will return 4 weeks for next appointment.  Patient discharged in stable condition accompanied by: self.  Departure Mode: Ambulatory.    Laverne Og RN

## 2020-08-10 ENCOUNTER — MYC MEDICAL ADVICE (OUTPATIENT)
Dept: GASTROENTEROLOGY | Facility: CLINIC | Age: 30
End: 2020-08-10

## 2020-08-10 DIAGNOSIS — R22.0 SWELLING OF BOTH LIPS: Primary | ICD-10-CM

## 2020-08-13 NOTE — TELEPHONE ENCOUNTER
Contacted patient to see how her lips         Not sure if oral vanco can cause those reactions... ...     Can we have her see derm?     We could change to fidaxomicin daily if it gets any worse

## 2020-08-28 ENCOUNTER — HOSPITAL ENCOUNTER (OUTPATIENT)
Facility: CLINIC | Age: 30
Setting detail: SPECIMEN
Discharge: HOME | End: 2020-08-28
Attending: INTERNAL MEDICINE | Admitting: PHYSICIAN ASSISTANT
Payer: COMMERCIAL

## 2020-08-28 ENCOUNTER — INFUSION THERAPY VISIT (OUTPATIENT)
Dept: INFUSION THERAPY | Facility: CLINIC | Age: 30
End: 2020-08-28
Attending: INTERNAL MEDICINE
Payer: COMMERCIAL

## 2020-08-28 VITALS
TEMPERATURE: 97.4 F | HEART RATE: 65 BPM | DIASTOLIC BLOOD PRESSURE: 71 MMHG | SYSTOLIC BLOOD PRESSURE: 105 MMHG | OXYGEN SATURATION: 99 %

## 2020-08-28 DIAGNOSIS — K50.10 CROHN'S DISEASE OF LARGE INTESTINE WITHOUT COMPLICATION (H): Primary | ICD-10-CM

## 2020-08-28 LAB
ALBUMIN SERPL-MCNC: 3.7 G/DL (ref 3.4–5)
ALP SERPL-CCNC: 51 U/L (ref 40–150)
ALT SERPL W P-5'-P-CCNC: 10 U/L (ref 0–50)
AST SERPL W P-5'-P-CCNC: 10 U/L (ref 0–45)
BASOPHILS # BLD AUTO: 0 10E9/L (ref 0–0.2)
BASOPHILS NFR BLD AUTO: 0.9 %
BILIRUB DIRECT SERPL-MCNC: 0.2 MG/DL (ref 0–0.2)
BILIRUB SERPL-MCNC: 0.9 MG/DL (ref 0.2–1.3)
CRP SERPL-MCNC: <2.9 MG/L (ref 0–8)
DIFFERENTIAL METHOD BLD: ABNORMAL
EOSINOPHIL # BLD AUTO: 0.3 10E9/L (ref 0–0.7)
EOSINOPHIL NFR BLD AUTO: 5.7 %
ERYTHROCYTE [DISTWIDTH] IN BLOOD BY AUTOMATED COUNT: 12 % (ref 10–15)
ERYTHROCYTE [SEDIMENTATION RATE] IN BLOOD BY WESTERGREN METHOD: 9 MM/H (ref 0–20)
HCT VFR BLD AUTO: 37 % (ref 35–47)
HGB BLD-MCNC: 11.9 G/DL (ref 11.7–15.7)
IMM GRANULOCYTES # BLD: 0 10E9/L (ref 0–0.4)
IMM GRANULOCYTES NFR BLD: 0.7 %
LYMPHOCYTES # BLD AUTO: 1 10E9/L (ref 0.8–5.3)
LYMPHOCYTES NFR BLD AUTO: 22.4 %
MCH RBC QN AUTO: 31.6 PG (ref 26.5–33)
MCHC RBC AUTO-ENTMCNC: 32.2 G/DL (ref 31.5–36.5)
MCV RBC AUTO: 98 FL (ref 78–100)
MONOCYTES # BLD AUTO: 0.4 10E9/L (ref 0–1.3)
MONOCYTES NFR BLD AUTO: 8.1 %
NEUTROPHILS # BLD AUTO: 2.8 10E9/L (ref 1.6–8.3)
NEUTROPHILS NFR BLD AUTO: 62.2 %
NRBC # BLD AUTO: 0 10*3/UL
NRBC BLD AUTO-RTO: 0 /100
PLATELET # BLD AUTO: 296 10E9/L (ref 150–450)
PROT SERPL-MCNC: 8 G/DL (ref 6.8–8.8)
RBC # BLD AUTO: 3.77 10E12/L (ref 3.8–5.2)
WBC # BLD AUTO: 4.6 10E9/L (ref 4–11)

## 2020-08-28 PROCEDURE — 25000128 H RX IP 250 OP 636: Performed by: PHYSICIAN ASSISTANT

## 2020-08-28 PROCEDURE — 25800030 ZZH RX IP 258 OP 636: Performed by: PHYSICIAN ASSISTANT

## 2020-08-28 PROCEDURE — 96365 THER/PROPH/DIAG IV INF INIT: CPT

## 2020-08-28 PROCEDURE — 36415 COLL VENOUS BLD VENIPUNCTURE: CPT

## 2020-08-28 PROCEDURE — 85025 COMPLETE CBC W/AUTO DIFF WBC: CPT | Performed by: PHYSICIAN ASSISTANT

## 2020-08-28 PROCEDURE — 86140 C-REACTIVE PROTEIN: CPT | Performed by: PHYSICIAN ASSISTANT

## 2020-08-28 PROCEDURE — 85652 RBC SED RATE AUTOMATED: CPT | Performed by: PHYSICIAN ASSISTANT

## 2020-08-28 PROCEDURE — 80076 HEPATIC FUNCTION PANEL: CPT | Performed by: PHYSICIAN ASSISTANT

## 2020-08-28 RX ADMIN — VEDOLIZUMAB 300 MG: 300 INJECTION, POWDER, LYOPHILIZED, FOR SOLUTION INTRAVENOUS at 09:01

## 2020-08-28 NOTE — PROGRESS NOTES
Infusion Nursing Note:  Natalie JACOB Villa presents today for Entyvio and labs  Patient seen by provider today: No   present during visit today: Not Applicable.    Note: N/A.    Intravenous Access:  Labs drawn without difficulty.  Peripheral IV placed.    Treatment Conditions:  Biological Infusion Checklist:  ~~~ NOTE: If the patient answers yes to any of the questions below, hold the infusion and contact ordering provider or on-call provider.    1. Have you recently had an elevated temperature, fever, chills, productive cough, coughing for 3 weeks or longer or hemoptysis, abnormal vital signs, night sweats,  chest pain or have you noticed a decrease in your appetite, unexplained weight loss or fatigue? No  2. Do you have any open wounds or new incisions? No  3. Do you have any recent or upcoming hospitalizations, surgeries or dental procedures? No  4. Do you currently have or recently have had any signs of illness or infection or are you on any antibiotics? Yes, PO Vancomycin past two months; CDiff to be reassessed next week  5. Have you had any new, sudden or worsening abdominal pain? No  6. Have you or anyone in your household received a live vaccination in the past 4 weeks? Please note:  No live vaccines while on biologic/chemotherapy until 6 months after the last treatment.  Patient can receive the flu vaccine (shot only) and the pneumovax.  It is optimal for the patient to get these vaccines mid cycle, but they can be given at any time as long as it is not on the day of the infusion. No  7. Have you recently been diagnosed with any new nervous system diseases (ie. Multiple sclerosis, Guillain [place], seizures, neurological changes) or cancer diagnosis? No  8. Are you on any form of radiation or chemotherapy? No  9. Are you pregnant or breast feeding or do you have plans of pregnancy in the future? No  10. Have you been having any signs of worsening depression or suicidal ideations?  (benlysta only)  No  11. Have there been any other new onset medical symptoms? No        Post Infusion Assessment:  Patient tolerated infusion without incident.  Blood return noted pre and post infusion.  Site patent and intact, free from redness, edema or discomfort.  No evidence of extravasations.  Access discontinued per protocol.       Discharge Plan:   Discharge instructions reviewed with: Patient.  Patient and/or family verbalized understanding of discharge instructions and all questions answered.  AVS to patient via [employer].  Patient will return 9/25/20 for [facility] for next appointment.   Patient discharged in stable condition accompanied by: self.  Departure Mode: Ambulatory.    Laverne Abdi RN

## 2020-09-03 DIAGNOSIS — K50.10 CROHN'S DISEASE OF LARGE INTESTINE WITHOUT COMPLICATION (H): ICD-10-CM

## 2020-09-04 LAB — CALPROTECTIN STL-MCNT: 307 MG/KG (ref 0–49.9)

## 2020-09-04 NOTE — TELEPHONE ENCOUNTER
FUTURE VISIT INFORMATION      FUTURE VISIT INFORMATION:    Date: 9.9.20    Time: 10:00    Location: Video  REFERRAL INFORMATION:    Referring provider:  Dr. Abdirahman Dodson    Referring providers clinic:  [facility]    Reason for visit/diagnosis  swelling of lip, possible allergy    RECORDS REQUESTED FROM:       Clinic name Comments Records Status Photos Status   [facility] 8.10.20  Dr. Lucia Berger    2.24.20  Guero Arias PA-C [facility] Epic

## 2020-09-09 ENCOUNTER — VIRTUAL VISIT (OUTPATIENT)
Dept: ALLERGY | Facility: CLINIC | Age: 30
End: 2020-09-09
Payer: COMMERCIAL

## 2020-09-09 ENCOUNTER — PRE VISIT (OUTPATIENT)
Dept: ALLERGY | Facility: CLINIC | Age: 30
End: 2020-09-09

## 2020-09-09 DIAGNOSIS — R22.0 SWELLING OF BOTH LIPS: ICD-10-CM

## 2020-09-09 NOTE — LETTER
9/9/2020         RE: Natalie Villa  [address]  [place] [address]        Dear Colleague,    Thank you for referring your patient, Natalie Villa, to the [facility]. Please see a copy of my visit note below.    [facility] Dermato-Allergy Record     Allergy Problem List:    Specialty Problems        Allergy Diagnoses    Exercise-induced asthma              CC:   Allergy Consult (Lip swelling Referred by GI team - after taking antibiotic and believe  the lip swelling could be from the abx or seasonal allergies. Allergy testing done in [facility] and was positive for shellfish, lobster, clams. )        Encounter Date: Sep 9, 2020    History of Present Illness:  I have reviewed the teledermatology  information and the nursing intake corresponding to this issue. Natalie Villa is a 30 year old female who presents as a teledermatology consult for the following information take directly from the nursing note (kept in this note for patient safety) and video call performed by myself:     Started vancomycin 250 mg daily since mid-July for recurrent c diff infection. Starting in August had really dry, itchy, chapped lips. Still notes it presently. Has gotten a lot better. Was also using dermatone lip balm with SPF. Has taken a break from that which seems to be helping. Chap-stick brand and vaseline. No additional rash on the body. No reaction to topicals in the past. No history of eczema. Asthma on albuterol prn. Allergy prick testing in the past to seafood (clams, shrimp and lobster); minor reaction crab. Has mild allergies seasonally in the spring and then this past month. Sulfa drugs caused nausea when she was a child.     Past Medical History:   Patient Active Problem List   Diagnosis     Crohn's disease of large intestine without complication (H)     Herpes zoster without complication     ASCUS on Pap smear     Chronic tonsillitis     Deep vein thrombosis (DVT) of distal vein of left  lower extremity (H)     Microcytic anemia     Vitamin D deficiency     Exercise-induced asthma     No past medical history on file.    Allergy History:     Allergies   Allergen Reactions     Sulfa Drugs Nausea     Gi upset       Social History:  Working remotely currently    reports that she has never smoked. She has never used smokeless tobacco. She reports current alcohol use. She reports that she does not use drugs.      Family History:  No family history on file.    Medications:  Current Outpatient Medications   Medication Sig Dispense Refill     acetaminophen (TYLENOL) 325 MG tablet Take 325-650 mg by mouth every 6 hours as needed for mild pain       albuterol (PROAIR HFA/PROVENTIL HFA/VENTOLIN HFA) 108 (90 Base) MCG/ACT inhaler Inhale 2 puffs into the lungs every 6 hours 3 Inhaler 1     azaTHIOprine (IMURAN) 50 MG tablet Take 1.5 tablets (75 mg) by mouth 2 times daily 270 tablet 3     calcium carbonate (OS-ELIEL) 500 MG tablet Take 1 tablet by mouth daily        vancomycin (VANCOCIN) 125 MG capsule Take 1 capsule (125 mg) by mouth 2 times daily Continue until told to decrease or stop 60 capsule 3     vedolizumab (ENTYVIO) 60 MG/ML injection Inject 300 mg into the vein every 28 days        Vitamin D, Cholecalciferol, 1000 units CAPS Take 2 tablets by mouth daily          Additional comments and observations from review of the patient s chart including the following:    ROS: Patient generally feeling well today   Physical Examination:  General: Well-appearing, appropriately-developed individual.  Skin: Focused examination of the head and neck within the teledermatology photograph(s)* was performed.   -Mild xerosis and scale largely without swelling or erythema of the lips            Allergy Tests:    Holding at present      Impression/Plan:    #ICD in setting of atopy vs mild ACD to dermatone SPF lip product - potiential culprits include lanolin vs balsum of peru vs other  Unlikely to represent an allergic  reaction vancomycin. Favor ICD vs ACD as above. Discussed role of patch testing vs observation off topical product given improvement. Additionally, question of limited quality of patch testing on AZA. Following discussion, patient opts for monitoring, but will reach out should she have any question.  - Continue inert topical emollient only to lips - ie Vaseline  - Discussed role of patch testing in future should concerns arise  - Would patch to Sulfa should patient require patch testing given questionable history of nausea to sulfa as a child    Patient counseling:  Continue liberal amounts of vaseline only to the lips      Follow-up: prn    Thank you for the opportunity be involved in the care of this patient.     Staff:  Resident(Wesley)/Staff(Lena)    Staff Physician Comments:  I saw and evaluated the patient with the resident and I agree with the assessment and plan as documented in the resident's note.    Jamarcus العلي MD  Professor  Head of Dermato-Allergy Division  Department of Dermatology  [facility]    _____________________________________________________________________________    Teledermatology information:  - Location of patient: Home  - Patient presented in referral from: self other  - Location of teledermatologist:  ( [facility] (, [place], [place])  - Reason teledermatology is appropriate:  of National Emergency Regarding Coronavirus disease (COVID 19) Outbreak  - Method of transmission:  Video: ( Invitation sent by:  Óscar and text to cell phone: preferred number )  - Date of images: 8/12/20 and 9/9/20  - Service start time:1020AM  - Service end time:1036AM  - Date of report: 09/09/20      I spent a total of 16 minutes for telemedicine consult with Natalie Villa during today s video meeting. Over 50% of this time was spent counseling the patient and/or coordinating care. Please see Assessment and Plan for details.     Natalie Villa is a 30 year  "old female who is being evaluated via a billable video visit.      The patient has been notified of following:     \"This video visit will be conducted via a call between you and your physician/provider. We have found that certain health care needs can be provided without the need for an in-person physical exam.  This service lets us provide the care you need with a video conversation.  If a prescription is necessary we can send it directly to your pharmacy.  If lab work is needed we can place an order for that and you can then stop by our lab to have the test done at a later time.    Video visits are billed at different rates depending on your insurance coverage.  Please reach out to your insurance provider with any questions.    If during the course of the call the physician/provider feels a video visit is not appropriate, you will not be charged for this service.\"    Patient has given verbal consent for Video visit? Yes  How would you like to obtain your AVS? MyChart  If you are dropped from the video visit, the video invite should be resent to: Text to cell phone: [phone]  Will anyone else be joining your video visit? No        Video-Visit Details    Type of service:  Video Visit    Originating Location (pt. Location): Home    Distant Location (provider location):  [facility]     Platform used for Video Visit: Robbi Suarez LPN           Again, thank you for allowing me to participate in the care of your patient.        Sincerely,        Jamarcus العلي MD    "

## 2020-09-09 NOTE — PROGRESS NOTES
"Natalie Villa is a 30 year old female who is being evaluated via a billable video visit.      The patient has been notified of following:     \"This video visit will be conducted via a call between you and your physician/provider. We have found that certain health care needs can be provided without the need for an in-person physical exam.  This service lets us provide the care you need with a video conversation.  If a prescription is necessary we can send it directly to your pharmacy.  If lab work is needed we can place an order for that and you can then stop by our lab to have the test done at a later time.    Video visits are billed at different rates depending on your insurance coverage.  Please reach out to your insurance provider with any questions.    If during the course of the call the physician/provider feels a video visit is not appropriate, you will not be charged for this service.\"    Patient has given verbal consent for Video visit? Yes  How would you like to obtain your AVS? MyChart  If you are dropped from the video visit, the video invite should be resent to: Text to cell phone: [phone]  Will anyone else be joining your video visit? No        Video-Visit Details    Type of service:  Video Visit    Originating Location (pt. Location): Home    Distant Location (provider location):  [facility]     Platform used for Video Visit: Robbi Suarez LPN         "

## 2020-09-09 NOTE — PROGRESS NOTES
[facility] Dermato-Allergy Record     Allergy Problem List:    Specialty Problems        Allergy Diagnoses    Exercise-induced asthma              CC:   Allergy Consult (Lip swelling Referred by GI team - after taking antibiotic and believe  the lip swelling could be from the abx or seasonal allergies. Allergy testing done in [facility] and was positive for shellfish, lobster, clams. )        Encounter Date: Sep 9, 2020    History of Present Illness:  I have reviewed the teledermatology  information and the nursing intake corresponding to this issue. Natalie Villa is a 30 year old female who presents as a teledermatology consult for the following information take directly from the nursing note (kept in this note for patient safety) and video call performed by myself:     Started vancomycin 250 mg daily since mid-July for recurrent c diff infection. Starting in August had really dry, itchy, chapped lips. Still notes it presently. Has gotten a lot better. Was also using dermatone lip balm with SPF. Has taken a break from that which seems to be helping. Chap-stick brand and vaseline. No additional rash on the body. No reaction to topicals in the past. No history of eczema. Asthma on albuterol prn. Allergy prick testing in the past to seafood (clams, shrimp and lobster); minor reaction crab. Has mild allergies seasonally in the spring and then this past month. Sulfa drugs caused nausea when she was a child.     Past Medical History:   Patient Active Problem List   Diagnosis     Crohn's disease of large intestine without complication (H)     Herpes zoster without complication     ASCUS on Pap smear     Chronic tonsillitis     Deep vein thrombosis (DVT) of distal vein of left lower extremity (H)     Microcytic anemia     Vitamin D deficiency     Exercise-induced asthma     No past medical history on file.    Allergy History:     Allergies   Allergen Reactions     Sulfa Drugs Nausea     Gi upset       Social  History:  Working remotely currently    reports that she has never smoked. She has never used smokeless tobacco. She reports current alcohol use. She reports that she does not use drugs.      Family History:  No family history on file.    Medications:  Current Outpatient Medications   Medication Sig Dispense Refill     acetaminophen (TYLENOL) 325 MG tablet Take 325-650 mg by mouth every 6 hours as needed for mild pain       albuterol (PROAIR HFA/PROVENTIL HFA/VENTOLIN HFA) 108 (90 Base) MCG/ACT inhaler Inhale 2 puffs into the lungs every 6 hours 3 Inhaler 1     azaTHIOprine (IMURAN) 50 MG tablet Take 1.5 tablets (75 mg) by mouth 2 times daily 270 tablet 3     calcium carbonate (OS-ELIEL) 500 MG tablet Take 1 tablet by mouth daily        vancomycin (VANCOCIN) 125 MG capsule Take 1 capsule (125 mg) by mouth 2 times daily Continue until told to decrease or stop 60 capsule 3     vedolizumab (ENTYVIO) 60 MG/ML injection Inject 300 mg into the vein every 28 days        Vitamin D, Cholecalciferol, 1000 units CAPS Take 2 tablets by mouth daily          Additional comments and observations from review of the patient s chart including the following:    ROS: Patient generally feeling well today   Physical Examination:  General: Well-appearing, appropriately-developed individual.  Skin: Focused examination of the head and neck within the teledermatology photograph(s)* was performed.   -Mild xerosis and scale largely without swelling or erythema of the lips            Allergy Tests:    Holding at present      Impression/Plan:    #ICD in setting of atopy vs mild ACD to dermatone SPF lip product - potiential culprits include lanolin vs balsum of peru vs other  Unlikely to represent an allergic reaction vancomycin. Favor ICD vs ACD as above. Discussed role of patch testing vs observation off topical product given improvement. Additionally, question of limited quality of patch testing on AZA. Following discussion, patient opts for  monitoring, but will reach out should she have any question.  - Continue inert topical emollient only to lips - ie Vaseline  - Discussed role of patch testing in future should concerns arise  - Would patch to Sulfa should patient require patch testing given questionable history of nausea to sulfa as a child    Patient counseling:  Continue liberal amounts of vaseline only to the lips      Follow-up: prn    Thank you for the opportunity be involved in the care of this patient.     Staff:  Resident(Wesley)/Staff(Lena)    Staff Physician Comments:  I saw and evaluated the patient with the resident and I agree with the assessment and plan as documented in the resident's note.    Jamarcus العيل MD  Professor  Head of Dermato-Allergy Division  Department of Dermatology  [facility]    _____________________________________________________________________________    Teledermatology information:  - Location of patient: Home  - Patient presented in referral from: self other  - Location of teledermatologist:  ( [facility] (, [place], [place])  - Reason teledermatology is appropriate:  of National Emergency Regarding Coronavirus disease (COVID 19) Outbreak  - Method of transmission:  Video: ( Invitation sent by:  Amjay and text to cell phone: preferred number )  - Date of images: 8/12/20 and 9/9/20  - Service start time:1020AM  - Service end time:1036AM  - Date of report: 09/09/20      I spent a total of 16 minutes for telemedicine consult with Natalie Villa during today s video meeting. Over 50% of this time was spent counseling the patient and/or coordinating care. Please see Assessment and Plan for details.

## 2020-09-16 ENCOUNTER — VIRTUAL VISIT (OUTPATIENT)
Dept: GASTROENTEROLOGY | Facility: CLINIC | Age: 30
End: 2020-09-16
Payer: COMMERCIAL

## 2020-09-16 VITALS — WEIGHT: 176 LBS | BODY MASS INDEX: 29.32 KG/M2 | HEIGHT: 65 IN

## 2020-09-16 DIAGNOSIS — A04.72 COLITIS DUE TO CLOSTRIDIUM DIFFICILE: ICD-10-CM

## 2020-09-16 DIAGNOSIS — K50.10 CROHN'S DISEASE OF COLON WITHOUT COMPLICATION (H): Primary | ICD-10-CM

## 2020-09-16 ASSESSMENT — MIFFLIN-ST. JEOR: SCORE: 1519.21

## 2020-09-16 ASSESSMENT — PAIN SCALES - GENERAL: PAINLEVEL: NO PAIN (0)

## 2020-09-16 NOTE — LETTER
9/16/2020       RE: Natalie Villa  [address]  [facility] [address]     Dear Colleague,    Thank you for referring your patient, Natalie Villa, to the [facility] GASTROENTEROLOGY AND IBD CLINIC at [facility]. Please see a copy of my visit note below.    GI CLINIC VISIT - ESTABLISHED PATIENT    CC/REFERRING PROVIDER: Referred Self  REASON FOR CONSULTATION: IBD follow up    HPI: 28 year old female with history of Crohn's vs UC on Vedolizumab (failed adalimumab, and questionable antibodies to infliximab, now on vedolizumab since 6/19/2015) + AZA, initially diagnosed with Crohn's in 2013. Her symptoms at the time were bloody diarrhea, left sided abdominal pain and general fatigue. She was treated with Humira for 1 year but stopped after colonoscopy showed ongoing disease. Then was treated with Remicade for about 18 months but per patient report, thinks she developed antibodies to it. She was started on Vedolizumab in June of 2015 initially at 300 mg Q8 weeks which was increased to Q6 weeks and mostly recently to Q4 weeks in May 2018 after colonoscopy in April 2018 showed ongoing colitis. She has been on AZA since diagnosis, initially at 200 mg Qday which has been titrated down to 100 mg Qday.     She had her first occurrence of C. difficile infection after the stool studies were completed after last visit in October 2018.  She completed a vancomycin taper.  Second occurrence (spontaneous without abx trigger) treated with vancomycin QID x 10 days then daily. Fecal esvin elevated to ~300, repeat cscope at that time showed Borrego 1 in descending, sigmoid and rectum.     She is currently having an average of 4 stools per day that are formed.  She has started running again, and occasionally noticed stools can be more loose.  She had one incidence of blood in her stool in January without any associated symptoms.  Due to a job change, she had to miss her January infusion with  "vedolizumab.  She noticed blood in her stool around this time after missing her dose.    Patient has had a significant amount of changes that have contributed to increased anxiety and some depression.  She changed jobs at the end of 2019, she moved in with her boyfriend, and some stresses with family dynamics.  With these changes she is noticed increased weight gain, increased alcohol consumption and increased fatigue despite 9 hours of sleep per night.  With the change in jobs, she now works from home, and she finds it difficult to maintain a schedule and remain active.  She also notes she was a week late in her menstrual cycle around the time of when her infusion was due.    Some increased pruritus in scalp that improved with restart of Selsun Blue shampoo.  She notes a questionable new allergy to shellfish with some itchiness in her throat after eating sushi recently.  No joint manifestations.    She also continues to follow with hematology as she was diagnosed with a LLE DVT fall 2018 that was thought to be provoked and completed therapy with Eliquis.  She will have repeat labs this spring with follow up and determine if IV iron infusions are required. Most recently, hemoglobin normal 2/14/20.    Interval history, 5/2020 (virtual visit)  Continues on VDZ q4w + AZA 75 mg BID. Last infusion 5/8/2020 (4/10/2020). Of note, CRP was 25.   Had met with Dr. Obrien. Tried the Whole 30 diet Mar-Apr. Stool was inconsistent at that time in the s/o increased fiber use (had loose stools up to 7 x per day). Stool normalized after liberalizing diet.     Using \"GI monitor\" brandon. Having ~4 BMs per day. Minimal urgency, mixed consistency stool, no blood. No abd pain.     Reports feeling well compared to the past. Seems to be inconsistent with recent CRP elevation.     Interval history, 9/2020 (video visit)  Saw Dr. Dodson with plans for a fecal calprotectin check in the setting of suppressive vancomycin. FC was 307.   Feeling " "\"pretty good\".   No abd pain. Having 3-4 BMs per day (improved). No blood. No urgency. Has not been dieting and exercising as much as before.   No joint pain or rashes.   Has been sober since March; has found this challenging.   Last Entyvio infusion was 8/28    ROS: 10pt ROS performed and otherwise negative.    PERTINENT PAST MEDICAL HISTORY:  As noted above.    PREVIOUS ABDOMINAL/GYNECOLOGIC SURGERIES:  None     PREVIOUS ENDOSCOPY:  Colonoscopy 4/23/2018 at [facility]: Mild colitis starting at the transverse colon (Borrego 1), progressing to more moderate colitis in the rectosigmoid (Borrego 2). Bx showed chronic colitis. TI and right colon were unremarkable endoscopically and bx showed mild chronic activity.     Flex sig 10/2/2018 shows Borrego 2/3, continuous, biopsies show moderate chronic active colitis.    Colonoscopy 5/2019 shows  Borrego 1 in descending, sigmoid and rectum. Bx show mild active chronic colitis with cryptitis and focal erosion.     PERTINENT MEDICATIONS:  - Azathioprine 75 mg BID  - Vedolizumab 300 mg Q4W (last dose 9/21/18)  - [facility] OCP   Medications reviewed with patient today, see Medication List/Assessment for details.  No other NSAID/anticoagulation reported by patient.  No other OTC/herbal/supplements reported by patient.    SOCIAL HISTORY:  - Works as   - Uses alcohol 1-2 times/month   - Smokes marijuana 2-3 times/week  - No tobacco   - Sexually active, uses condoms      FAMILY HISTORY:  No colon/panc/esophageal/other GI CA, no other Valverde or other HPS-related Keo. No IBD/celiac, no other AI/liver/thyroid disease. + for asthma and acne.     PHYSICAL EXAMINATION:   General appearance  Healthy appearing adult, in no acute distress     Eyes  Sclera anicteric  Pupils round and reactive to light     Ears, nose, mouth and throat  No obvious external lesions of ears and nose  Hearing intact     Neck  Symmetric  No obvious external lesions     Respiratory  Normal respiration, no use of accessory " muscles      MSK  Gait normal     Skin  No rashes or jaundice      Psychiatric  Oriented to person, place and time  Appropriate mood and affect.           ASSESSMENT/PLAN:  Summer is doing well clinically on suppressive vancomycin.  However, her fecal calprotectin level was elevated at 400. We will plan to perform a colonoscopy to assess underlying colitis while remaining on the vancomycin.  This will allow us to isolate the inflammation from the IBD itself.     We will proceed with the following plan:    # Indeterminate colitis, on vedolizumab + AZA (hx of Humira, Remicade)  # History of DVT  # Recurrent Cdiff infection  ---Referral for colonoscopy under MAC to assess underlying colitis driven by IBD +/- dysplasia screening  ---Continue on vancomycin for now   ---Recheck C diff now. A negative result would be helpful as a frame of reference for future testing should you develop diarrhea again. A positive test may be a sign of colonization.   ---If we need to move from vedolizumab to another biologic, possibilities would be Stelara, Cimzia and weekly Humira   - Continue Entyvio 300mg IV Q4 weeks (dose adjusted to Q4W in May 2018) for now  - Continue AZA 75 BID  - Labs with infusions to include LFT, CBC ESR/CRP    # LLE DVT:   thought to be provoked (factors include prolonged car ride from [place] to [place] when moved here this summer, strain to gastrocnemius muscle on crutches for 6 weeks, on birth control and active IBD) and was previously on Eliquis.   She discontinued oral contraceptives. Will not favor tofacitinib has a therapy given DVT history.    # IBD health maintenance   --Greatly appreciate [facility] pharmacy    Vaccinations:  -- Influenza (every year): Last given 2019  -- TdaP (every 10 years): Last given 2014  -- Pneumococcal Pneumonia (once then every 5 years): Last given 2015  -- Yearly assessment for latent Tb (verbal screening and exam, PPD or QuantiFERON-Tb testing): Indeterminate due to anergy 2017     One  time confirmation of immunity or serologies:  -- Hepatitis A (serologies or immunizations): Unknown  -- Hepatitis B (serologies or immunizations): Vaccinated  -- Varicella: had chicken pox as a child  -- MMR: Unknwon   -- HPV (all aged 18-26): Up to date  -- Meningococcal meningitis (all patients at risk for meningitis): Unknown  -- Due to the immunosuppression in this patient, I would not advise administration of live vaccines such as varicella/VZV, intranasal influenza, MMR, or yellow fever vaccine (if travelling).      Bone mineral density screening   -- DEXA WNL 2015    Cancer Screening:  Colon cancer screening: due 2020  Cervical cancer screening: Annual due to immunosupression    PERTINENT STUDIES:  CBC RESULTS:   Recent Labs   Lab Test 02/14/20  1505   WBC 7.0   RBC 3.76*   HGB 11.7   HCT 36.4   MCV 97   MCH 31.1   MCHC 32.1   RDW 12.0        CRP Inflammation   Date Value Ref Range Status   08/28/2020 <2.9 0.0 - 8.0 mg/L Final     RTC in 6 weeks     Again, thank you for allowing me to participate in the care of your patient.  Sincerely,    Lucia eBrger MD

## 2020-09-16 NOTE — PATIENT INSTRUCTIONS
PLAN  ---Referral for colonoscopy under MAC to assess underlying colitis driven by IBD   ---Continue on vancomycin for now   ---Recheck C diff now. A negative result would be helpful as a frame of reference for future testing should you develop diarrhea again. A positive test may be a sign of colonization.

## 2020-09-16 NOTE — NURSING NOTE
"Chief Complaint   Patient presents with     Follow Up     6 week follow up.       Vitals:    09/16/20 1530   Weight: 79.8 kg (176 lb)   Height: 1.651 m (5' 5\")       Body mass index is 29.29 kg/m .         Anjelica Nguyễn CMA    "

## 2020-09-16 NOTE — PROGRESS NOTES
"Naatlie Villa is a 30 year old female who is being evaluated via a billable video visit.      The patient has been notified of following:     \"This video visit will be conducted via a call between you and your physician/provider. We have found that certain health care needs can be provided without the need for an in-person physical exam.  This service lets us provide the care you need with a video conversation.  If a prescription is necessary we can send it directly to your pharmacy.  If lab work is needed we can place an order for that and you can then stop by our lab to have the test done at a later time.    Video visits are billed at different rates depending on your insurance coverage.  Please reach out to your insurance provider with any questions.    If during the course of the call the physician/provider feels a video visit is not appropriate, you will not be charged for this service.\"    Patient has given verbal consent for Video visit? Yes  How would you like to obtain your AVS? MyChart  If you are dropped from the video visit, the video invite should be resent to: Text to cell phone: [phone]  Will anyone else be joining your video visit? No      GI CLINIC VISIT - ESTABLISHED PATIENT    CC/REFERRING PROVIDER: Referred Self  REASON FOR CONSULTATION: IBD follow up    HPI: 28 year old female with history of Crohn's vs UC on Vedolizumab (failed adalimumab, and questionable antibodies to infliximab, now on vedolizumab since 6/19/2015) + AZA, initially diagnosed with Crohn's in 2013. Her symptoms at the time were bloody diarrhea, left sided abdominal pain and general fatigue. She was treated with Humira for 1 year but stopped after colonoscopy showed ongoing disease. Then was treated with Remicade for about 18 months but per patient report, thinks she developed antibodies to it. She was started on Vedolizumab in June of 2015 initially at 300 mg Q8 weeks which was increased to Q6 weeks and mostly recently " to Q4 weeks in May 2018 after colonoscopy in April 2018 showed ongoing colitis. She has been on AZA since diagnosis, initially at 200 mg Qday which has been titrated down to 100 mg Qday.     She had her first occurrence of C. difficile infection after the stool studies were completed after last visit in October 2018.  She completed a vancomycin taper.  Second occurrence (spontaneous without abx trigger) treated with vancomycin QID x 10 days then daily. Fecal esvin elevated to ~300, repeat cscope at that time showed Borrego 1 in descending, sigmoid and rectum.     She is currently having an average of 4 stools per day that are formed.  She has started running again, and occasionally noticed stools can be more loose.  She had one incidence of blood in her stool in January without any associated symptoms.  Due to a job change, she had to miss her January infusion with vedolizumab.  She noticed blood in her stool around this time after missing her dose.    Patient has had a significant amount of changes that have contributed to increased anxiety and some depression.  She changed jobs at the end of 2019, she moved in with her boyfriend, and some stresses with family dynamics.  With these changes she is noticed increased weight gain, increased alcohol consumption and increased fatigue despite 9 hours of sleep per night.  With the change in jobs, she now works from home, and she finds it difficult to maintain a schedule and remain active.  She also notes she was a week late in her menstrual cycle around the time of when her infusion was due.    Some increased pruritus in scalp that improved with restart of Selsun Blue shampoo.  She notes a questionable new allergy to shellfish with some itchiness in her throat after eating sushi recently.  No joint manifestations.    She also continues to follow with hematology as she was diagnosed with a LLE DVT fall 2018 that was thought to be provoked and completed therapy with Eliquis.  She  "will have repeat labs this spring with follow up and determine if IV iron infusions are required. Most recently, hemoglobin normal 2/14/20.    Interval history, 5/2020 (virtual visit)  Continues on VDZ q4w + AZA 75 mg BID. Last infusion 5/8/2020 (4/10/2020). Of note, CRP was 25.   Had met with Dr. Obrien. Tried the Whole 30 diet Mar-Apr. Stool was inconsistent at that time in the s/o increased fiber use (had loose stools up to 7 x per day). Stool normalized after liberalizing diet.     Using \"GI monitor\" brandon. Having ~4 BMs per day. Minimal urgency, mixed consistency stool, no blood. No abd pain.     Reports feeling well compared to the past. Seems to be inconsistent with recent CRP elevation.     Interval history, 9/2020 (video visit)  Saw Dr. Dodson with plans for a fecal calprotectin check in the setting of suppressive vancomycin. FC was 307.   Feeling \"pretty good\".   No abd pain. Having 3-4 BMs per day (improved). No blood. No urgency. Has not been dieting and exercising as much as before.   No joint pain or rashes.   Has been sober since March; has found this challenging.   Last Entyvio infusion was 8/28    ROS: 10pt ROS performed and otherwise negative.    PERTINENT PAST MEDICAL HISTORY:  As noted above.    PREVIOUS ABDOMINAL/GYNECOLOGIC SURGERIES:  None     PREVIOUS ENDOSCOPY:  Colonoscopy 4/23/2018 at [facility]: Mild colitis starting at the transverse colon (Borrego 1), progressing to more moderate colitis in the rectosigmoid (Borrego 2). Bx showed chronic colitis. TI and right colon were unremarkable endoscopically and bx showed mild chronic activity.     Flex sig 10/2/2018 shows Borrego 2/3, continuous, biopsies show moderate chronic active colitis.    Colonoscopy 5/2019 shows  Borrego 1 in descending, sigmoid and rectum. Bx show mild active chronic colitis with cryptitis and focal erosion.     PERTINENT MEDICATIONS:  - Azathioprine 75 mg BID  - Vedolizumab 300 mg Q4W (last dose 9/21/18)  - [facility] OCP   Medications " reviewed with patient today, see Medication List/Assessment for details.  No other NSAID/anticoagulation reported by patient.  No other OTC/herbal/supplements reported by patient.    SOCIAL HISTORY:  - Works as   - Uses alcohol 1-2 times/month   - Smokes marijuana 2-3 times/week  - No tobacco   - Sexually active, uses condoms      FAMILY HISTORY:  No colon/panc/esophageal/other GI CA, no other Valverde or other HPS-related Keo. No IBD/celiac, no other AI/liver/thyroid disease. + for asthma and acne.     PHYSICAL EXAMINATION:   General appearance  Healthy appearing adult, in no acute distress     Eyes  Sclera anicteric  Pupils round and reactive to light     Ears, nose, mouth and throat  No obvious external lesions of ears and nose  Hearing intact     Neck  Symmetric  No obvious external lesions     Respiratory  Normal respiration, no use of accessory muscles      MSK  Gait normal     Skin  No rashes or jaundice      Psychiatric  Oriented to person, place and time  Appropriate mood and affect.     ASSESSMENT/PLAN:  Summer is doing well clinically on suppressive vancomycin.  However, her fecal calprotectin level was elevated at 400. We will plan to perform a colonoscopy to assess underlying colitis while remaining on the vancomycin.  This will allow us to isolate the inflammation from the IBD itself.     We will proceed with the following plan:    # Indeterminate colitis, on vedolizumab + AZA (hx of Humira, Remicade)  # History of DVT  # Recurrent Cdiff infection  ---Referral for colonoscopy under MAC to assess underlying colitis driven by IBD +/- dysplasia screening  ---Continue on vancomycin for now   ---Recheck C diff now. A negative result would be helpful as a frame of reference for future testing should you develop diarrhea again. A positive test may be a sign of colonization.   ---If we need to move from vedolizumab to another biologic, possibilities would be Stelara, Cimzia and weekly Humira   - Continue  Entyvio 300mg IV Q4 weeks (dose adjusted to Q4W in May 2018) for now  - Continue AZA 75 BID  - Labs with infusions to include LFT, CBC ESR/CRP    # LLE DVT:   thought to be provoked (factors include prolonged car ride from [place] to [place] when moved here this summer, strain to gastrocnemius muscle on crutches for 6 weeks, on birth control and active IBD) and was previously on Eliquis.   She discontinued oral contraceptives. Will not favor tofacitinib has a therapy given DVT history.    # IBD health maintenance   --Greatly appreciate [facility] pharmacy    Vaccinations:  -- Influenza (every year): Last given 2019  -- TdaP (every 10 years): Last given 2014  -- Pneumococcal Pneumonia (once then every 5 years): Last given 2015  -- Yearly assessment for latent Tb (verbal screening and exam, PPD or QuantiFERON-Tb testing): Indeterminate due to anergy 2017     One time confirmation of immunity or serologies:  -- Hepatitis A (serologies or immunizations): Unknown  -- Hepatitis B (serologies or immunizations): Vaccinated  -- Varicella: had chicken pox as a child  -- MMR: Unknwon   -- HPV (all aged 18-26): Up to date  -- Meningococcal meningitis (all patients at risk for meningitis): Unknown  -- Due to the immunosuppression in this patient, I would not advise administration of live vaccines such as varicella/VZV, intranasal influenza, MMR, or yellow fever vaccine (if travelling).      Bone mineral density screening   -- DEXA WNL 2015    Cancer Screening:  Colon cancer screening: due 2020  Cervical cancer screening: Annual due to immunosupression    PERTINENT STUDIES:  CBC RESULTS:   Recent Labs   Lab Test 02/14/20  1505   WBC 7.0   RBC 3.76*   HGB 11.7   HCT 36.4   MCV 97   MCH 31.1   MCHC 32.1   RDW 12.0        CRP Inflammation   Date Value Ref Range Status   08/28/2020 <2.9 0.0 - 8.0 mg/L Final     RTC in 6 weeks     Video-Visit Details    Type of service:  Video Visit    Video Start Time: 4:26 PM  Video End Time: 5:06  PM    Originating Location (pt. Location): Home    Distant Location (provider location):  [facility] GASTROENTEROLOGY AND IBD CLINIC     Platform used for Video Visit: Robbi Berger MD

## 2020-09-16 NOTE — LETTER
"    9/16/2020         RE: Natalie Villa  [address]  [facility] [address]        Dear Colleague,    Thank you for referring your patient, Natalie Villa, to the [facility]. Please see a copy of my visit note below.    Natalie Villa is a 30 year old female who is being evaluated via a billable video visit.      The patient has been notified of following:     \"This video visit will be conducted via a call between you and your physician/provider. We have found that certain health care needs can be provided without the need for an in-person physical exam.  This service lets us provide the care you need with a video conversation.  If a prescription is necessary we can send it directly to your pharmacy.  If lab work is needed we can place an order for that and you can then stop by our lab to have the test done at a later time.    Video visits are billed at different rates depending on your insurance coverage.  Please reach out to your insurance provider with any questions.    If during the course of the call the physician/provider feels a video visit is not appropriate, you will not be charged for this service.\"    Patient has given verbal consent for Video visit? Yes  How would you like to obtain your AVS? MyChart  If you are dropped from the video visit, the video invite should be resent to: Text to cell phone: [phone]  Will anyone else be joining your video visit? No      GI CLINIC VISIT - ESTABLISHED PATIENT    CC/REFERRING PROVIDER: Referred Self  REASON FOR CONSULTATION: IBD follow up    HPI: 28 year old female with history of Crohn's vs UC on Vedolizumab (failed adalimumab, and questionable antibodies to infliximab, now on vedolizumab since 6/19/2015) + AZA, initially diagnosed with Crohn's in 2013. Her symptoms at the time were bloody diarrhea, left sided abdominal pain and general fatigue. She was treated with Humira for 1 year but stopped after colonoscopy " showed ongoing disease. Then was treated with Remicade for about 18 months but per patient report, thinks she developed antibodies to it. She was started on Vedolizumab in June of 2015 initially at 300 mg Q8 weeks which was increased to Q6 weeks and mostly recently to Q4 weeks in May 2018 after colonoscopy in April 2018 showed ongoing colitis. She has been on AZA since diagnosis, initially at 200 mg Qday which has been titrated down to 100 mg Qday.     She had her first occurrence of C. difficile infection after the stool studies were completed after last visit in October 2018.  She completed a vancomycin taper.  Second occurrence (spontaneous without abx trigger) treated with vancomycin QID x 10 days then daily. Fecal esvin elevated to ~300, repeat cscope at that time showed Borrego 1 in descending, sigmoid and rectum.     She is currently having an average of 4 stools per day that are formed.  She has started running again, and occasionally noticed stools can be more loose.  She had one incidence of blood in her stool in January without any associated symptoms.  Due to a job change, she had to miss her January infusion with vedolizumab.  She noticed blood in her stool around this time after missing her dose.    Patient has had a significant amount of changes that have contributed to increased anxiety and some depression.  She changed jobs at the end of 2019, she moved in with her boyfriend, and some stresses with family dynamics.  With these changes she is noticed increased weight gain, increased alcohol consumption and increased fatigue despite 9 hours of sleep per night.  With the change in jobs, she now works from home, and she finds it difficult to maintain a schedule and remain active.  She also notes she was a week late in her menstrual cycle around the time of when her infusion was due.    Some increased pruritus in scalp that improved with restart of Selsun Blue shampoo.  She notes a questionable new allergy to  "shellfish with some itchiness in her throat after eating sushi recently.  No joint manifestations.    She also continues to follow with hematology as she was diagnosed with a LLE DVT fall 2018 that was thought to be provoked and completed therapy with Eliquis.  She will have repeat labs this spring with follow up and determine if IV iron infusions are required. Most recently, hemoglobin normal 2/14/20.    Interval history, 5/2020 (virtual visit)  Continues on VDZ q4w + AZA 75 mg BID. Last infusion 5/8/2020 (4/10/2020). Of note, CRP was 25.   Had met with Dr. Obrien. Tried the Whole 30 diet Mar-Apr. Stool was inconsistent at that time in the s/o increased fiber use (had loose stools up to 7 x per day). Stool normalized after liberalizing diet.     Using \"GI monitor\" brandon. Having ~4 BMs per day. Minimal urgency, mixed consistency stool, no blood. No abd pain.     Reports feeling well compared to the past. Seems to be inconsistent with recent CRP elevation.     Interval history, 9/2020 (video visit)  Saw Dr. Dodson with plans for a fecal calprotectin check in the setting of suppressive vancomycin. FC was 307.   Feeling \"pretty good\".   No abd pain. Having 3-4 BMs per day (improved). No blood. No urgency. Has not been dieting and exercising as much as before.   No joint pain or rashes.   Has been sober since March; has found this challenging.   Last Entyvio infusion was 8/28    ROS: 10pt ROS performed and otherwise negative.    PERTINENT PAST MEDICAL HISTORY:  As noted above.    PREVIOUS ABDOMINAL/GYNECOLOGIC SURGERIES:  None     PREVIOUS ENDOSCOPY:  Colonoscopy 4/23/2018 at [facility]: Mild colitis starting at the transverse colon (Borrego 1), progressing to more moderate colitis in the rectosigmoid (Borrego 2). Bx showed chronic colitis. TI and right colon were unremarkable endoscopically and bx showed mild chronic activity.     Flex sig 10/2/2018 shows Borrego 2/3, continuous, biopsies show moderate chronic active " colitis.    Colonoscopy 5/2019 shows  Borrego 1 in descending, sigmoid and rectum. Bx show mild active chronic colitis with cryptitis and focal erosion.     PERTINENT MEDICATIONS:  - Azathioprine 75 mg BID  - Vedolizumab 300 mg Q4W (last dose 9/21/18)  - Maria Victoria OCP   Medications reviewed with patient today, see Medication List/Assessment for details.  No other NSAID/anticoagulation reported by patient.  No other OTC/herbal/supplements reported by patient.    SOCIAL HISTORY:  - Works as   - Uses alcohol 1-2 times/month   - Smokes marijuana 2-3 times/week  - No tobacco   - Sexually active, uses condoms      FAMILY HISTORY:  No colon/panc/esophageal/other GI CA, no other Valverde or other HPS-related Keo. No IBD/celiac, no other AI/liver/thyroid disease. + for asthma and acne.     PHYSICAL EXAMINATION:   General appearance  Healthy appearing adult, in no acute distress     Eyes  Sclera anicteric  Pupils round and reactive to light     Ears, nose, mouth and throat  No obvious external lesions of ears and nose  Hearing intact     Neck  Symmetric  No obvious external lesions     Respiratory  Normal respiration, no use of accessory muscles      MSK  Gait normal     Skin  No rashes or jaundice      Psychiatric  Oriented to person, place and time  Appropriate mood and affect.     ASSESSMENT/PLAN:  Summer is doing well clinically on suppressive vancomycin.  However, her fecal calprotectin level was elevated at 400. We will plan to perform a colonoscopy to assess underlying colitis while remaining on the vancomycin.  This will allow us to isolate the inflammation from the IBD itself.     We will proceed with the following plan:    # Indeterminate colitis, on vedolizumab + AZA (hx of Humira, Remicade)  # History of DVT  # Recurrent Cdiff infection  ---Referral for colonoscopy under [facility] to assess underlying colitis driven by IBD +/- dysplasia screening  ---Continue on vancomycin for now   ---Recheck C diff now. A negative  result would be helpful as a frame of reference for future testing should you develop diarrhea again. A positive test may be a sign of colonization.   ---If we need to move from vedolizumab to another biologic, possibilities would be Stelara, Cimzia and weekly Humira   - Continue Entyvio 300mg IV Q4 weeks (dose adjusted to Q4W in May 2018) for now  - Continue AZA 75 BID  - Labs with infusions to include LFT, CBC ESR/CRP    # LLE DVT:   thought to be provoked (factors include prolonged car ride from [place] to [place] when moved here this summer, strain to gastrocnemius muscle on crutches for 6 weeks, on birth control and active IBD) and was previously on Eliquis.   She discontinued oral contraceptives. Will not favor tofacitinib has a therapy given DVT history.    # IBD health maintenance   --Greatly appreciate [facility]    Vaccinations:  -- Influenza (every year): Last given 2019  -- TdaP (every 10 years): Last given 2014  -- Pneumococcal Pneumonia (once then every 5 years): Last given 2015  -- Yearly assessment for latent Tb (verbal screening and exam, PPD or QuantiFERON-Tb testing): Indeterminate due to anergy 2017     One time confirmation of immunity or serologies:  -- Hepatitis A (serologies or immunizations): Unknown  -- Hepatitis B (serologies or immunizations): Vaccinated  -- Varicella: had chicken pox as a child  -- MMR: [place]   -- HPV (all aged 18-26): Up to date  -- Meningococcal meningitis (all patients at risk for meningitis): Unknown  -- Due to the immunosuppression in this patient, I would not advise administration of live vaccines such as varicella/VZV, intranasal influenza, MMR, or yellow fever vaccine (if travelling).      Bone mineral density screening   -- DEXA WNL 2015    Cancer Screening:  Colon cancer screening: due 2020  Cervical cancer screening: Annual due to immunosupression    PERTINENT STUDIES:  CBC RESULTS:   Recent Labs   Lab Test 02/14/20  1505   WBC 7.0   RBC 3.76*   HGB 11.7   HCT  36.4   MCV 97   MCH 31.1   MCHC 32.1   RDW 12.0        CRP Inflammation   Date Value Ref Range Status   08/28/2020 <2.9 0.0 - 8.0 mg/L Final     RTC in 6 weeks     Video-Visit Details    Type of service:  Video Visit    Video Start Time: 4:26 PM  Video End Time: 5:06 PM    Originating Location (pt. Location): Home    Distant Location (provider location):  [facility]     Platform used for Video Visit: [facility]    Lucia Berger MD          Again, thank you for allowing me to participate in the care of your patient.        Sincerely,        Lucia Berger MD

## 2020-09-25 ENCOUNTER — INFUSION THERAPY VISIT (OUTPATIENT)
Dept: INFUSION THERAPY | Facility: CLINIC | Age: 30
End: 2020-09-25
Attending: INTERNAL MEDICINE
Payer: COMMERCIAL

## 2020-09-25 VITALS
DIASTOLIC BLOOD PRESSURE: 68 MMHG | TEMPERATURE: 97.9 F | RESPIRATION RATE: 16 BRPM | HEART RATE: 65 BPM | SYSTOLIC BLOOD PRESSURE: 97 MMHG | OXYGEN SATURATION: 98 %

## 2020-09-25 DIAGNOSIS — K50.10 CROHN'S DISEASE OF LARGE INTESTINE WITHOUT COMPLICATION (H): Primary | ICD-10-CM

## 2020-09-25 PROCEDURE — 96365 THER/PROPH/DIAG IV INF INIT: CPT

## 2020-09-25 PROCEDURE — 25800030 ZZH RX IP 258 OP 636: Performed by: PHYSICIAN ASSISTANT

## 2020-09-25 PROCEDURE — 25000128 H RX IP 250 OP 636: Performed by: PHYSICIAN ASSISTANT

## 2020-09-25 RX ADMIN — VEDOLIZUMAB 300 MG: 300 INJECTION, POWDER, LYOPHILIZED, FOR SOLUTION INTRAVENOUS at 09:30

## 2020-09-25 NOTE — PROGRESS NOTES
Infusion Nursing Note:  Natalie Villa presents today for Entyvio.    Patient seen by provider today: No   present during visit today: Not Applicable.    Note: Patient continues on Vancomycin for history of c-diff. Patient reports she's sending in stool sample next week to reevaluate. Says she's having approx 4 stools a day, but they are formed.     Intravenous Access:  Peripheral IV placed.  Patient due for labs next infusion    Treatment Conditions:  Biological Infusion Checklist:  ~~~ NOTE: If the patient answers yes to any of the questions below, hold the infusion and contact ordering provider or on-call provider.    1. Have you recently had an elevated temperature, fever, chills, productive cough, coughing for 3 weeks or longer or hemoptysis, abnormal vital signs, night sweats,  chest pain or have you noticed a decrease in your appetite, unexplained weight loss or fatigue? No  2. Do you have any open wounds or new incisions? No  3. Do you have any recent or upcoming hospitalizations, surgeries or dental procedures? No  4. Do you currently have or recently have had any signs of illness or infection or are you on any antibiotics? Yes, Vancomycin for c-diff. Been on since July, provider aware  5. Have you had any new, sudden or worsening abdominal pain? No  6. Have you or anyone in your household received a live vaccination in the past 4 weeks? Please note:  No live vaccines while on biologic/chemotherapy until 6 months after the last treatment.  Patient can receive the flu vaccine (shot only) and the pneumovax.  It is optimal for the patient to get these vaccines mid cycle, but they can be given at any time as long as it is not on the day of the infusion. No  7. Have you recently been diagnosed with any new nervous system diseases (ie. Multiple sclerosis, Guillain [place], seizures, neurological changes) or cancer diagnosis? No  8. Are you on any form of radiation or chemotherapy? No  9. Are you  pregnant or breast feeding or do you have plans of pregnancy in the future? No  10. Have you been having any signs of worsening depression or suicidal ideations?  (benlysta only) No  11. Have there been any other new onset medical symptoms? No        Post Infusion Assessment:  Patient tolerated infusion without incident.  Blood return noted pre and post infusion.  Site patent and intact, free from redness, edema or discomfort.  No evidence of extravasations.  Access discontinued per protocol.  Biologic Infusion Post Education: Call the triage nurse at your clinic or seek medical attention if you have chills and/or temperature greater than or equal to 100.5, uncontrolled nausea/vomiting, diarrhea, constipation, dizziness, shortness of breath, chest pain, heart palpitations, weakness or any other new or concerning symptoms, questions or concerns.  You cannot have any live virus vaccines prior to or during treatment or up to 6 months post infusion.  If you have an upcoming surgery, medical procedure or dental procedure during treatment, this should be discussed with your ordering physician and your surgeon/dentist.  If you are having any concerning symptom, if you are unsure if you should get your next infusion or wish to speak to a provider before your next infusion, please call your care coordinator or triage nurse at your clinic to notify them so we can adequately serve you.       Discharge Plan:   Copy of AVS reviewed with patient and/or family.  Patient will return 10/23 for next appointment.  Patient discharged in stable condition accompanied by: self.  Departure Mode: Ambulatory.    Laverne Og RN

## 2020-10-07 ENCOUNTER — VIRTUAL VISIT (OUTPATIENT)
Dept: FAMILY MEDICINE | Facility: OTHER | Age: 30
End: 2020-10-07
Payer: COMMERCIAL

## 2020-10-07 PROCEDURE — 99421 OL DIG E/M SVC 5-10 MIN: CPT | Performed by: PHYSICIAN ASSISTANT

## 2020-10-08 DIAGNOSIS — Z20.822 SUSPECTED COVID-19 VIRUS INFECTION: Primary | ICD-10-CM

## 2020-10-08 NOTE — PROGRESS NOTES
"Date: 10/07/2020 19:04:08  Clinician: Manoj Pritchett  Clinician NPI: [id number]  Patient: Natalie Villa  Patient : 1990  Patient Address: [address] [place] [address]  Patient Phone: [phone]  Visit Protocol: URI  Patient Summary:  Natalie is a 30 year old ( : 1990 ) female who initiated a [facility] Visit for COVID-19 (Coronavirus) evaluation and screening. When asked the question \"Please sign me up to receive news, health information and promotions from [facility].\", Natalie responded \"No\".    Natalie states her symptoms started today.   Her symptoms consist of a headache, nasal congestion, rhinitis, malaise, and a sore throat. She is experiencing difficulty breathing due to nasal congestion but she is not short of breath.   Symptom details     Nasal secretions: The color of her mucus is clear.    Sore throat: Natalie reports having mild throat pain (1-3 on a 10 point pain scale), does not have exudate on her tonsils, and can swallow liquids. She is not sure if the lymph nodes in her neck are enlarged. A rash has not appeared on the skin since the sore throat started.     Headache: She states the headache is mild (1-3 on a 10 point pain scale).      Natalie denies having ear pain, wheezing, fever, cough, anosmia, vomiting, nausea, facial pain or pressure, myalgias, chills, teeth pain, ageusia, and diarrhea. She also denies having recent facial or sinus surgery in the past 60 days.   Precipitating events  Within the past week, Natalie has not been exposed to someone with strep throat.   Pertinent COVID-19 (Coronavirus) information  In the past 14 days, Natalie has not worked in a congregate living setting.   She does not work or volunteer as healthcare worker or a  and does not work or volunteer in a healthcare facility.   Natalie also has not lived in a congregate living setting in the past 14 days. She does not live with a healthcare worker.   Natalie has not had a close " contact with a laboratory-confirmed COVID-19 patient within 14 days of symptom onset.   Since December 2019, Summer and has not had upper respiratory infection or influenza-like illness. Has not been diagnosed with lab-confirmed COVID-19 test   Pertinent medical history  Summer has taken an antibiotic medication in the past month. Antibiotic details as reported by the patient (free text): Vancomycin for C diff   Summer does not get yeast infections when she takes antibiotics.   Summer does not need a return to work/school note.   Weight: 177 lbs   Summer does not smoke or use smokeless tobacco.   She denies pregnancy and denies breastfeeding. She has menstruated in the past month.   Weight: 177 lbs    MEDICATIONS: azathioprine oral, Entyvio intravenous, ALLERGIES: Sulfa (Sulfonamide Antibiotics)  Clinician Response:  Dear Summer,   Your symptoms show that you may have coronavirus (COVID-19). This illness can cause fever, cough and trouble breathing. Many people get a mild case and get better on their own. Some people can get very sick.  What should I do?  We would like to test you for this virus.   1. Please call [phone] to schedule your visit. Explain that you were referred by [facility] to have a COVID-19 test. Be ready to share your [facility] visit ID number.  The following will serve as your written order for this COVID Test, ordered by me, for the indication of suspected COVID [Z20.828]: The test will be ordered in [facility], our electronic health record, after you are scheduled. It will show as ordered and authorized by Robinson García MD.  Order: COVID-19 (Coronavirus) PCR for SYMPTOMATIC testing from [facility].      2. When it's time for your COVID test:  Stay at least 6 feet away from others. (If someone will drive you to your test, stay in the backseat, as far away from the  as you can.)   Cover your mouth and nose with a mask, tissue or washcloth.  Go straight to the testing site. Don't make any stops on the way  "there or back.      3.Starting now: Stay home and away from others (self-isolate) until:   You've had no fever---and no medicine that reduces fever---for one full day (24 hours). And...   Your other symptoms have gotten better. For example, your cough or breathing has improved. And...   At least 10 days have passed since your symptoms started.       During this time, don't leave the house except for testing or medical care.   Stay in your own room, even for meals. Use your own bathroom if you can.   Stay away from others in your home. No hugging, kissing or shaking hands. No visitors.  Don't go to work, school or anywhere else.    Clean \"high touch\" surfaces often (doorknobs, counters, handles, etc.). Use a household cleaning spray or wipes. You'll find a full list of  on the EPA website: www.epa.gov/pesticide-registration/list-n-disinfectants-use-against-sars-cov-2.   Cover your mouth and nose with a mask, tissue or washcloth to avoid spreading germs.  Wash your hands and face often. Use soap and water.  Caregivers in these groups are at risk for severe illness due to COVID-19:  o People 65 years and older  o People who live in a nursing home or long-term care facility  o People with chronic disease (lung, heart, cancer, diabetes, kidney, liver, immunologic)  o People who have a weakened immune system, including those who:   Are in cancer treatment  Take medicine that weakens the immune system, such as corticosteroids  Had a bone marrow or organ transplant  Have an immune deficiency  Have poorly controlled HIV or AIDS  Are obese (body mass index of 40 or higher)  Smoke regularly   o Caregivers should wear gloves while washing dishes, handling laundry and cleaning bedrooms and bathrooms.  o Use caution when washing and drying laundry: Don't shake dirty laundry, and use the warmest water setting that you can.  o For more tips, go to www.cdc.gov/coronavirus/2019-ncov/downloads/10Things.pdf.    4.Sign up for " [facility]. We know it's scary to hear that you might have COVID-19. We want to track your symptoms to make sure you're okay over the next 2 weeks. Please look for an email from [facility]---this is a free, online program that we'll use to keep in touch. To sign up, follow the link in the email. Learn more at [email or web address]  How can I take care of myself?   Get lots of rest. Drink extra fluids (unless a doctor has told you not to).   Take Tylenol (acetaminophen) for fever or pain. If you have liver or kidney problems, ask your family doctor if it's okay to take Tylenol.   Adults can take either:    650 mg (two 325 mg pills) every 4 to 6 hours, or...   1,000 mg (two 500 mg pills) every 8 hours as needed.    Note: Don't take more than 3,000 mg in one day. Acetaminophen is found in many medicines (both prescribed and over-the-counter medicines). Read all labels to be sure you don't take too much.   For children, check the Tylenol bottle for the right dose. The dose is based on the child's age or weight.    If you have other health problems (like cancer, heart failure, an organ transplant or severe kidney disease): Call your specialty clinic if you don't feel better in the next 2 days.       Know when to call 911. Emergency warning signs include:    Trouble breathing or shortness of breath Pain or pressure in the chest that doesn't go away Feeling confused like you haven't felt before, or not being able to wake up Bluish-colored lips or face.  Where can I get more information?    [facility] -- About COVID-19: [email or web address]   CDC -- What to Do If You're Sick: www.cdc.gov/coronavirus/2019-ncov/about/steps-when-sick.html   CDC -- Ending Home Isolation: www.cdc.gov/coronavirus/2019-ncov/hcp/disposition-in-home-patients.html   CDC -- Caring for Someone: www.cdc.gov/coronavirus/2019-ncov/if-you-are-sick/care-for-someone.html   [facility] -- Interim Guidance for Hospital Discharge to  Home: www.health.[facility].[place]/diseases/coronavirus/hcp/hospdischarge.pdf   [facility] clinical trials (COVID-19 research studies): clinicalaffairs.[facility]/umn-clinical-trials    Below are the COVID-19 hotlines at the [place] Department of Health ([facility]). Interpreters are available.    For health questions: Call 629-748-1413 or 1-264.780.8542 (7 a.m. to 7 p.m.) For questions about schools and childcare: Call 671-241-1323 or 1-165.376.2957 (7 a.m. to 7 p.m.)    Diagnosis: Acute upper respiratory infection, unspecified  Diagnosis ICD: J06.9  Additional Clinician Notes:  If your symptoms are not improving or worsen please go to one of our urgent care locations for evaluation and possible strep testing&nbsp;

## 2020-10-11 DIAGNOSIS — Z20.822 SUSPECTED COVID-19 VIRUS INFECTION: ICD-10-CM

## 2020-10-11 PROCEDURE — U0003 INFECTIOUS AGENT DETECTION BY NUCLEIC ACID (DNA OR RNA); SEVERE ACUTE RESPIRATORY SYNDROME CORONAVIRUS 2 (SARS-COV-2) (CORONAVIRUS DISEASE [COVID-19]), AMPLIFIED PROBE TECHNIQUE, MAKING USE OF HIGH THROUGHPUT TECHNOLOGIES AS DESCRIBED BY CMS-2020-01-R: HCPCS | Performed by: PHYSICIAN ASSISTANT

## 2020-10-12 LAB
SARS-COV-2 RNA SPEC QL NAA+PROBE: NOT DETECTED
SPECIMEN SOURCE: NORMAL

## 2020-10-21 ENCOUNTER — PATIENT OUTREACH (OUTPATIENT)
Dept: GASTROENTEROLOGY | Facility: CLINIC | Age: 30
End: 2020-10-21

## 2020-10-23 ENCOUNTER — HOSPITAL ENCOUNTER (OUTPATIENT)
Facility: CLINIC | Age: 30
Setting detail: SPECIMEN
Discharge: HOME | End: 2020-10-23
Attending: INTERNAL MEDICINE | Admitting: PHYSICIAN ASSISTANT
Payer: COMMERCIAL

## 2020-10-23 ENCOUNTER — INFUSION THERAPY VISIT (OUTPATIENT)
Dept: INFUSION THERAPY | Facility: CLINIC | Age: 30
End: 2020-10-23
Attending: INTERNAL MEDICINE
Payer: COMMERCIAL

## 2020-10-23 VITALS
HEART RATE: 80 BPM | RESPIRATION RATE: 16 BRPM | TEMPERATURE: 97.2 F | SYSTOLIC BLOOD PRESSURE: 120 MMHG | OXYGEN SATURATION: 98 % | DIASTOLIC BLOOD PRESSURE: 74 MMHG

## 2020-10-23 DIAGNOSIS — K50.10 CROHN'S DISEASE OF LARGE INTESTINE WITHOUT COMPLICATION (H): Primary | ICD-10-CM

## 2020-10-23 DIAGNOSIS — Z11.59 ENCOUNTER FOR SCREENING FOR OTHER VIRAL DISEASES: Primary | ICD-10-CM

## 2020-10-23 LAB
ALBUMIN SERPL-MCNC: 3.4 G/DL (ref 3.4–5)
ALP SERPL-CCNC: 54 U/L (ref 40–150)
ALT SERPL W P-5'-P-CCNC: 18 U/L (ref 0–50)
AST SERPL W P-5'-P-CCNC: 49 U/L (ref 0–45)
BASOPHILS # BLD AUTO: 0 10E9/L (ref 0–0.2)
BASOPHILS NFR BLD AUTO: 0.6 %
BILIRUB DIRECT SERPL-MCNC: 0.2 MG/DL (ref 0–0.2)
BILIRUB SERPL-MCNC: 0.6 MG/DL (ref 0.2–1.3)
CRP SERPL-MCNC: <2.9 MG/L (ref 0–8)
DIFFERENTIAL METHOD BLD: NORMAL
EOSINOPHIL # BLD AUTO: 0.3 10E9/L (ref 0–0.7)
EOSINOPHIL NFR BLD AUTO: 6.9 %
ERYTHROCYTE [DISTWIDTH] IN BLOOD BY AUTOMATED COUNT: 12.6 % (ref 10–15)
ERYTHROCYTE [SEDIMENTATION RATE] IN BLOOD BY WESTERGREN METHOD: 9 MM/H (ref 0–20)
HCT VFR BLD AUTO: 36.6 % (ref 35–47)
HGB BLD-MCNC: 11.9 G/DL (ref 11.7–15.7)
IMM GRANULOCYTES # BLD: 0 10E9/L (ref 0–0.4)
IMM GRANULOCYTES NFR BLD: 0.4 %
LYMPHOCYTES # BLD AUTO: 1.1 10E9/L (ref 0.8–5.3)
LYMPHOCYTES NFR BLD AUTO: 23.8 %
MCH RBC QN AUTO: 31.3 PG (ref 26.5–33)
MCHC RBC AUTO-ENTMCNC: 32.5 G/DL (ref 31.5–36.5)
MCV RBC AUTO: 96 FL (ref 78–100)
MONOCYTES # BLD AUTO: 0.3 10E9/L (ref 0–1.3)
MONOCYTES NFR BLD AUTO: 7.1 %
NEUTROPHILS # BLD AUTO: 2.8 10E9/L (ref 1.6–8.3)
NEUTROPHILS NFR BLD AUTO: 61.2 %
NRBC # BLD AUTO: 0 10*3/UL
NRBC BLD AUTO-RTO: 0 /100
PLATELET # BLD AUTO: 281 10E9/L (ref 150–450)
PROT SERPL-MCNC: 7.6 G/DL (ref 6.8–8.8)
RBC # BLD AUTO: 3.8 10E12/L (ref 3.8–5.2)
WBC # BLD AUTO: 4.6 10E9/L (ref 4–11)

## 2020-10-23 PROCEDURE — 36415 COLL VENOUS BLD VENIPUNCTURE: CPT

## 2020-10-23 PROCEDURE — 96365 THER/PROPH/DIAG IV INF INIT: CPT

## 2020-10-23 PROCEDURE — 85025 COMPLETE CBC W/AUTO DIFF WBC: CPT | Performed by: PHYSICIAN ASSISTANT

## 2020-10-23 PROCEDURE — 86140 C-REACTIVE PROTEIN: CPT | Performed by: PHYSICIAN ASSISTANT

## 2020-10-23 PROCEDURE — 85652 RBC SED RATE AUTOMATED: CPT | Performed by: PHYSICIAN ASSISTANT

## 2020-10-23 PROCEDURE — 250N000011 HC RX IP 250 OP 636: Performed by: PHYSICIAN ASSISTANT

## 2020-10-23 PROCEDURE — 258N000003 HC RX IP 258 OP 636: Performed by: PHYSICIAN ASSISTANT

## 2020-10-23 PROCEDURE — 80076 HEPATIC FUNCTION PANEL: CPT | Performed by: PHYSICIAN ASSISTANT

## 2020-10-23 RX ADMIN — VEDOLIZUMAB 300 MG: 300 INJECTION, POWDER, LYOPHILIZED, FOR SOLUTION INTRAVENOUS at 08:21

## 2020-10-23 NOTE — PROGRESS NOTES
Infusion Nursing Note:  Natalie Villa presents today for labs and entyvio.    Patient seen by provider today: No   present during visit today: Not Applicable.    Note: N/A.    Intravenous Access:  Lab draw site left AC, Needle type IV, Gauge 22.  Labs drawn without difficulty.  Peripheral IV placed.    Treatment Conditions:  Results reviewed, labs MET treatment parameters, ok to proceed with treatment.  Biological Infusion Checklist:  ~~~ NOTE: If the patient answers yes to any of the questions below, hold the infusion and contact ordering provider or on-call provider.    1. Have you recently had an elevated temperature, fever, chills, productive cough, coughing for 3 weeks or longer or hemoptysis, abnormal vital signs, night sweats,  chest pain or have you noticed a decrease in your appetite, unexplained weight loss or fatigue? No  2. Do you have any open wounds or new incisions? No  3. Do you have any recent or upcoming hospitalizations, surgeries or dental procedures? No  4. Do you currently have or recently have had any signs of illness or infection or are you on any antibiotics? Yes, On Vanco for c-diff  5. Have you had any new, sudden or worsening abdominal pain? No  6. Have you or anyone in your household received a live vaccination in the past 4 weeks? Please note:  No live vaccines while on biologic/chemotherapy until 6 months after the last treatment.  Patient can receive the flu vaccine (shot only) and the pneumovax.  It is optimal for the patient to get these vaccines mid cycle, but they can be given at any time as long as it is not on the day of the infusion. No  7. Have you recently been diagnosed with any new nervous system diseases (ie. Multiple sclerosis, Guillain [place], seizures, neurological changes) or cancer diagnosis? No  8. Are you on any form of radiation or chemotherapy? No  9. Are you pregnant or breast feeding or do you have plans of pregnancy in the future? No  10. Have you  been having any signs of worsening depression or suicidal ideations?  (benlysta only) No  11. Have there been any other new onset medical symptoms? No    Post Infusion Assessment:  Patient tolerated infusion without incident.  Blood return noted pre and post infusion.  Site patent and intact, free from redness, edema or discomfort.  No evidence of extravasations.  Access discontinued per protocol.  Biologic Infusion Post Education: Call the triage nurse at your clinic or seek medical attention if you have chills and/or temperature greater than or equal to 100.5, uncontrolled nausea/vomiting, diarrhea, constipation, dizziness, shortness of breath, chest pain, heart palpitations, weakness or any other new or concerning symptoms, questions or concerns.  You cannot have any live virus vaccines prior to or during treatment or up to 6 months post infusion.  If you have an upcoming surgery, medical procedure or dental procedure during treatment, this should be discussed with your ordering physician and your surgeon/dentist.  If you are having any concerning symptom, if you are unsure if you should get your next infusion or wish to speak to a provider before your next infusion, please call your care coordinator or triage nurse at your clinic to notify them so we can adequately serve you.         Discharge Plan:   Discharge instructions reviewed with: Patient.  Patient and/or family verbalized understanding of discharge instructions and all questions answered.  AVS to patient via [employer].  Patient will call for next appointment.   Patient discharged in stable condition accompanied by: self.  Departure Mode: Ambulatory.    Leila Márquez RN

## 2020-10-26 DIAGNOSIS — K50.10 CROHN'S DISEASE OF LARGE INTESTINE WITHOUT COMPLICATION (H): Primary | ICD-10-CM

## 2020-10-26 NOTE — PROGRESS NOTES
Order placed for the patient to have a c.diff testing along with a FCP testing will be mailed tot he patent

## 2020-11-18 ENCOUNTER — ANESTHESIA EVENT (OUTPATIENT)
Dept: SURGERY | Facility: AMBULATORY SURGERY CENTER | Age: 30
End: 2020-11-18

## 2020-11-19 DIAGNOSIS — Z11.59 ENCOUNTER FOR SCREENING FOR OTHER VIRAL DISEASES: ICD-10-CM

## 2020-11-19 DIAGNOSIS — K50.10 CROHN'S DISEASE OF LARGE INTESTINE WITHOUT COMPLICATION (H): ICD-10-CM

## 2020-11-19 DIAGNOSIS — K50.10 CROHN'S DISEASE OF COLON WITHOUT COMPLICATION (H): ICD-10-CM

## 2020-11-19 DIAGNOSIS — A04.72 COLITIS DUE TO CLOSTRIDIUM DIFFICILE: ICD-10-CM

## 2020-11-19 LAB
C DIFF TOX B STL QL: NEGATIVE
SPECIMEN SOURCE: NORMAL

## 2020-11-19 PROCEDURE — 99000 SPECIMEN HANDLING OFFICE-LAB: CPT | Performed by: PATHOLOGY

## 2020-11-19 PROCEDURE — U0003 INFECTIOUS AGENT DETECTION BY NUCLEIC ACID (DNA OR RNA); SEVERE ACUTE RESPIRATORY SYNDROME CORONAVIRUS 2 (SARS-COV-2) (CORONAVIRUS DISEASE [COVID-19]), AMPLIFIED PROBE TECHNIQUE, MAKING USE OF HIGH THROUGHPUT TECHNOLOGIES AS DESCRIBED BY CMS-2020-01-R: HCPCS | Mod: 90 | Performed by: PATHOLOGY

## 2020-11-19 PROCEDURE — 83993 ASSAY FOR CALPROTECTIN FECAL: CPT | Mod: 90 | Performed by: PATHOLOGY

## 2020-11-19 PROCEDURE — 87493 C DIFF AMPLIFIED PROBE: CPT | Mod: 90 | Performed by: PATHOLOGY

## 2020-11-20 ENCOUNTER — INFUSION THERAPY VISIT (OUTPATIENT)
Dept: INFUSION THERAPY | Facility: CLINIC | Age: 30
End: 2020-11-20
Attending: INTERNAL MEDICINE
Payer: COMMERCIAL

## 2020-11-20 VITALS
SYSTOLIC BLOOD PRESSURE: 119 MMHG | OXYGEN SATURATION: 98 % | HEART RATE: 58 BPM | RESPIRATION RATE: 16 BRPM | DIASTOLIC BLOOD PRESSURE: 78 MMHG | TEMPERATURE: 97.4 F

## 2020-11-20 DIAGNOSIS — K50.10 CROHN'S DISEASE OF LARGE INTESTINE WITHOUT COMPLICATION (H): Primary | ICD-10-CM

## 2020-11-20 LAB
CALPROTECTIN STL-MCNT: 517 MG/KG (ref 0–49.9)
SARS-COV-2 RNA SPEC QL NAA+PROBE: NOT DETECTED
SPECIMEN SOURCE: NORMAL

## 2020-11-20 PROCEDURE — 258N000003 HC RX IP 258 OP 636: Performed by: PHYSICIAN ASSISTANT

## 2020-11-20 PROCEDURE — 250N000011 HC RX IP 250 OP 636: Performed by: PHYSICIAN ASSISTANT

## 2020-11-20 PROCEDURE — 96365 THER/PROPH/DIAG IV INF INIT: CPT

## 2020-11-20 RX ADMIN — VEDOLIZUMAB 300 MG: 300 INJECTION, POWDER, LYOPHILIZED, FOR SOLUTION INTRAVENOUS at 08:26

## 2020-11-20 NOTE — PROGRESS NOTES
Infusion Nursing Note:  Natalie Villa presents today for Entyvio.    Patient seen by provider today: No   present during visit today: Not Applicable.    Note: Labs not due this infusion.    Intravenous Access:  Peripheral IV placed.    Treatment Conditions:  Biological Infusion Checklist:  ~~~ NOTE: If the patient answers yes to any of the questions below, hold the infusion and contact ordering provider or on-call provider.    1. Have you recently had an elevated temperature, fever, chills, productive cough, coughing for 3 weeks or longer or hemoptysis, abnormal vital signs, night sweats,  chest pain or have you noticed a decrease in your appetite, unexplained weight loss or fatigue? No  2. Do you have any open wounds or new incisions? No  3. Do you have any recent or upcoming hospitalizations, surgeries or dental procedures? No  4. Do you currently have or recently have had any signs of illness or infection or are you on any antibiotics? No  5. Have you had any new, sudden or worsening abdominal pain? No  6. Have you or anyone in your household received a live vaccination in the past 4 weeks? Please note:  No live vaccines while on biologic/chemotherapy until 6 months after the last treatment.  Patient can receive the flu vaccine (shot only) and the pneumovax.  It is optimal for the patient to get these vaccines mid cycle, but they can be given at any time as long as it is not on the day of the infusion. No  7. Have you recently been diagnosed with any new nervous system diseases (ie. Multiple sclerosis, Guillain [place], seizures, neurological changes) or cancer diagnosis? No  8. Are you on any form of radiation or chemotherapy? No  9. Are you pregnant or breast feeding or do you have plans of pregnancy in the future? No  10. Have you been having any signs of worsening depression or suicidal ideations?  (benlysta only) No  11. Have there been any other new onset medical symptoms? No        Post  Infusion Assessment:  Patient tolerated infusion without incident.  Blood return noted pre and post infusion.  Site patent and intact, free from redness, edema or discomfort.  No evidence of extravasations.  Access discontinued per protocol.       Discharge Plan:   Copy of AVS reviewed with patient and/or family.  Patient will return 12/18/20 for next appointment.  Patient discharged in stable condition accompanied by: self.  Departure Mode: Ambulatory.    Kary Saenz RN

## 2020-11-23 ENCOUNTER — ANESTHESIA (OUTPATIENT)
Dept: SURGERY | Facility: AMBULATORY SURGERY CENTER | Age: 30
End: 2020-11-23

## 2020-11-23 ENCOUNTER — HOSPITAL ENCOUNTER (OUTPATIENT)
Facility: AMBULATORY SURGERY CENTER | Age: 30
Discharge: HOME | End: 2020-11-23
Attending: INTERNAL MEDICINE | Admitting: INTERNAL MEDICINE
Payer: COMMERCIAL

## 2020-11-23 VITALS
SYSTOLIC BLOOD PRESSURE: 137 MMHG | BODY MASS INDEX: 29.41 KG/M2 | DIASTOLIC BLOOD PRESSURE: 93 MMHG | WEIGHT: 183 LBS | TEMPERATURE: 97.2 F | OXYGEN SATURATION: 100 % | HEIGHT: 66 IN | RESPIRATION RATE: 20 BRPM | HEART RATE: 95 BPM

## 2020-11-23 VITALS — HEART RATE: 76 BPM

## 2020-11-23 LAB
COLONOSCOPY: NORMAL
HCG UR QL: NEGATIVE
INTERNAL QC OK POCT: NO

## 2020-11-23 PROCEDURE — 88342 IMHCHEM/IMCYTCHM 1ST ANTB: CPT | Performed by: PATHOLOGY

## 2020-11-23 PROCEDURE — 45385 COLONOSCOPY W/LESION REMOVAL: CPT

## 2020-11-23 PROCEDURE — 88305 TISSUE EXAM BY PATHOLOGIST: CPT | Performed by: PATHOLOGY

## 2020-11-23 PROCEDURE — 45380 COLONOSCOPY AND BIOPSY: CPT | Mod: 59

## 2020-11-23 RX ORDER — ONDANSETRON 4 MG/1
4 TABLET, ORALLY DISINTEGRATING ORAL EVERY 30 MIN PRN
Status: DISCONTINUED | OUTPATIENT
Start: 2020-11-23 | End: 2020-11-24 | Stop reason: HOSPADM

## 2020-11-23 RX ORDER — NALOXONE HYDROCHLORIDE 0.4 MG/ML
0.2 INJECTION, SOLUTION INTRAMUSCULAR; INTRAVENOUS; SUBCUTANEOUS
Status: CANCELLED | OUTPATIENT
Start: 2020-11-23

## 2020-11-23 RX ORDER — NALOXONE HYDROCHLORIDE 0.4 MG/ML
0.4 INJECTION, SOLUTION INTRAMUSCULAR; INTRAVENOUS; SUBCUTANEOUS
Status: CANCELLED | OUTPATIENT
Start: 2020-11-23

## 2020-11-23 RX ORDER — FLUMAZENIL 0.1 MG/ML
0.2 INJECTION, SOLUTION INTRAVENOUS
Status: CANCELLED | OUTPATIENT
Start: 2020-11-23 | End: 2020-11-24

## 2020-11-23 RX ORDER — NALOXONE HYDROCHLORIDE 0.4 MG/ML
0.2 INJECTION, SOLUTION INTRAMUSCULAR; INTRAVENOUS; SUBCUTANEOUS
Status: DISCONTINUED | OUTPATIENT
Start: 2020-11-23 | End: 2020-11-24 | Stop reason: HOSPADM

## 2020-11-23 RX ORDER — ONDANSETRON 4 MG/1
4 TABLET, ORALLY DISINTEGRATING ORAL EVERY 6 HOURS PRN
Status: CANCELLED | OUTPATIENT
Start: 2020-11-23

## 2020-11-23 RX ORDER — LIDOCAINE 40 MG/G
CREAM TOPICAL
Status: DISCONTINUED | OUTPATIENT
Start: 2020-11-23 | End: 2020-11-24 | Stop reason: HOSPADM

## 2020-11-23 RX ORDER — SODIUM CHLORIDE, SODIUM LACTATE, POTASSIUM CHLORIDE, CALCIUM CHLORIDE 600; 310; 30; 20 MG/100ML; MG/100ML; MG/100ML; MG/100ML
INJECTION, SOLUTION INTRAVENOUS CONTINUOUS
Status: DISCONTINUED | OUTPATIENT
Start: 2020-11-23 | End: 2020-11-24 | Stop reason: HOSPADM

## 2020-11-23 RX ORDER — ONDANSETRON 2 MG/ML
4 INJECTION INTRAMUSCULAR; INTRAVENOUS EVERY 6 HOURS PRN
Status: CANCELLED | OUTPATIENT
Start: 2020-11-23

## 2020-11-23 RX ORDER — NALOXONE HYDROCHLORIDE 0.4 MG/ML
0.4 INJECTION, SOLUTION INTRAMUSCULAR; INTRAVENOUS; SUBCUTANEOUS
Status: DISCONTINUED | OUTPATIENT
Start: 2020-11-23 | End: 2020-11-24 | Stop reason: HOSPADM

## 2020-11-23 RX ORDER — PROPOFOL 10 MG/ML
INJECTION, EMULSION INTRAVENOUS CONTINUOUS PRN
Status: DISCONTINUED | OUTPATIENT
Start: 2020-11-23 | End: 2020-11-23

## 2020-11-23 RX ORDER — MEPERIDINE HYDROCHLORIDE 25 MG/ML
12.5 INJECTION INTRAMUSCULAR; INTRAVENOUS; SUBCUTANEOUS
Status: DISCONTINUED | OUTPATIENT
Start: 2020-11-23 | End: 2020-11-24 | Stop reason: HOSPADM

## 2020-11-23 RX ORDER — ONDANSETRON 2 MG/ML
4 INJECTION INTRAMUSCULAR; INTRAVENOUS EVERY 30 MIN PRN
Status: DISCONTINUED | OUTPATIENT
Start: 2020-11-23 | End: 2020-11-24 | Stop reason: HOSPADM

## 2020-11-23 RX ORDER — SIMETHICONE
LIQUID (ML) MISCELLANEOUS PRN
Status: DISCONTINUED | OUTPATIENT
Start: 2020-11-23 | End: 2020-11-23 | Stop reason: HOSPADM

## 2020-11-23 RX ORDER — PROCHLORPERAZINE MALEATE 10 MG
10 TABLET ORAL EVERY 6 HOURS PRN
Status: CANCELLED | OUTPATIENT
Start: 2020-11-23

## 2020-11-23 RX ORDER — PROPOFOL 10 MG/ML
INJECTION, EMULSION INTRAVENOUS PRN
Status: DISCONTINUED | OUTPATIENT
Start: 2020-11-23 | End: 2020-11-23

## 2020-11-23 RX ORDER — ONDANSETRON 2 MG/ML
4 INJECTION INTRAMUSCULAR; INTRAVENOUS
Status: DISCONTINUED | OUTPATIENT
Start: 2020-11-23 | End: 2020-11-24 | Stop reason: HOSPADM

## 2020-11-23 RX ADMIN — SODIUM CHLORIDE, SODIUM LACTATE, POTASSIUM CHLORIDE, CALCIUM CHLORIDE: 600; 310; 30; 20 INJECTION, SOLUTION INTRAVENOUS at 13:52

## 2020-11-23 RX ADMIN — PROPOFOL 50 MG: 10 INJECTION, EMULSION INTRAVENOUS at 13:59

## 2020-11-23 RX ADMIN — PROPOFOL 150 MCG/KG/MIN: 10 INJECTION, EMULSION INTRAVENOUS at 13:59

## 2020-11-23 ASSESSMENT — MIFFLIN-ST. JEOR: SCORE: 1566.83

## 2020-11-23 ASSESSMENT — LIFESTYLE VARIABLES: TOBACCO_USE: 0

## 2020-11-23 NOTE — H&P
Natalie JAMES Allen  [id number]  female  30 year old      Reason for procedure/surgery: inflammatory bowel disease    Patient Active Problem List   Diagnosis     Crohn's disease of large intestine without complication (H)     Herpes zoster without complication     ASCUS on Pap smear     Chronic tonsillitis     Deep vein thrombosis (DVT) of distal vein of left lower extremity (H)     Microcytic anemia     Vitamin D deficiency     Exercise-induced asthma       Past Surgical History:  History reviewed. No pertinent surgical history.    Past Medical History: No past medical history on file.    Social History:   Social History     Tobacco Use     Smoking status: Never Smoker     Smokeless tobacco: Never Used     Tobacco comment: Pt does not smoke but lives with a smoker (mom)    Substance Use Topics     Alcohol use: Yes     Comment: socially       Family History: History reviewed. No pertinent family history.    Allergies:   Allergies   Allergen Reactions     Sulfa Drugs Nausea     Gi upset       Active Medications:   Current Outpatient Medications   Medication Sig Dispense Refill     albuterol (PROAIR HFA/PROVENTIL HFA/VENTOLIN HFA) 108 (90 Base) MCG/ACT inhaler Inhale 2 puffs into the lungs every 6 hours 3 Inhaler 1     azaTHIOprine (IMURAN) 50 MG tablet Take 1.5 tablets (75 mg) by mouth 2 times daily 270 tablet 3     calcium carbonate (OS-ELIEL) 500 MG tablet Take 1 tablet by mouth daily        vedolizumab (ENTYVIO) 60 MG/ML injection Inject 300 mg into the vein every 28 days        acetaminophen (TYLENOL) 325 MG tablet Take 325-650 mg by mouth every 6 hours as needed for mild pain       vancomycin (VANCOCIN) 125 MG capsule Take 1 capsule (125 mg) by mouth 2 times daily Continue until told to decrease or stop 60 capsule 3     Vitamin D, Cholecalciferol, 1000 units CAPS Take 2 tablets by mouth daily          Systemic Review:   CONSTITUTIONAL: NEGATIVE for fever, chills, change in weight  ENT/MOUTH: NEGATIVE for ear, mouth  "and throat problems  RESP: NEGATIVE for significant cough or SOB  CV: NEGATIVE for chest pain, palpitations or peripheral edema    Physical Examination:   Vital Signs: BP (!) 137/93   Pulse 95   Temp 97.2  F (36.2  C) (Temporal)   Resp 20   Ht 1.676 m (5' 6\")   Wt 83 kg (183 lb)   SpO2 100%   BMI 29.54 kg/m    GENERAL: healthy, alert and no distress  NECK: no adenopathy, no asymmetry, masses, or scars  RESP: lungs clear to auscultation - no rales, rhonchi or wheezes  CV: regular rate and rhythm, normal S1 S2, no S3 or S4, no murmur, click or rub, no peripheral edema and peripheral pulses strong  ABDOMEN: soft, nontender, no hepatosplenomegaly, no masses and bowel sounds normal  MS: no gross musculoskeletal defects noted, no edema    Plan: Appropriate to proceed as scheduled.      Lucia Berger MD  11/23/2020    PCP:  [facility]    "

## 2020-11-23 NOTE — ANESTHESIA PREPROCEDURE EVALUATION
"Anesthesia Pre-Procedure Evaluation    Patient: Natalie Villa   MRN:     [id number] Gender:   female   Age:    30 year old :      1990        Preoperative Diagnosis: Crohn's disease of colon without complication (H) [K50.10]   Procedure(s):  COLONOSCOPY     LABS:  CBC:   Lab Results   Component Value Date    WBC 4.6 10/23/2020    WBC 4.6 2020    HGB 11.9 10/23/2020    HGB 11.9 2020    HCT 36.6 10/23/2020    HCT 37.0 2020     10/23/2020     2020     BMP:   Lab Results   Component Value Date     2018    POTASSIUM 4.0 2018    CHLORIDE 104 2018    CO2 20 2018    BUN 17 2018    CR 0.68 2018    GLC 93 2018     COAGS: No results found for: PTT, INR, FIBR  POC:   Lab Results   Component Value Date    HCG Negative 2020     OTHER:   Lab Results   Component Value Date    ELIEL 9.0 2018    ALBUMIN 3.4 10/23/2020    PROTTOTAL 7.6 10/23/2020    ALT 18 10/23/2020    AST 49 (H) 10/23/2020    ALKPHOS 54 10/23/2020    BILITOTAL 0.6 10/23/2020    LIPASE 144 2018    TSH 1.93 2018    CRP <2.9 10/23/2020    SED 9 10/23/2020        Preop Vitals    BP Readings from Last 3 Encounters:   20 110/82   20 119/78   10/23/20 120/74    Pulse Readings from Last 3 Encounters:   20 95   20 58   10/23/20 80      Resp Readings from Last 3 Encounters:   20 18   20 16   10/23/20 16    SpO2 Readings from Last 3 Encounters:   20 96%   20 98%   10/23/20 98%      Temp Readings from Last 1 Encounters:   20 36.5  C (97.7  F) (Temporal)    Ht Readings from Last 1 Encounters:   20 1.676 m (5' 6\")      Wt Readings from Last 1 Encounters:   20 83 kg (183 lb)    Estimated body mass index is 29.54 kg/m  as calculated from the following:    Height as of this encounter: 1.676 m (5' 6\").    Weight as of this encounter: 83 kg (183 lb).     LDA:        No past medical history on file. "   History reviewed. No pertinent surgical history.   Allergies   Allergen Reactions     Sulfa Drugs Nausea     Gi upset        Anesthesia Evaluation     . Pt has had prior anesthetic.     No history of anesthetic complications          ROS/MED HX    ENT/Pulmonary:      (-) tobacco use   Neurologic:  - neg neurologic ROS     Cardiovascular:  - neg cardiovascular ROS   (+) ----. : . . . :. . No previous cardiac testing       METS/Exercise Tolerance:  >4 METS   Hematologic:  - neg hematologic  ROS       Musculoskeletal:  - neg musculoskeletal ROS       GI/Hepatic:     (+) Inflammatory bowel disease,       Renal/Genitourinary:  - ROS Renal section negative       Endo:  - neg endo ROS       Psychiatric:  - neg psychiatric ROS       Infectious Disease:  - neg infectious disease ROS       Malignancy:      - no malignancy   Other:    - neg other ROS                     PHYSICAL EXAM:   Mental Status/Neuro: A/A/O   Airway: Facies: Feasible  Mallampati: I  Mouth/Opening: Full  TM distance: > 6 cm  Neck ROM: Full   Respiratory: Auscultation: CTAB     Resp. Rate: Normal     Resp. Effort: Normal      CV: Rhythm: Regular  Rate: Age appropriate  Heart: Normal Sounds  Edema: None   Comments:      Dental: Normal Dentition                Assessment:   ASA SCORE: 2    H&P: History and physical reviewed and following examination; no interval change.   Smoking Status:  Non-Smoker/Unknown   NPO Status: NPO Appropriate     Plan:   Anes. Type:  MAC   Pre-Medication: None   Induction:  N/a   Airway: Native Airway   Access/Monitoring: PIV   Maintenance: N/a     Postop Plan:   Postop Pain: None  Postop Sedation/Airway: Not planned  Disposition: Outpatient     PONV Management:   Adult Risk Factors: Female, Non-Smoker   Prevention: Ondansetron     CONSENT: Direct conversation   Plan and risks discussed with: Patient   Blood Products: N/a                   Marlo Cannon DO

## 2020-11-29 LAB — COPATH REPORT: NORMAL

## 2020-12-01 ENCOUNTER — VIRTUAL VISIT (OUTPATIENT)
Dept: GASTROENTEROLOGY | Facility: CLINIC | Age: 30
End: 2020-12-01
Payer: COMMERCIAL

## 2020-12-01 VITALS — BODY MASS INDEX: 29.41 KG/M2 | HEIGHT: 66 IN | WEIGHT: 183 LBS

## 2020-12-01 DIAGNOSIS — K50.10 CROHN'S DISEASE OF LARGE INTESTINE WITHOUT COMPLICATION (H): Primary | ICD-10-CM

## 2020-12-01 PROCEDURE — 99214 OFFICE O/P EST MOD 30 MIN: CPT | Mod: 95 | Performed by: INTERNAL MEDICINE

## 2020-12-01 ASSESSMENT — MIFFLIN-ST. JEOR: SCORE: 1566.83

## 2020-12-01 ASSESSMENT — PAIN SCALES - GENERAL: PAINLEVEL: NO PAIN (0)

## 2020-12-01 NOTE — PROGRESS NOTES
"Natalie Villa is a 30 year old female who is being evaluated via a billable video visit.      The patient has been notified of following:     \"This video visit will be conducted via a call between you and your physician/provider. We have found that certain health care needs can be provided without the need for an in-person physical exam.  This service lets us provide the care you need with a video conversation.  If a prescription is necessary we can send it directly to your pharmacy.  If lab work is needed we can place an order for that and you can then stop by our lab to have the test done at a later time.    Video visits are billed at different rates depending on your insurance coverage.  Please reach out to your insurance provider with any questions.    If during the course of the call the physician/provider feels a video visit is not appropriate, you will not be charged for this service.\"    Patient has given verbal consent for Video visit? Yes  How would you like to obtain your AVS? MyChart  If you are dropped from the video visit, the video invite should be resent to: Text to cell phone: [phone]  Will anyone else be joining your video visit? No        GI CLINIC VISIT - ESTABLISHED PATIENT    CC/REFERRING PROVIDER: Referred Self  REASON FOR CONSULTATION: IBD follow up  Sea [place]  HPI: 28 year old female with history of Crohn's vs UC on Vedolizumab (failed adalimumab, and questionable antibodies to infliximab, now on vedolizumab since 6/19/2015) + AZA, initially diagnosed with Crohn's in 2013. Her symptoms at the time were bloody diarrhea, left sided abdominal pain and general fatigue. She was treated with Humira for 1 year but stopped after colonoscopy showed ongoing disease. Then was treated with Remicade for about 18 months but per patient report, thinks she developed antibodies to it. She was started on Vedolizumab in June of 2015 initially at 300 mg Q8 weeks which was increased to Q6 weeks and mostly " recently to Q4 weeks in May 2018 after colonoscopy in April 2018 showed ongoing colitis. She has been on AZA since diagnosis, initially at 200 mg Qday which has been titrated down to 100 mg Qday.     She had her first occurrence of C. difficile infection after the stool studies were completed after last visit in October 2018.  She completed a vancomycin taper.  Second occurrence (spontaneous without abx trigger) treated with vancomycin QID x 10 days then daily. Fecal esvin elevated to ~300, repeat cscope at that time showed Borrego 1 in descending, sigmoid and rectum.     She is currently having an average of 4 stools per day that are formed.  She has started running again, and occasionally noticed stools can be more loose.  She had one incidence of blood in her stool in January without any associated symptoms.  Due to a job change, she had to miss her January infusion with vedolizumab.  She noticed blood in her stool around this time after missing her dose.    Patient has had a significant amount of changes that have contributed to increased anxiety and some depression.  She changed jobs at the end of 2019, she moved in with her boyfriend, and some stresses with family dynamics.  With these changes she is noticed increased weight gain, increased alcohol consumption and increased fatigue despite 9 hours of sleep per night.  With the change in jobs, she now works from home, and she finds it difficult to maintain a schedule and remain active.  She also notes she was a week late in her menstrual cycle around the time of when her infusion was due.    Some increased pruritus in scalp that improved with restart of Selsun Blue shampoo.  She notes a questionable new allergy to shellfish with some itchiness in her throat after eating sushi recently.  No joint manifestations.    She also continues to follow with hematology as she was diagnosed with a LLE DVT fall 2018 that was thought to be provoked and completed therapy with  "Eliquis.  She will have repeat labs this spring with follow up and determine if IV iron infusions are required. Most recently, hemoglobin normal 2/14/20.    Interval history, 5/2020 (virtual visit)  Continues on VDZ q4w + AZA 75 mg BID. Last infusion 5/8/2020 (4/10/2020). Of note, CRP was 25.   Had met with Dr. Obrien. Tried the Whole 30 diet Mar-Apr. Stool was inconsistent at that time in the s/o increased fiber use (had loose stools up to 7 x per day). Stool normalized after liberalizing diet.     Using \"GI monitor\" brandon. Having ~4 BMs per day. Minimal urgency, mixed consistency stool, no blood. No abd pain.     Reports feeling well compared to the past. Seems to be inconsistent with recent CRP elevation.     Interval history, 9/2020 (video visit)  Saw Dr. Dodson with plans for a fecal calprotectin check in the setting of suppressive vancomycin. FC was 307.   Feeling \"pretty good\".   No abd pain. Having 3-4 BMs per day (improved). No blood. No urgency. Has not been dieting and exercising as much as before.   No joint pain or rashes.   Has been sober since March; has found this challenging.   Last Entyvio infusion was 8/28    Interval history, 12/2020 (video visit)  FC was elevated and icscope findings below, with active disease.   Having 3-4 well formed BMs per day. No blood. No urgency or abd pain. Weight has been stable.     ROS: 10pt ROS performed and otherwise negative.    PERTINENT PAST MEDICAL HISTORY:  As noted above.    PREVIOUS ABDOMINAL/GYNECOLOGIC SURGERIES:  None     PREVIOUS ENDOSCOPY:  Colonoscopy 4/23/2018 at [facility]: Mild colitis starting at the transverse colon (Borrego 1), progressing to more moderate colitis in the rectosigmoid (Borrego 2). Bx showed chronic colitis. TI and right colon were unremarkable endoscopically and bx showed mild chronic activity.     Flex sig 10/2/2018 shows Borrego 2/3, continuous, biopsies show moderate chronic active colitis.    Colonoscopy 5/2019 shows  Borrego 1 in " descending, sigmoid and rectum. Bx show mild active chronic colitis with cryptitis and focal erosion.     icscope 11/2020 showed SES-CD 12  PATH  SPECIMEN(S):   A: Colon biopsies at 70 cm   B: Colon biopsy, white patch at 65 cm   C: Colon biopsies at 60 cm   D: Colon biopsies at 50 cm   E: Colon biopsies at 40 cm   F: Colon biopsies at 30 cm   G: Colon biopsies at 20 cm   H: Colon biopsies at 10 cm     FINAL DIAGNOSIS:   A. Colon, Biopsies at 70 cm:   Colonic mucosa with no granulomas, cryptitis or other histologic evidence   of active Crohn; negative for   dysplasia     B. Colon, Biopsy of White Patch at 65 cm:   Colonic mucosa with mild crypt architecture distortion, evidence of prior   injury; no granulomas, cryptitis or   other histologic evidence of active Crohn; negative for dysplasia     C. Colon, Biopsies at 60 cm:   Colonic mucosa with mild fibrosis and crypt architecture distortion,   evidence of prior injury; no granulomas,   cryptitis or other histologic evidence of active Crohn; negative for   dysplasia     D. Colon, Biopsies at 50 cm:   Chronic active colitis with occasional crypt abscess, one small granuloma,    and crypt architectural distortion;   consistent with mildly active Crohn; negative for dysplasia; report of CMV    immunohistochemistry to follow     E. Colon, Biopsies at 40 cm:   Moderately active chronic colitis with crypt abscesses and crypt   distortion; negative for dysplasia; report of   CMV immunohistochemistry to follow     F. Colon, Biopsies at 30 cm:   Moderately active chronic colitis with crypt injuries and crypt   distortion; negative for dysplasia; report of   CMV immunohistochemistry to follow     G. Colon, Biopsies at 20 cm:   Moderately active chronic colitis with crypt injuries and crypt   distortion; negative for dysplasia; report of   CMV immunohistochemistry to follow     H. Colon, Biopsies at 10 cm:   Moderately active chronic colitis with crypt injuries and crypt    distortion; negative for dysplasia; report of   CMV immunohistochemistry to follow     PERTINENT MEDICATIONS:  - Azathioprine 75 mg BID  - Vedolizumab 300 mg Q4W (last dose 9/21/18)  - Maria Victoria OCP   Medications reviewed with patient today, see Medication List/Assessment for details.  No other NSAID/anticoagulation reported by patient.  No other OTC/herbal/supplements reported by patient.    SOCIAL HISTORY:  - Works as   - Uses alcohol 1-2 times/month   - Smokes marijuana 2-3 times/week  - No tobacco   - Sexually active, uses condoms      FAMILY HISTORY:  No colon/panc/esophageal/other GI CA, no other Valverde or other HPS-related Keo. No IBD/celiac, no other AI/liver/thyroid disease. + for asthma and acne.     PHYSICAL EXAMINATION:   General appearance  Healthy appearing adult, in no acute distress     Eyes  Sclera anicteric  Pupils round and reactive to light     Ears, nose, mouth and throat  No obvious external lesions of ears and nose  Hearing intact     Neck  Symmetric  No obvious external lesions     Respiratory  Normal respiration, no use of accessory muscles      MSK  Gait normal     Skin  No rashes or jaundice      Psychiatric  Oriented to person, place and time  Appropriate mood and affect.     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  Today we discussed various options, including Stelara, Cimzia, Simponi, ozanimod clinical trial, tofacitinib. We decided to move forward with Stelara.     We discussed the risks, benefits and alternatives to Stelara (ustekinumab) for Crohn's disease. Risks discussed include risk of infections and reactivation of latent infections. Serious infections (bacterial, fungal and viral) were observed in clinical trials, but not at increased rates over placebo. There is a theoretical risk for mycobacterial infections and salmonella. Pre-treatment evaluation for TB will be done to minimize risk.  Malignancies were reported in some patients in clinical trials, but not at increased frequency compared to placebo. Hypersensitivity reactions, including anaphylaxis, are reported but are rare. One case of reversible posterior leukoencephalopathy syndrome (RPLS) was observed in clinical studies of psoriasis and psoriatic arthritis, however no cases have been observed in Crohn's disease. Patients to be treated with Stelara should have all age-appropriate immunizations, but should not be given BCG during treatment or one year following discontinuation of Stelara. There is slight increased risk for nasopharyngitis, injection site reactions, and vuvlovaginal candidiasis, UTI, sinusitis, bronchitis. Listeria meningitis and ophthalmic herpes were reported in 1 patient each. Approximately 3% of patients develop antibodies to Stelara. There is limited information to guide use of Stelara during pregnancy. No adverse developmental effects were observed in monkeys exposed to doses 100 time greater than use. The patient was instructed to call immediately for signs or symptoms of infection, such as fever, sweats, chills, muscle aches, cough, shortness of breath, blood in phlegm, weight loss, warm, red or painful skin or sores, burning with urination or frequent urination, severe fatigue. Patient was also advised to call if they know they have TB or have been in close contact with someone with TB. Patient was also instructed to call immediately for headaches, seizures, confusion or vision problems. Dosing is given as intravenous load of 260 mg for patients up to 55 kg, 390 mg for patients >55 kg to 85 kg, and 520 mg for patients greater than 85 kg.     We will proceed with the following plan:    # Indeterminate colitis, on vedolizumab + AZA (hx of Humira, Remicade)  # History of DVT  # Recurrent Cdiff infection  PLAN  ---Obtain thiopurine metabolite levels  ---Obtain PA for Stelara   ----------Referral to [facility] pharmacy to discuss  this medication   ---Restart Vancomycin twice daily for suppression while colitis is being treated      # LLE DVT:   thought to be provoked (factors include prolonged car ride from [place] to [place] when moved here this summer, strain to gastrocnemius muscle on crutches for 6 weeks, on birth control and active IBD) and was previously on Eliquis.   She discontinued oral contraceptives. Will not favor tofacitinib has a therapy given DVT history.    # IBD health maintenance   --Greatly appreciate [facility] pharmacy    Vaccinations:  -- Influenza (every year): Last given 2019  -- TdaP (every 10 years): Last given 2014  -- Pneumococcal Pneumonia (once then every 5 years): Last given 2015  -- Yearly assessment for latent Tb (verbal screening and exam, PPD or QuantiFERON-Tb testing): Indeterminate due to anergy 2017     One time confirmation of immunity or serologies:  -- Hepatitis A (serologies or immunizations): Unknown  -- Hepatitis B (serologies or immunizations): Vaccinated  -- Varicella: had chicken pox as a child  -- MMR: [place]   -- HPV (all aged 18-26): Up to date  -- Meningococcal meningitis (all patients at risk for meningitis): Unknown  -- Due to the immunosuppression in this patient, I would not advise administration of live vaccines such as varicella/VZV, intranasal influenza, MMR, or yellow fever vaccine (if travelling).      Bone mineral density screening   -- DEXA WNL 2015    Cancer Screening:  Colon cancer screening: due 2020  Cervical cancer screening: Annual due to immunosupression    PERTINENT STUDIES:  CBC RESULTS:   Recent Labs   Lab Test 02/14/20  1505   WBC 7.0   RBC 3.76*   HGB 11.7   HCT 36.4   MCV 97   MCH 31.1   MCHC 32.1   RDW 12.0        CRP Inflammation   Date Value Ref Range Status   10/23/2020 <2.9 0.0 - 8.0 mg/L Final     RTC in 6 weeks     Video-Visit Details    Type of service:  Video Visit    Video Start Time: 5:00 PM  Video End Time: 5:30 PM    Originating Location (pt. Location):  Home    Distant Location (provider location):  [facility] [place]     Platform used for Video Visit: [employer]ella[employer]

## 2020-12-01 NOTE — NURSING NOTE
"Chief Complaint   Patient presents with     RECHECK     Follow up appt.       Vitals:    12/01/20 1427   Weight: 83 kg (183 lb)   Height: 1.676 m (5' 6\")       Body mass index is 29.54 kg/m .                            JASPAL NJ, EMT    "

## 2020-12-01 NOTE — PATIENT INSTRUCTIONS
PLAN  ---Obtain thiopurine metabolite levels  ---Obtain PA for Stelara   ----------Referral to [facility] pharmacy to discuss this medication   ---Vancomycin daily for suppression

## 2020-12-01 NOTE — LETTER
"  12/1/2020      RE: Natalie Villa  [address]  [address]  [facility] [address]      Dear Colleague,    Thank you for referring your patient, Natalie Villa, to the [facility] [place]. Please see a copy of my visit note below.    Natalie Villa is a 30 year old female who is being evaluated via a billable video visit.      The patient has been notified of following:     \"This video visit will be conducted via a call between you and your physician/provider. We have found that certain health care needs can be provided without the need for an in-person physical exam.  This service lets us provide the care you need with a video conversation.  If a prescription is necessary we can send it directly to your pharmacy.  If lab work is needed we can place an order for that and you can then stop by our lab to have the test done at a later time.    Video visits are billed at different rates depending on your insurance coverage.  Please reach out to your insurance provider with any questions.    If during the course of the call the physician/provider feels a video visit is not appropriate, you will not be charged for this service.\"    Patient has given verbal consent for Video visit? Yes  How would you like to obtain your AVS? MyChart  If you are dropped from the video visit, the video invite should be resent to: Text to cell phone: [phone]  Will anyone else be joining your video visit? No        GI CLINIC VISIT - ESTABLISHED PATIENT    CC/REFERRING PROVIDER: Referred Self  REASON FOR CONSULTATION: IBD follow up  Sea o  HPI: 28 year old female with history of Crohn's vs UC on Vedolizumab (failed adalimumab, and questionable antibodies to infliximab, now on vedolizumab since 6/19/2015) + AZA, initially diagnosed with Crohn's in 2013. Her symptoms at the time were bloody diarrhea, left sided abdominal pain and general fatigue. She was treated with Humira for 1 year but stopped after " colonoscopy showed ongoing disease. Then was treated with Remicade for about 18 months but per patient report, thinks she developed antibodies to it. She was started on Vedolizumab in June of 2015 initially at 300 mg Q8 weeks which was increased to Q6 weeks and mostly recently to Q4 weeks in May 2018 after colonoscopy in April 2018 showed ongoing colitis. She has been on AZA since diagnosis, initially at 200 mg Qday which has been titrated down to 100 mg Qday.     She had her first occurrence of C. difficile infection after the stool studies were completed after last visit in October 2018.  She completed a vancomycin taper.  Second occurrence (spontaneous without abx trigger) treated with vancomycin QID x 10 days then daily. Fecal esvin elevated to ~300, repeat cscope at that time showed Borrego 1 in descending, sigmoid and rectum.     She is currently having an average of 4 stools per day that are formed.  She has started running again, and occasionally noticed stools can be more loose.  She had one incidence of blood in her stool in January without any associated symptoms.  Due to a job change, she had to miss her January infusion with vedolizumab.  She noticed blood in her stool around this time after missing her dose.    Patient has had a significant amount of changes that have contributed to increased anxiety and some depression.  She changed jobs at the end of 2019, she moved in with her boyfriend, and some stresses with family dynamics.  With these changes she is noticed increased weight gain, increased alcohol consumption and increased fatigue despite 9 hours of sleep per night.  With the change in jobs, she now works from home, and she finds it difficult to maintain a schedule and remain active.  She also notes she was a week late in her menstrual cycle around the time of when her infusion was due.    Some increased pruritus in scalp that improved with restart of Selsun Blue shampoo.  She notes a questionable  "new allergy to shellfish with some itchiness in her throat after eating sushi recently.  No joint manifestations.    She also continues to follow with hematology as she was diagnosed with a LLE DVT fall 2018 that was thought to be provoked and completed therapy with Eliquis.  She will have repeat labs this spring with follow up and determine if IV iron infusions are required. Most recently, hemoglobin normal 2/14/20.    Interval history, 5/2020 (virtual visit)  Continues on VDZ q4w + AZA 75 mg BID. Last infusion 5/8/2020 (4/10/2020). Of note, CRP was 25.   Had met with Dr. Obrien. Tried the Whole 30 diet Mar-Apr. Stool was inconsistent at that time in the s/o increased fiber use (had loose stools up to 7 x per day). Stool normalized after liberalizing diet.     Using \"GI monitor\" brandon. Having ~4 BMs per day. Minimal urgency, mixed consistency stool, no blood. No abd pain.     Reports feeling well compared to the past. Seems to be inconsistent with recent CRP elevation.     Interval history, 9/2020 (video visit)  Saw Dr. Dodson with plans for a fecal calprotectin check in the setting of suppressive vancomycin. FC was 307.   Feeling \"pretty good\".   No abd pain. Having 3-4 BMs per day (improved). No blood. No urgency. Has not been dieting and exercising as much as before.   No joint pain or rashes.   Has been sober since March; has found this challenging.   Last Entyvio infusion was 8/28    Interval history, 12/2020 (video visit)  FC was elevated and icscope findings below, with active disease.   Having 3-4 well formed BMs per day. No blood. No urgency or abd pain. Weight has been stable.     ROS: 10pt ROS performed and otherwise negative.    PERTINENT PAST MEDICAL HISTORY:  As noted above.    PREVIOUS ABDOMINAL/GYNECOLOGIC SURGERIES:  None     PREVIOUS ENDOSCOPY:  Colonoscopy 4/23/2018 at [facility]: Mild colitis starting at the transverse colon (Borrego 1), progressing to more moderate colitis in the rectosigmoid (Borrego " 2). Bx showed chronic colitis. TI and right colon were unremarkable endoscopically and bx showed mild chronic activity.     Flex sig 10/2/2018 shows Borrego 2/3, continuous, biopsies show moderate chronic active colitis.    Colonoscopy 5/2019 shows  Borrego 1 in descending, sigmoid and rectum. Bx show mild active chronic colitis with cryptitis and focal erosion.     icscope 11/2020 showed SES-CD 12  PATH  SPECIMEN(S):   A: Colon biopsies at 70 cm   B: Colon biopsy, white patch at 65 cm   C: Colon biopsies at 60 cm   D: Colon biopsies at 50 cm   E: Colon biopsies at 40 cm   F: Colon biopsies at 30 cm   G: Colon biopsies at 20 cm   H: Colon biopsies at 10 cm     FINAL DIAGNOSIS:   A. Colon, Biopsies at 70 cm:   Colonic mucosa with no granulomas, cryptitis or other histologic evidence   of active Crohn; negative for   dysplasia     B. Colon, Biopsy of White Patch at 65 cm:   Colonic mucosa with mild crypt architecture distortion, evidence of prior   injury; no granulomas, cryptitis or   other histologic evidence of active Crohn; negative for dysplasia     C. Colon, Biopsies at 60 cm:   Colonic mucosa with mild fibrosis and crypt architecture distortion,   evidence of prior injury; no granulomas,   cryptitis or other histologic evidence of active Crohn; negative for   dysplasia     D. Colon, Biopsies at 50 cm:   Chronic active colitis with occasional crypt abscess, one small granuloma,    and crypt architectural distortion;   consistent with mildly active Crohn; negative for dysplasia; report of CMV    immunohistochemistry to follow     E. Colon, Biopsies at 40 cm:   Moderately active chronic colitis with crypt abscesses and crypt   distortion; negative for dysplasia; report of   CMV immunohistochemistry to follow     F. Colon, Biopsies at 30 cm:   Moderately active chronic colitis with crypt injuries and crypt   distortion; negative for dysplasia; report of   CMV immunohistochemistry to follow     G. Colon, Biopsies at 20  cm:   Moderately active chronic colitis with crypt injuries and crypt   distortion; negative for dysplasia; report of   CMV immunohistochemistry to follow     H. Colon, Biopsies at 10 cm:   Moderately active chronic colitis with crypt injuries and crypt   distortion; negative for dysplasia; report of   CMV immunohistochemistry to follow     PERTINENT MEDICATIONS:  - Azathioprine 75 mg BID  - Vedolizumab 300 mg Q4W (last dose 9/21/18)  - Maria Victoria OCP   Medications reviewed with patient today, see Medication List/Assessment for details.  No other NSAID/anticoagulation reported by patient.  No other OTC/herbal/supplements reported by patient.    SOCIAL HISTORY:  - Works as   - Uses alcohol 1-2 times/month   - Smokes marijuana 2-3 times/week  - No tobacco   - Sexually active, uses condoms      FAMILY HISTORY:  No colon/panc/esophageal/other GI CA, no other Valverde or other HPS-related Keo. No IBD/celiac, no other AI/liver/thyroid disease. + for asthma and acne.     PHYSICAL EXAMINATION:   General appearance  Healthy appearing adult, in no acute distress     Eyes  Sclera anicteric  Pupils round and reactive to light     Ears, nose, mouth and throat  No obvious external lesions of ears and nose  Hearing intact     Neck  Symmetric  No obvious external lesions     Respiratory  Normal respiration, no use of accessory muscles      MSK  Gait normal     Skin  No rashes or jaundice      Psychiatric  Oriented to person, place and time  Appropriate mood and affect.     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  Today we discussed various options, including Stelara, Cimzia, Simponi, ozanimod clinical trial, tofacitinib. We decided to move forward with Stelara.     We discussed the risks, benefits and alternatives to Stelara (ustekinumab) for Crohn's disease. Risks discussed include risk of infections and reactivation of latent infections.  Serious infections (bacterial, fungal and viral) were observed in clinical trials, but not at increased rates over placebo. There is a theoretical risk for mycobacterial infections and salmonella. Pre-treatment evaluation for TB will be done to minimize risk. Malignancies were reported in some patients in clinical trials, but not at increased frequency compared to placebo. Hypersensitivity reactions, including anaphylaxis, are reported but are rare. One case of reversible posterior leukoencephalopathy syndrome (RPLS) was observed in clinical studies of psoriasis and psoriatic arthritis, however no cases have been observed in Crohn's disease. Patients to be treated with Stelara should have all age-appropriate immunizations, but should not be given BCG during treatment or one year following discontinuation of Stelara. There is slight increased risk for nasopharyngitis, injection site reactions, and vuvlovaginal candidiasis, UTI, sinusitis, bronchitis. Listeria meningitis and ophthalmic herpes were reported in 1 patient each. Approximately 3% of patients develop antibodies to Stelara. There is limited information to guide use of Stelara during pregnancy. No adverse developmental effects were observed in monkeys exposed to doses 100 time greater than use. The patient was instructed to call immediately for signs or symptoms of infection, such as fever, sweats, chills, muscle aches, cough, shortness of breath, blood in phlegm, weight loss, warm, red or painful skin or sores, burning with urination or frequent urination, severe fatigue. Patient was also advised to call if they know they have TB or have been in close contact with someone with TB. Patient was also instructed to call immediately for headaches, seizures, confusion or vision problems. Dosing is given as intravenous load of 260 mg for patients up to 55 kg, 390 mg for patients >55 kg to 85 kg, and 520 mg for patients greater than 85 kg.     We will proceed with  the following plan:    # Indeterminate colitis, on vedolizumab + AZA (hx of Humira, Remicade)  # History of DVT  # Recurrent Cdiff infection  PLAN  ---Obtain thiopurine metabolite levels  ---Obtain PA diya Fitzgerald   ----------Referral to [facility] to discuss this medication   ---Restart Vancomycin twice daily for suppression while colitis is being treated      # LLE DVT:   thought to be provoked (factors include prolonged car ride from [place] to [place] when moved here this summer, strain to gastrocnemius muscle on crutches for 6 weeks, on birth control and active IBD) and was previously on Eliquis.   She discontinued oral contraceptives. Will not favor tofacitinib has a therapy given DVT history.    # IBD health maintenance   --Greatly appreciate [facility]    Vaccinations:  -- Influenza (every year): Last given 2019  -- TdaP (every 10 years): Last given 2014  -- Pneumococcal Pneumonia (once then every 5 years): Last given 2015  -- Yearly assessment for latent Tb (verbal screening and exam, PPD or QuantiFERON-Tb testing): Indeterminate due to anergy 2017     One time confirmation of immunity or serologies:  -- Hepatitis A (serologies or immunizations): Unknown  -- Hepatitis B (serologies or immunizations): Vaccinated  -- Varicella: had chicken pox as a child  -- MMR: [place]   -- HPV (all aged 18-26): Up to date  -- Meningococcal meningitis (all patients at risk for meningitis): Unknown  -- Due to the immunosuppression in this patient, I would not advise administration of live vaccines such as varicella/VZV, intranasal influenza, MMR, or yellow fever vaccine (if travelling).      Bone mineral density screening   -- DEXA WNL 2015    Cancer Screening:  Colon cancer screening: due 2020  Cervical cancer screening: Annual due to immunosupression    PERTINENT STUDIES:  CBC RESULTS:   Recent Labs   Lab Test 02/14/20  1505   WBC 7.0   RBC 3.76*   HGB 11.7   HCT 36.4   MCV 97   MCH 31.1   MCHC 32.1   RDW 12.0         CRP Inflammation   Date Value Ref Range Status   10/23/2020 <2.9 0.0 - 8.0 mg/L Final     RTC in 6 weeks     Video-Visit Details    Type of service:  Video Visit    Video Start Time: 5:00 PM  Video End Time: 5:30 PM    Originating Location (pt. Location): Home    Distant Location (provider location):  [facility] GASTROENTEROLOG[facility]     Platform used for Video Visit: Robbi Berger MD

## 2020-12-03 ENCOUNTER — VIRTUAL VISIT (OUTPATIENT)
Dept: PHARMACY | Facility: CLINIC | Age: 30
End: 2020-12-03
Attending: INTERNAL MEDICINE
Payer: COMMERCIAL

## 2020-12-03 DIAGNOSIS — J45.990 EXERCISE-INDUCED ASTHMA: ICD-10-CM

## 2020-12-03 DIAGNOSIS — K50.10 CROHN'S DISEASE OF LARGE INTESTINE WITHOUT COMPLICATION (H): Primary | ICD-10-CM

## 2020-12-03 DIAGNOSIS — J30.2 SEASONAL ALLERGIC RHINITIS, UNSPECIFIED TRIGGER: ICD-10-CM

## 2020-12-03 PROCEDURE — 99606 MTMS BY PHARM EST 15 MIN: CPT | Mod: TEL | Performed by: PHARMACIST

## 2020-12-03 PROCEDURE — 99607 MTMS BY PHARM ADDL 15 MIN: CPT | Mod: TEL | Performed by: PHARMACIST

## 2020-12-03 NOTE — PROGRESS NOTES
MTM ENCOUNTER  SUBJECTIVE/OBJECTIVE:                           Natalie Villa is a 30 year old female called for a follow-up visit. She was referred to me from Dr. Berger.  Today's visit is a follow-up MTM visit from 5/22/20.      Reason for visit: Discuss Stelara    Allergies/ADRs: Reviewed in chart  Tobacco: She reports that she has never smoked. She has never used smokeless tobacco.  Alcohol: not currently using - reports being sober for 8 months    Medication Adherence/Access: no issues reported    Crohn's:   Azathioprine 75 mg twice daily   Calcium carbonate daily  Vancomycin 125 mg daily   Vitamin D 2,000 units daily    No fevers, no aches. Was honestly surprised about the fecal esvin/colonoscopy results. She has been on Entyvio, but based on labs the plan is to switch to ustekinumab if this is covered by her insurance. Last visit with Dr. Berger 12/1/20. Dr. Berger ordered thiopurine metabolites and pre-biologic screening. Reviewed basics of thiopurine metabolite monitoring today at patient request.    Reports she restarted vancomycin daily. Only has about 12 days for twice daily dosing, so she was doing once daily. Reviewed notes from Dr. Berger, and the body of the text indicates to take vancomycin twice daily, however the patient takeaway notes daily vancomycin. Notes using acetaminophen when needed for pain.    Reviewed Stelara today including coverage, general dosing, mechanism of action, side effects (both common/serious), monitoring, precautions and time to efficacy. Reviewed ability to do therapeutic drug monitoring if indicated. Discussed general administration of Stelara loading dose versus self-injections. Would recommend formal injection training for her first self-injection. Discussed availability of copay assistance for both infusion/injections if needed. Reviewed recommendation to avoid LIVE vaccines and potential need to hold Stelara for signs/symptoms of infection. Summer notes she is hoping  to establish with primary care soon.     IBD Health Care Maintenance:    Vaccinations:  All patients on biologics should avoid live vaccines.    -- Influenza (every year) due for 20-21 season  -- TdaP (every 10 years) last 8/14/2012, due 8/2022  -- Pneumococcal Pneumonia (once plus booster at 5 years)    - Prevnar-13 2015   - Pneumovax-23 2013  -- Yearly assessment for latent Tb (verbal screening and exam, PPD or QuantiFERON-Tb testing)   -lab previously pended    One time confirmation of immunity or serologies:  -- Hepatitis A (serologies or immunizations) not on file  -- Hepatitis B (serologies or immunizations) vaccinated 2002, serologies previously pended  -- Varicella had chicken pox as a child, consider Shingrix  -- MMR second dose 1/2/2002  -- HPV (all aged 18-26) completed 2007    Due to the immunosuppression in this patient, I would not advise administration of live vaccines such as varicella/VZV, intranasal influenza, MMR, or yellow fever vaccine (if traveling).      Bone mineral density screening   -- Recommend all patients supplement with calcium and vitamin D   -- DEXA WNL 2015   -- taking supplemental calcium/D    Cancer Screening:  Cervical cancer screening: Annual due to immunosupression  -- notes this was pushed to June 2021    Skin cancer screening: Annual visual exam of skin by dermatologist since patient is immunocompromised  -- due for this    Asthma:   Albuterol HFA as needed     Not used recently. No reported concerns.    Allergies:   Fexofenadine 180 mg daily - for the fall    Took from September-October. Not needing at this time.    ASSESSMENT:                            Medication Adherence: See below for considerations    Crohn's: Summer would likely benefit from switching to Stelara as indicated by Dr. Berger pending insurance coverage. Reviewed how to sign-up for [facility] in the event she needs this for the infusion and/or self-injection. As above, would recommend formal  injection training for first Stelara self-injection. Reviewed health maintenance today in light of pending IL-antagonist therapy. Would recommend the seasonal influenza vaccine (non-live). She is also due for a booster of Pneumovax-23 given it has been over five years since her last dose. She could also consider Shingrix, discussed off-label nature of this recommendation which is supported by Dr. Berger. She could also consider vaccination with the Hepatitis A series based on personal risk factors. Will clarify once versus twice daily vancomycin given she will need a new order for this if she is to continue for the time being. Stelara is not yet authorized at the time of this discussion, but will plan to connect with Sangeeta MCCOY.    Asthma: Stable based on report.    Allergies: Stable based on report.    PLAN:                            1. Vania to clarify vancomycin once or twice daily.   -- Discussed with Sangeeta Singh RNCC who is discussing this with Dr. Berger directly    2. Vania to connect with Sangeeta MCCOY re: infusion    -- Update: therapy plan entered    3. Summer to establish with primary care, sign up for [facility], and get labs as ordered      4. Consider getting the following vaccines:  -flu shot  -Shingrix  -Pneumovax-23  -Hep A series    I spent 60 minutes with this patient today. I offer these suggestions for consideration by Dr. Berger. A copy of the visit note was provided to the patient's referring provider.    Will follow up one month after medication start, HM review in 1 year.    The patient was sent via [employer] a summary of these recommendations.     Vania Monteiro PharmD, BCACP  MTM Pharmacist   [facility] Gastroenterology and Rheumatology  Phone: [phone]    Patient consented to a telehealth visit: yes  Telemedicine Visit Details  Type of service:  Telephone visit  Start Time: 10:01 AM  End Time: 11:01 AM  Originating Location (patient location): Home  Distant Location  (provider location):  [facility] MTM  Mode of Communication:  Telephone

## 2020-12-06 ENCOUNTER — HEALTH MAINTENANCE LETTER (OUTPATIENT)
Age: 30
End: 2020-12-06

## 2020-12-07 ENCOUNTER — PATIENT OUTREACH (OUTPATIENT)
Dept: GASTROENTEROLOGY | Facility: CLINIC | Age: 30
End: 2020-12-07

## 2020-12-07 NOTE — PROGRESS NOTES
Orders placed for the patient to start on stelara.  Will request a PA through the patient insurance.

## 2020-12-09 NOTE — PATIENT INSTRUCTIONS
Recommendations from today's MTM visit:                                                      1. I will reach out to your care team regarding vancomycin once versus twice daily as you will likely need a new order to continue this.    2. I will connect with Sangeeta regarding the status of your Stelara authorization. Consider signing up for the [facility] - which has financial resources if needed for both the infusion and self-injections.    3. Establish with primary care as planned.    4. Consider the following vaccines:   -- seasonal influenza vaccine (not the nasal spray)   -- Shingrix (non-live Shingles vaccine)   -- Pneumovax-23 (pneumonia vaccine booster)   -- Hepatitis A series     It was great to speak with you today.  I value your experience and would be very thankful for your time with providing feedback on our clinic survey. You may receive a survey via email or text message in the next few days.     Next MTM visit: 1 month after medication start for check-in    To schedule another MTM appointment, please call the clinic directly or you may call the MTM scheduling line at [phone] or toll-free at [phone].     My Clinical Pharmacist's contact information:                                                      It was a pleasure talking with you today!  Please feel free to contact me with any questions or concerns you have.    Vania Monteiro PharmD, BCACP  MTM Pharmacist   [facility] Gastroenterology and Rheumatology  Phone: [phone]

## 2020-12-18 ENCOUNTER — TELEPHONE (OUTPATIENT)
Dept: GASTROENTEROLOGY | Facility: CLINIC | Age: 30
End: 2020-12-18
Payer: COMMERCIAL

## 2020-12-18 ENCOUNTER — INFUSION THERAPY VISIT (OUTPATIENT)
Dept: INFUSION THERAPY | Facility: CLINIC | Age: 30
End: 2020-12-18
Attending: INTERNAL MEDICINE
Payer: COMMERCIAL

## 2020-12-18 ENCOUNTER — HOSPITAL ENCOUNTER (OUTPATIENT)
Facility: CLINIC | Age: 30
Setting detail: SPECIMEN
Discharge: HOME | End: 2020-12-18
Attending: INTERNAL MEDICINE | Admitting: INTERNAL MEDICINE
Payer: COMMERCIAL

## 2020-12-18 VITALS
RESPIRATION RATE: 18 BRPM | DIASTOLIC BLOOD PRESSURE: 71 MMHG | HEART RATE: 66 BPM | TEMPERATURE: 97.1 F | SYSTOLIC BLOOD PRESSURE: 108 MMHG | OXYGEN SATURATION: 100 %

## 2020-12-18 DIAGNOSIS — K50.10 CROHN'S DISEASE OF LARGE INTESTINE WITHOUT COMPLICATION (H): Primary | ICD-10-CM

## 2020-12-18 LAB
ALBUMIN SERPL-MCNC: 3.7 G/DL (ref 3.4–5)
ALP SERPL-CCNC: 59 U/L (ref 40–150)
ALT SERPL W P-5'-P-CCNC: 7 U/L (ref 0–50)
AST SERPL W P-5'-P-CCNC: 12 U/L (ref 0–45)
BASOPHILS # BLD AUTO: 0 10E9/L (ref 0–0.2)
BASOPHILS NFR BLD AUTO: 1 %
BILIRUB DIRECT SERPL-MCNC: 0.2 MG/DL (ref 0–0.2)
BILIRUB SERPL-MCNC: 0.9 MG/DL (ref 0.2–1.3)
CRP SERPL-MCNC: <2.9 MG/L (ref 0–8)
DIFFERENTIAL METHOD BLD: ABNORMAL
EOSINOPHIL # BLD AUTO: 0.3 10E9/L (ref 0–0.7)
EOSINOPHIL NFR BLD AUTO: 7.2 %
ERYTHROCYTE [DISTWIDTH] IN BLOOD BY AUTOMATED COUNT: 12.1 % (ref 10–15)
ERYTHROCYTE [SEDIMENTATION RATE] IN BLOOD BY WESTERGREN METHOD: 7 MM/H (ref 0–20)
HCT VFR BLD AUTO: 39.2 % (ref 35–47)
HGB BLD-MCNC: 12.6 G/DL (ref 11.7–15.7)
IMM GRANULOCYTES # BLD: 0 10E9/L (ref 0–0.4)
IMM GRANULOCYTES NFR BLD: 0.5 %
LYMPHOCYTES # BLD AUTO: 1 10E9/L (ref 0.8–5.3)
LYMPHOCYTES NFR BLD AUTO: 26.6 %
MCH RBC QN AUTO: 31.5 PG (ref 26.5–33)
MCHC RBC AUTO-ENTMCNC: 32.1 G/DL (ref 31.5–36.5)
MCV RBC AUTO: 98 FL (ref 78–100)
MONOCYTES # BLD AUTO: 0.3 10E9/L (ref 0–1.3)
MONOCYTES NFR BLD AUTO: 6.4 %
NEUTROPHILS # BLD AUTO: 2.3 10E9/L (ref 1.6–8.3)
NEUTROPHILS NFR BLD AUTO: 58.3 %
NRBC # BLD AUTO: 0 10*3/UL
NRBC BLD AUTO-RTO: 0 /100
PLATELET # BLD AUTO: 255 10E9/L (ref 150–450)
PROT SERPL-MCNC: 8 G/DL (ref 6.8–8.8)
RBC # BLD AUTO: 4 10E12/L (ref 3.8–5.2)
WBC # BLD AUTO: 3.9 10E9/L (ref 4–11)

## 2020-12-18 PROCEDURE — 85025 COMPLETE CBC W/AUTO DIFF WBC: CPT | Performed by: INTERNAL MEDICINE

## 2020-12-18 PROCEDURE — 86140 C-REACTIVE PROTEIN: CPT | Performed by: INTERNAL MEDICINE

## 2020-12-18 PROCEDURE — 250N000011 HC RX IP 250 OP 636: Performed by: PHYSICIAN ASSISTANT

## 2020-12-18 PROCEDURE — 86706 HEP B SURFACE ANTIBODY: CPT | Performed by: INTERNAL MEDICINE

## 2020-12-18 PROCEDURE — 36415 COLL VENOUS BLD VENIPUNCTURE: CPT

## 2020-12-18 PROCEDURE — 258N000003 HC RX IP 258 OP 636: Performed by: PHYSICIAN ASSISTANT

## 2020-12-18 PROCEDURE — 80076 HEPATIC FUNCTION PANEL: CPT | Performed by: INTERNAL MEDICINE

## 2020-12-18 PROCEDURE — 85652 RBC SED RATE AUTOMATED: CPT | Performed by: INTERNAL MEDICINE

## 2020-12-18 PROCEDURE — 87340 HEPATITIS B SURFACE AG IA: CPT | Performed by: INTERNAL MEDICINE

## 2020-12-18 PROCEDURE — 80299 QUANTITATIVE ASSAY DRUG: CPT | Performed by: INTERNAL MEDICINE

## 2020-12-18 PROCEDURE — 86481 TB AG RESPONSE T-CELL SUSP: CPT | Performed by: INTERNAL MEDICINE

## 2020-12-18 PROCEDURE — 86704 HEP B CORE ANTIBODY TOTAL: CPT | Performed by: INTERNAL MEDICINE

## 2020-12-18 PROCEDURE — 96365 THER/PROPH/DIAG IV INF INIT: CPT

## 2020-12-18 RX ADMIN — VEDOLIZUMAB 300 MG: 300 INJECTION, POWDER, LYOPHILIZED, FOR SOLUTION INTRAVENOUS at 08:38

## 2020-12-18 NOTE — PROGRESS NOTES
Infusion Nursing Note:  Natalie Villa presents today for Entyvio.    Patient seen by provider today: No   present during visit today: Not Applicable.    Note: Patient reports this is her last dose of Entyvio and she will be starting Stelara soon.      Intravenous Access:  Labs drawn without difficulty.  Peripheral IV placed.    Treatment Conditions:  Biological Infusion Checklist:  ~~~ NOTE: If the patient answers yes to any of the questions below, hold the infusion and contact ordering provider or on-call provider.    1. Have you recently had an elevated temperature, fever, chills, productive cough, coughing for 3 weeks or longer or hemoptysis, abnormal vital signs, night sweats,  chest pain or have you noticed a decrease in your appetite, unexplained weight loss or fatigue? No  2. Do you have any open wounds or new incisions? No  3. Do you have any recent or upcoming hospitalizations, surgeries or dental procedures? No  4. Do you currently have or recently have had any signs of illness or infection or are you on any antibiotics? Yes, prophylatcic vancomycin until starting stelara  5. Have you had any new, sudden or worsening abdominal pain? No  6. Have you or anyone in your household received a live vaccination in the past 4 weeks? Please note:  No live vaccines while on biologic/chemotherapy until 6 months after the last treatment.  Patient can receive the flu vaccine (shot only) and the pneumovax.  It is optimal for the patient to get these vaccines mid cycle, but they can be given at any time as long as it is not on the day of the infusion. No  7. Have you recently been diagnosed with any new nervous system diseases (ie. Multiple sclerosis, Guillain [place], seizures, neurological changes) or cancer diagnosis? No  8. Are you on any form of radiation or chemotherapy? No  9. Are you pregnant or breast feeding or do you have plans of pregnancy in the future? No  10. Have you been having any signs of  worsening depression or suicidal ideations?  (benlysta only) No  11. Have there been any other new onset medical symptoms? No        Post Infusion Assessment:  Patient tolerated infusion without incident.  Site patent and intact, free from redness, edema or discomfort.  No evidence of extravasations.  Access discontinued per protocol.       Discharge Plan:   AVS to patient via MYCHART.   Patient discharged in stable condition accompanied by: self.  Departure Mode: Ambulatory.    Kary Rivera RN

## 2020-12-18 NOTE — TELEPHONE ENCOUNTER
Attempted to call patient in relation to the questions she had related to her Stelara infusions and injections.  Left my direct number for contact

## 2020-12-20 LAB
GAMMA INTERFERON BACKGROUND BLD IA-ACNC: 0.03 IU/ML
HBV CORE AB SERPL QL IA: NONREACTIVE
HBV SURFACE AB SERPL IA-ACNC: 26.98 M[IU]/ML
HBV SURFACE AG SERPL QL IA: NONREACTIVE
M TB IFN-G CD4+ BCKGRND COR BLD-ACNC: 5.9 IU/ML
M TB TUBERC IFN-G BLD QL: NEGATIVE
MITOGEN IGNF BCKGRD COR BLD-ACNC: 0.01 IU/ML
MITOGEN IGNF BCKGRD COR BLD-ACNC: 0.02 IU/ML

## 2020-12-23 LAB
6-TGN ENTSUB RBC: 313 PMOL/8X10(8)RBC (ref 235–450)
6MMP ENTSUB RBC: 661 PMOL/8X10(8)RBC

## 2021-01-05 ENCOUNTER — OFFICE VISIT (OUTPATIENT)
Dept: FAMILY MEDICINE | Facility: CLINIC | Age: 31
End: 2021-01-05
Payer: COMMERCIAL

## 2021-01-05 VITALS
TEMPERATURE: 97.8 F | HEART RATE: 77 BPM | HEIGHT: 66 IN | BODY MASS INDEX: 32.78 KG/M2 | WEIGHT: 204 LBS | SYSTOLIC BLOOD PRESSURE: 114 MMHG | RESPIRATION RATE: 16 BRPM | OXYGEN SATURATION: 98 % | DIASTOLIC BLOOD PRESSURE: 78 MMHG

## 2021-01-05 DIAGNOSIS — Z23 NEED FOR IMMUNIZATION AGAINST INFLUENZA: ICD-10-CM

## 2021-01-05 DIAGNOSIS — Z01.411 ENCOUNTER FOR GYNECOLOGICAL EXAMINATION WITH ABNORMAL FINDING: Primary | ICD-10-CM

## 2021-01-05 PROCEDURE — 99395 PREV VISIT EST AGE 18-39: CPT | Mod: 25 | Performed by: PHYSICIAN ASSISTANT

## 2021-01-05 PROCEDURE — 90686 IIV4 VACC NO PRSV 0.5 ML IM: CPT | Performed by: PHYSICIAN ASSISTANT

## 2021-01-05 PROCEDURE — G0145 SCR C/V CYTO,THINLAYER,RESCR: HCPCS | Performed by: PHYSICIAN ASSISTANT

## 2021-01-05 PROCEDURE — 87624 HPV HI-RISK TYP POOLED RSLT: CPT | Performed by: PHYSICIAN ASSISTANT

## 2021-01-05 PROCEDURE — 90471 IMMUNIZATION ADMIN: CPT | Performed by: PHYSICIAN ASSISTANT

## 2021-01-05 ASSESSMENT — MIFFLIN-ST. JEOR: SCORE: 1662.09

## 2021-01-05 NOTE — PROGRESS NOTES
SUBJECTIVE:   CC: Natalie Villa is an 30 year old woman who presents for preventive health visit.       Patient has been advised of split billing requirements and indicates understanding: Yes  Healthy Habits:  Answers for HPI/ROS submitted by the patient on 1/2/2021   Annual Exam:  Frequency of exercise:: 1 day/week  Getting at least 3 servings of Calcium per day:: Yes  Diet:: Regular (no restrictions), Other  Taking medications regularly:: Yes  Medication side effects:: None  Bi-annual eye exam:: Yes  Dental care twice a year:: Yes  Sleep apnea or symptoms of sleep apnea:: None  Additional concerns today:: Yes  Duration of exercise:: 15-30 minutes  Do you have sleep apnea, excessive snoring or daytime drowsiness?no    Pap smear if possble.  Mental health stable with no concerns.  Labs through Gastroenterology with questions about shingles and pneumo vaccines      PROBLEMS TO ADD ON...    Today's PHQ-2 Score:   PHQ-2 ( 1999 Pfizer) 1/2/2021 2/24/2020   Q1: Little interest or pleasure in doing things 1 0   Q2: Feeling down, depressed or hopeless 0 1   PHQ-2 Score 1 1   Q1: Little interest or pleasure in doing things Several days Not at all   Q2: Feeling down, depressed or hopeless Not at all Several days   PHQ-2 Score 1 1       Abuse: Current or Past(Physical, Sexual or Emotional)- No  Do you feel safe in your environment? Yes        Social History     Tobacco Use     Smoking status: Never Smoker     Smokeless tobacco: Never Used     Tobacco comment: Pt does not smoke but lives with a smoker (mom)    Substance Use Topics     Alcohol use: Yes     Comment: socially     If you drink alcohol do you typically have >3 drinks per day or >7 drinks per week? No                     Reviewed orders with patient.  Reviewed health maintenance and updated orders accordingly - Yes  Labs reviewed in EPIC    Mammogram not appropriate for this patient based on age.    Pertinent mammograms are reviewed under the imaging  "tab.  History of abnormal Pap smear: NO - age 30- 65 PAP every 3 years recommended     Reviewed and updated as needed this visit by clinical staff  Tobacco  Allergies  Meds  Problems  Med Hx  Surg Hx  Fam Hx          Reviewed and updated as needed this visit by Provider  Tobacco  Allergies  Meds  Problems  Med Hx  Surg Hx  Fam Hx             ROS:  CONSTITUTIONAL: NEGATIVE for fever, chills, change in weight  INTEGUMENTARU/SKIN: NEGATIVE for worrisome rashes, moles or lesions  EYES: NEGATIVE for vision changes or irritation  ENT: NEGATIVE for ear, mouth and throat problems  RESP: NEGATIVE for significant cough or SOB  BREAST: NEGATIVE for masses, tenderness or discharge  CV: NEGATIVE for chest pain, palpitations or peripheral edema  GI: NEGATIVE for nausea, abdominal pain, heartburn, or change in bowel habits  : NEGATIVE for unusual urinary or vaginal symptoms. Periods are regular.  MUSCULOSKELETAL: NEGATIVE for significant arthralgias or myalgia  NEURO: NEGATIVE for weakness, dizziness or paresthesias  PSYCHIATRIC: NEGATIVE for changes in mood or affect    OBJECTIVE:   /78 (BP Location: Right arm, Cuff Size: Adult Large)   Pulse 77   Temp 97.8  F (36.6  C) (Tympanic)   Resp 16   Ht 1.676 m (5' 6\")   Wt 92.5 kg (204 lb)   LMP 12/16/2020   SpO2 98%   BMI 32.93 kg/m    EXAM:  GENERAL: healthy, alert and no distress  EYES: Eyes grossly normal to inspection, PERRL and conjunctivae and sclerae normal  HENT: ear canals and TM's normal, nose and mouth without ulcers or lesions  NECK: no adenopathy, no asymmetry, masses, or scars and thyroid normal to palpation  RESP: lungs clear to auscultation - no rales, rhonchi or wheezes  BREAST: normal without masses, tenderness or nipple discharge and no palpable axillary masses or adenopathy  CV: regular rate and rhythm, normal S1 S2, no S3 or S4, no murmur, click or rub, no peripheral edema and peripheral pulses strong  ABDOMEN: soft, nontender, no " "hepatosplenomegaly, no masses and bowel sounds normal   (female): normal female external genitalia, normal urethral meatus, vaginal mucosa pink, moist, well rugated, and normal cervix/adnexa/uterus without masses or discharge; pap smear done today  MS: no gross musculoskeletal defects noted, no edema  SKIN: no suspicious lesions or rashes  NEURO: Normal strength and tone, mentation intact and speech normal  PSYCH: mentation appears normal, affect normal/bright    Diagnostic Test Results:  Labs reviewed in Epic    ASSESSMENT/PLAN:       ICD-10-CM    1. Encounter for gynecological examination with abnormal finding  Z01.411 Pap imaged thin layer screen with HPV - recommended age 30 - 65 years (select HPV order below)     HPV High Risk Types DNA Cervical   2. Need for immunization against influenza  Z23 INFLUENZA VACCINE IM > 6 MONTHS VALENT IIV4 [08545]       Patient has been advised of split billing requirements and indicates understanding: Yes  COUNSELING:   Reviewed preventive health counseling, as reflected in patient instructions       Regular exercise       Healthy diet/nutrition    Estimated body mass index is 32.93 kg/m  as calculated from the following:    Height as of this encounter: 1.676 m (5' 6\").    Weight as of this encounter: 92.5 kg (204 lb).    Weight management plan: Discussed healthy diet and exercise guidelines    She reports that she has never smoked. She has never used smokeless tobacco.      Counseling Resources:  ATP IV Guidelines  Pooled Cohorts Equation Calculator  Breast Cancer Risk Calculator  BRCA-Related Cancer Risk Assessment: FHS-7 Tool  FRAX Risk Assessment  ICSI Preventive Guidelines  Dietary Guidelines for Americans, 2010  USDA's MyPlate  ASA Prophylaxis  Lung CA Screening    Xena Arredondo PA-C  [facility]  "

## 2021-01-05 NOTE — NURSING NOTE
"Chief Complaint   Patient presents with     Physical     initial /78 (BP Location: Right arm, Cuff Size: Adult Large)   Pulse 77   Temp 97.8  F (36.6  C) (Tympanic)   Resp 16   Ht 1.676 m (5' 6\")   Wt 92.5 kg (204 lb)   LMP 12/16/2020   SpO2 98%   BMI 32.93 kg/m   Estimated body mass index is 32.93 kg/m  as calculated from the following:    Height as of this encounter: 1.676 m (5' 6\").    Weight as of this encounter: 92.5 kg (204 lb).  BP completed using cuff size: large.  R arm      Health Maintenance that is potentially due pending provider review:  NONE    n/a    Guan[place]ito Emerson ma  "

## 2021-01-08 LAB
COPATH REPORT: NORMAL
PAP: NORMAL

## 2021-01-08 NOTE — PROGRESS NOTES
Patent called in with questions today.  patient had questions regarding her shingrix and pneumonia vaccine.  patient was informed that she should meet criteria for insurance to pay for the pneumonia vaccine but she will not have insurance coverage for the shingrix vaccine.  Patient was informed that she can request the estimated cost of the shingirx vaccine and the to decide when she will have it given to her.

## 2021-01-11 LAB
FINAL DIAGNOSIS: NORMAL
HPV HR 12 DNA CVX QL NAA+PROBE: NEGATIVE
HPV16 DNA SPEC QL NAA+PROBE: NEGATIVE
HPV18 DNA SPEC QL NAA+PROBE: NEGATIVE
SPECIMEN DESCRIPTION: NORMAL
SPECIMEN SOURCE CVX/VAG CYTO: NORMAL

## 2021-01-12 PROBLEM — Z12.4 CERVICAL CANCER SCREENING: Status: ACTIVE | Noted: 2021-01-12

## 2021-01-12 PROBLEM — R87.810 ASCUS WITH POSITIVE HIGH RISK HPV CERVICAL: Status: ACTIVE | Noted: 2019-12-19

## 2021-01-12 PROBLEM — R87.610 ASCUS WITH POSITIVE HIGH RISK HPV CERVICAL: Status: ACTIVE | Noted: 2019-12-19

## 2021-01-13 ENCOUNTER — TELEPHONE (OUTPATIENT)
Dept: FAMILY MEDICINE | Facility: CLINIC | Age: 31
End: 2021-01-13

## 2021-01-13 NOTE — TELEPHONE ENCOUNTER
Patient Quality Outreach      Summary:    Patient has the following on her problem list/HM:   Asthma review       ACT Total Scores 12/19/2019   ACT TOTAL SCORE (Goal Greater than or Equal to 20) 23   In the past 12 months, how many times did you visit the emergency room for your asthma without being admitted to the hospital? 0   In the past 12 months, how many times were you hospitalized overnight because of your asthma? 0          Patient is due/failing the following:   ACT needed    Type of outreach:    Sent [employer] message.    Questions for provider review:    None                                                                                         Princess MONTALVO RN

## 2021-01-15 ENCOUNTER — INFUSION THERAPY VISIT (OUTPATIENT)
Dept: INFUSION THERAPY | Facility: CLINIC | Age: 31
End: 2021-01-15
Attending: INTERNAL MEDICINE
Payer: COMMERCIAL

## 2021-01-15 VITALS
WEIGHT: 193.4 LBS | OXYGEN SATURATION: 97 % | BODY MASS INDEX: 31.22 KG/M2 | RESPIRATION RATE: 16 BRPM | TEMPERATURE: 98.2 F | SYSTOLIC BLOOD PRESSURE: 109 MMHG | DIASTOLIC BLOOD PRESSURE: 62 MMHG | HEART RATE: 75 BPM

## 2021-01-15 DIAGNOSIS — K50.10 CROHN'S DISEASE OF LARGE INTESTINE WITHOUT COMPLICATION (H): Primary | ICD-10-CM

## 2021-01-15 PROCEDURE — 96365 THER/PROPH/DIAG IV INF INIT: CPT

## 2021-01-15 PROCEDURE — 250N000011 HC RX IP 250 OP 636: Performed by: INTERNAL MEDICINE

## 2021-01-15 PROCEDURE — 258N000003 HC RX IP 258 OP 636: Performed by: INTERNAL MEDICINE

## 2021-01-15 RX ADMIN — USTEKINUMAB 390 MG: 130 SOLUTION INTRAVENOUS at 08:46

## 2021-01-15 ASSESSMENT — PAIN SCALES - GENERAL: PAINLEVEL: NO PAIN (0)

## 2021-01-15 NOTE — PROGRESS NOTES
"Infusion Nursing Note:  Natalie Villa presents today for first dose Stelara.    Patient seen by provider today: No   present during visit today: Not Applicable.    Note: patient reports she is feeling great, formed stools 3X day, no abdominal pain, less aches due to not being in cold weather often. Finishing vancomycin today from cdiff infection in past. However, states even though she feels good, her colonoscopy \"didn't look good.\" Therefore, the change in regimen. Patient will get first IV dose today, then subcutaneous every 8 weeks at home.     Intravenous Access:  Peripheral IV placed.    Treatment Conditions:  Biological Infusion Checklist:  ~~~ NOTE: If the patient answers yes to any of the questions below, hold the infusion and contact ordering provider or on-call provider.    1. Have you recently had an elevated temperature, fever, chills, productive cough, coughing for 3 weeks or longer or hemoptysis, abnormal vital signs, night sweats,  chest pain or have you noticed a decrease in your appetite, unexplained weight loss or fatigue? No  2. Do you have any open wounds or new incisions? No  3. Do you have any recent or upcoming hospitalizations, surgeries or dental procedures? No  4. Do you currently have or recently have had any signs of illness or infection or are you on any antibiotics? Yes, Vancomycin finishing today due to past history of cdiff  5. Have you had any new, sudden or worsening abdominal pain? No  6. Have you or anyone in your household received a live vaccination in the past 4 weeks? Please note:  No live vaccines while on biologic/chemotherapy until 6 months after the last treatment.  Patient can receive the flu vaccine (shot only) and the pneumovax.  It is optimal for the patient to get these vaccines mid cycle, but they can be given at any time as long as it is not on the day of the infusion. No  7. Have you recently been diagnosed with any new nervous system diseases (ie. " Multiple sclerosis, Guillain [place], seizures, neurological changes) or cancer diagnosis? No  8. Are you on any form of radiation or chemotherapy? No  9. Are you pregnant or breast feeding or do you have plans of pregnancy in the future? No  10. Have you been having any signs of worsening depression or suicidal ideations?  (benlysta only) No  11. Have there been any other new onset medical symptoms? No        Post Infusion Assessment:  Patient tolerated infusion without incident.  Blood return noted pre and post infusion.  Site patent and intact, free from redness, edema or discomfort.  No evidence of extravasations.  Access discontinued per protocol.       Discharge Plan:   Patient no future appt due to receiving future injections at home.   Patient discharged in stable condition accompanied by: self.  Departure Mode: Ambulatory.    Laverne Og RN

## 2021-02-01 ENCOUNTER — ALLIED HEALTH/NURSE VISIT (OUTPATIENT)
Dept: NURSING | Facility: CLINIC | Age: 31
End: 2021-02-01
Payer: COMMERCIAL

## 2021-02-01 ENCOUNTER — MYC MEDICAL ADVICE (OUTPATIENT)
Dept: PHARMACY | Facility: CLINIC | Age: 31
End: 2021-02-01

## 2021-02-01 ENCOUNTER — PATIENT OUTREACH (OUTPATIENT)
Dept: GASTROENTEROLOGY | Facility: CLINIC | Age: 31
End: 2021-02-01

## 2021-02-01 DIAGNOSIS — K50.10 CROHN'S DISEASE OF LARGE INTESTINE WITHOUT COMPLICATION (H): Primary | ICD-10-CM

## 2021-02-01 DIAGNOSIS — Z23 NEED FOR VACCINATION: Primary | ICD-10-CM

## 2021-02-01 PROCEDURE — 90732 PPSV23 VACC 2 YRS+ SUBQ/IM: CPT

## 2021-02-01 PROCEDURE — 90471 IMMUNIZATION ADMIN: CPT

## 2021-02-01 RX ORDER — ZOSTER VACCINE RECOMBINANT, ADJUVANTED 50 MCG/0.5
1 KIT INTRAMUSCULAR ONCE
Qty: 0.5 ML | Refills: 0 | OUTPATIENT
Start: 2021-02-01 | End: 2021-02-01

## 2021-02-01 NOTE — PROGRESS NOTES
Orders placed for the patient to have a shingrix vaccine. Orders also placed for the patient to continue stelara subcutaneous injection

## 2021-02-01 NOTE — NURSING NOTE
Prior to immunization administration, verified patients identity using patient s name and date of birth. Please see Immunization Activity for additional information.     Screening Questionnaire for Adult Immunization    Are you sick today?   No   Do you have allergies to medications, food, a vaccine component or latex?   Yes   Have you ever had a serious reaction after receiving a vaccination?   No   Do you have a long-term health problem with heart, lung, kidney, or metabolic disease (e.g., diabetes), asthma, a blood disorder, no spleen, complement component deficiency, a cochlear implant, or a spinal fluid leak?  Are you on long-term aspirin therapy?   No   Do you have cancer, leukemia, HIV/AIDS, or any other immune system problem?   No   Do you have a parent, brother, or sister with an immune system problem?   No   In the past 3 months, have you taken medications that affect  your immune system, such as prednisone, other steroids, or anticancer drugs; drugs for the treatment of rheumatoid arthritis, Crohn s disease, or psoriasis; or have you had radiation treatments?   Yes   Have you had a seizure, or a brain or other nervous system problem?   No   During the past year, have you received a transfusion of blood or blood    products, or been given immune (gamma) globulin or antiviral drug?   No   For women: Are you pregnant or is there a chance you could become       pregnant during the next month?   No   Have you received any vaccinations in the past 4 weeks?   Yes     Immunization questionnaire was positive for at least one answer.  Notified Dr. Lazar.        Per orders of Dr. Berger, injection of Pneumovax 23 given by Sarah Cortes MA. Patient instructed to remain in clinic for 15 minutes afterwards, and to report any adverse reaction to me immediately.       Screening performed by Sarah Cortes MA on 2/1/2021 at 1:37 PM.

## 2021-02-02 RX ORDER — USTEKINUMAB 90 MG/ML
INJECTION, SOLUTION SUBCUTANEOUS
Qty: 1 ML | Refills: 4 | Status: SHIPPED | OUTPATIENT
Start: 2021-02-02 | End: 2021-05-05

## 2021-02-08 RX ORDER — ZOSTER VACCINE RECOMBINANT, ADJUVANTED 50 MCG/0.5
1 KIT INTRAMUSCULAR ONCE
Qty: 0.5 ML | Refills: 0
Start: 2021-02-08 | End: 2021-02-08

## 2021-02-08 NOTE — TELEPHONE ENCOUNTER
Order placed for the shingrix vaccine placed in patients chart due to patients immunocompromised status.

## 2021-02-19 ENCOUNTER — OFFICE VISIT (OUTPATIENT)
Dept: DERMATOLOGY | Facility: CLINIC | Age: 31
End: 2021-02-19
Payer: COMMERCIAL

## 2021-02-19 DIAGNOSIS — Z12.83 SKIN CANCER SCREENING: Primary | ICD-10-CM

## 2021-02-19 DIAGNOSIS — D22.9 MULTIPLE BENIGN NEVI: ICD-10-CM

## 2021-02-19 DIAGNOSIS — L70.0 ACNE VULGARIS: ICD-10-CM

## 2021-02-19 PROCEDURE — 99214 OFFICE O/P EST MOD 30 MIN: CPT | Performed by: PHYSICIAN ASSISTANT

## 2021-02-19 RX ORDER — TRETINOIN 0.25 MG/G
CREAM TOPICAL
Qty: 45 G | Refills: 1 | Status: SHIPPED | OUTPATIENT
Start: 2021-02-19 | End: 2021-08-11

## 2021-02-19 ASSESSMENT — PAIN SCALES - GENERAL: PAINLEVEL: NO PAIN (0)

## 2021-02-19 NOTE — NURSING NOTE
"Chief Complaint   Patient presents with     Derm Problem     Summer is here today for a skin check. She states \" I had some acne appear in the last month. I have a few spots that concern me today\"     Saira Arevalo, RYAN  "

## 2021-02-19 NOTE — PATIENT INSTRUCTIONS
The ABCDEs of Melanoma    Skin cancer can develop anywhere on the skin. Ask someone for help when checking your skin, especially in hard to see places. If you notice a mole different from others, or that changes, enlarges, itches, or bleeds (even if it is small), you should see a dermatologist.              Sun protective clothing and Resources     Advanced Circulatory (www.Work Inspire)  Athleta (www.Smallaa)  Mirada Medical (www.Draker)  Carve Designs (BIScience) - affordable  Skinz (Arroweye Solutionsskinz.com)    Long sleeve - Molly Cool DRI UPF 50 or Hendricks PFG UPF 50  Hoodie - Hendricks PFG UPF 50  Swimshirt/Rash Guard - Carol UPF 50 (on Amazon)  Neck - Outdoor Research Ubertubes ([email or web address])

## 2021-02-19 NOTE — PROGRESS NOTES
"[facility] Dermatology Note  Encounter Date: Feb 19, 2021  Office Visit      Dermatology Problem List:  1. Crohn's disease on azathioprine and ustekinumab (Stelara)  2. Acne Vulgaris - tretinoin 0.025% cream  ____________________________________________    Assessment & Plan:  1. Full-body skin exam w/ Hx of Crohn's disease: on azathioprine and ustekinumab (Stelara). Occasional thumb lesion that gets larger/more keratinized when disease is active; does not appear active today.     Monthly self-skin checks    Discussed sun protective behaviors including sunscreen use and sun avoidance strategies.    If scalp lesion or similar lesions recur, pt to come back to derm clinic    # Multiple clinically benign nevi on the trunk and extremities.   - ABCDEs: Counseled ABCDEs of melanoma: Asymmetry, Border (irregularity), Color (not uniform, changes in color), Diameter (greater than 6 mm which is about the size of a pencil eraser), and Evolving (any changes in preexisting moles).  - Sun protection: Counseled SPF30+ sunscreen, UPF clothing, sun avoidance, tanning bed avoidance.     # Acne vulgaris.   -start tretinoin 0.025% cream nightly, a pea sized amount  -SA cleanser once or twice daily  -oil free products/make up    Procedures Performed:   None    Follow-up: 1 year(s) in-person for skin exam, 3mo virtually for acne recheck, or earlier for new or changing lesions    Staff:     All risks, benefits and alternatives were discussed with patient.  Patient is in agreement and understands the assessment and plan.  All questions were answered.    Roberta Vital PA-C, MPAS  [facility]: Phone: [phone], Fax: [phone]  [facility]: Phone: [phone],  Fax: [phone]  ____________________________________________    CC: Derm Problem (Summer is here today for a skin check. She states \" I had some acne appear in the " "last month. I have a few spots that concern me today\")      HPI:  Ms. Natalie Villa is a 30 year old female who presents today as a return patient for a skin exam. Hx of Chron's disease, on AZA and Stelara. Notes new dark \"dots\" on her palms and noticed a new mole show up on her R foot between her first and second toes. Additionally notes acne on the face, which really flared up this past January, concerned dietary changes may have triggered it. Has hx of mild acne in the past as a teenager, but nothing severe. No other concerns.     Patient is otherwise feeling well, without additional concerns.    Labs:  none    Physical Exam:  Vitals: There were no vitals taken for this visit.  SKIN: Full skin, which includes the head/face, both arms, chest, back, abdomen,both legs, genitalia and/or groin buttocks, digits and/or nails, was examined.   - Roldan's skin type III  - There are superifical acneiform papules with intermixed open and closed comedones on the face, more on the lower face.   -Multiple regular brown pigmented macules and papules are identified on the trunk and extremities, few very small macule on the palms of the hands.   - No other lesions of concern on areas examined.     Medications:  Current Outpatient Medications   Medication     acetaminophen (TYLENOL) 325 MG tablet     albuterol (PROAIR HFA/PROVENTIL HFA/VENTOLIN HFA) 108 (90 Base) MCG/ACT inhaler     azaTHIOprine (IMURAN) 50 MG tablet     calcium carbonate (OS-ELIEL) 500 MG tablet     ustekinumab (STELARA) 90 MG/ML     Vitamin D, Cholecalciferol, 1000 units CAPS     vancomycin (VANCOCIN) 125 MG capsule     No current facility-administered medications for this visit.       Past Medical/Surgical History:   Patient Active Problem List   Diagnosis     Crohn's disease of large intestine without complication (H)     Herpes zoster without complication     ASCUS with positive high risk HPV cervical     Chronic tonsillitis     Deep vein thrombosis " (DVT) of distal vein of left lower extremity (H)     Microcytic anemia     Vitamin D deficiency     Exercise-induced asthma     Cervical cancer screening     No past medical history on file.    CC Dr. Adan on close of this encounter.

## 2021-02-19 NOTE — LETTER
2/19/2021       RE: Natalie Villa  [address]  [address]  [facility] [address]     Dear Colleague,    Thank you for referring your patient, Natalie Villa, to the [facility] DERMATOLOGY [facility] [place] at [facility]. Please see a copy of my visit note below.    [facility] Dermatology Note  Encounter Date: Feb 19, 2021  Office Visit      Dermatology Problem List:  1. Crohn's disease on azathioprine and ustekinumab (Stelara)  2. Acne Vulgaris - tretinoin 0.025% cream  ____________________________________________    Assessment & Plan:  1. Full-body skin exam w/ Hx of Crohn's disease: on azathioprine and ustekinumab (Stelara). Occasional thumb lesion that gets larger/more keratinized when disease is active; does not appear active today.     Monthly self-skin checks    Discussed sun protective behaviors including sunscreen use and sun avoidance strategies.    If scalp lesion or similar lesions recur, pt to come back to derm clinic    # Multiple clinically benign nevi on the trunk and extremities.   - ABCDEs: Counseled ABCDEs of melanoma: Asymmetry, Border (irregularity), Color (not uniform, changes in color), Diameter (greater than 6 mm which is about the size of a pencil eraser), and Evolving (any changes in preexisting moles).  - Sun protection: Counseled SPF30+ sunscreen, UPF clothing, sun avoidance, tanning bed avoidance.     # Acne vulgaris.   -start tretinoin 0.025% cream nightly, a pea sized amount  -SA cleanser once or twice daily  -oil free products/make up    Procedures Performed:   None    Follow-up: 1 year(s) in-person for skin exam, 3mo virtually for acne recheck, or earlier for new or changing lesions    Staff:     All risks, benefits and alternatives were discussed with patient.  Patient is in agreement and understands the assessment and plan.  All questions were answered.    Roberta Vital PA-C, MPAS  [facility]  "[facility] [place]: Phone: [phone], Fax: [phone]  [facility]: Phone: [phone],  Fax: [phone]  ____________________________________________    CC: Derm Problem (Natalie is here today for a skin check. She states \" I had some acne appear in the last month. I have a few spots that concern me today\")      HPI:  Ms. Natalie Villa is a 30 year old female who presents today as a return patient for a skin exam. Hx of Chron's disease, on AZA and Stelara. Notes new dark \"dots\" on her palms and noticed a new mole show up on her R foot between her first and second toes. Additionally notes acne on the face, which really flared up this past January, concerned dietary changes may have triggered it. Has hx of mild acne in the past as a teenager, but nothing severe. No other concerns.     Patient is otherwise feeling well, without additional concerns.    Labs:  none    Physical Exam:  Vitals: There were no vitals taken for this visit.  SKIN: Full skin, which includes the head/face, both arms, chest, back, abdomen,both legs, genitalia and/or groin buttocks, digits and/or nails, was examined.   - Roldan's skin type III  - There are superifical acneiform papules with intermixed open and closed comedones on the face, more on the lower face.   -Multiple regular brown pigmented macules and papules are identified on the trunk and extremities, few very small macule on the palms of the hands.   - No other lesions of concern on areas examined.     Medications:  Current Outpatient Medications   Medication     acetaminophen (TYLENOL) 325 MG tablet     albuterol (PROAIR HFA/PROVENTIL HFA/VENTOLIN HFA) 108 (90 Base) MCG/ACT inhaler     azaTHIOprine (IMURAN) 50 MG tablet     calcium carbonate (OS-ELIEL) 500 MG tablet     ustekinumab (STELARA) 90 MG/ML     Vitamin D, Cholecalciferol, 1000 units CAPS     vancomycin (VANCOCIN) 125 MG capsule     No current " facility-administered medications for this visit.       Past Medical/Surgical History:   Patient Active Problem List   Diagnosis     Crohn's disease of large intestine without complication (H)     Herpes zoster without complication     ASCUS with positive high risk HPV cervical     Chronic tonsillitis     Deep vein thrombosis (DVT) of distal vein of left lower extremity (H)     Microcytic anemia     Vitamin D deficiency     Exercise-induced asthma     Cervical cancer screening     No past medical history on file.    CC Dr. Adan on close of this encounter.

## 2021-02-22 ENCOUNTER — MYC MEDICAL ADVICE (OUTPATIENT)
Dept: GASTROENTEROLOGY | Facility: CLINIC | Age: 31
End: 2021-02-22

## 2021-02-25 NOTE — TELEPHONE ENCOUNTER
Spoke to Summer last evening (2/24) and discussed her options with timing of the stelara injection and the shingrix vaccine.  patient will have both given to her on Thursday March 11 by me as then there will not be a delay in the vaccine any longer. Patient scheduled for March 11 at 8:30 am

## 2021-03-11 ENCOUNTER — ALLIED HEALTH/NURSE VISIT (OUTPATIENT)
Dept: GASTROENTEROLOGY | Facility: CLINIC | Age: 31
End: 2021-03-11
Payer: COMMERCIAL

## 2021-03-11 DIAGNOSIS — K50.90 CROHN'S DISEASE (H): Primary | ICD-10-CM

## 2021-03-11 PROCEDURE — 99207 PR NO CHARGE NURSE ONLY: CPT | Mod: 25

## 2021-03-11 NOTE — PROGRESS NOTES
Patient teaching performed today for stelara.  Injection training occurred patient understanding syringe and medication administration technique. Patient aware of side effects of medication and what to watch for.     Home medication given by patient without difficulty.  patient in room with me for 20 minutes after injection and no side effects occurred. patient aware that she will need her next injection in 8 weeks and has placed the reminder in her calender.     Summer says that she is feeling wonderful she is very happy that she made the switch to stelara she says that she has not felt this good in years.     patient was not given the shingrix vaccine today because of the opening of the COIVD vaccines for many individuals.

## 2021-03-23 ENCOUNTER — IMMUNIZATION (OUTPATIENT)
Dept: NURSING | Facility: CLINIC | Age: 31
End: 2021-03-23
Payer: COMMERCIAL

## 2021-03-23 PROCEDURE — 91300 PR COVID VAC PFIZER DIL RECON 30 MCG/0.3 ML IM: CPT

## 2021-03-23 PROCEDURE — 0001A PR COVID VAC PFIZER DIL RECON 30 MCG/0.3 ML IM: CPT

## 2021-03-25 DIAGNOSIS — K50.10 CROHN'S DISEASE OF LARGE INTESTINE WITHOUT COMPLICATION (H): ICD-10-CM

## 2021-03-26 NOTE — TELEPHONE ENCOUNTER
azaTHIOprine (IMURAN) 50 MG tablet    Last Written Prescription Date:  2/24/2020  Last Fill Quantity: 270,   # refills: 3  Last Office Visit : 12/1/2020  Future Office visit: 3/31/2021    Routing refill request to provider for review/approval because:  Refer to clinic for review due to does exceeds the recommended daily dose for Pt care.       Sarah Preston RN  Central Triage Red Flags/Med Refills

## 2021-03-31 ENCOUNTER — VIRTUAL VISIT (OUTPATIENT)
Dept: GASTROENTEROLOGY | Facility: CLINIC | Age: 31
End: 2021-03-31
Payer: COMMERCIAL

## 2021-03-31 VITALS — HEIGHT: 65 IN | BODY MASS INDEX: 29.19 KG/M2 | WEIGHT: 175.2 LBS

## 2021-03-31 DIAGNOSIS — K50.10 CROHN'S DISEASE OF LARGE INTESTINE WITHOUT COMPLICATION (H): ICD-10-CM

## 2021-03-31 PROCEDURE — 99214 OFFICE O/P EST MOD 30 MIN: CPT | Mod: 95 | Performed by: PHYSICIAN ASSISTANT

## 2021-03-31 RX ORDER — AZATHIOPRINE 50 MG/1
75 TABLET ORAL 2 TIMES DAILY
Qty: 270 TABLET | Refills: 3 | Status: SHIPPED | OUTPATIENT
Start: 2021-03-31

## 2021-03-31 ASSESSMENT — PAIN SCALES - GENERAL: PAINLEVEL: NO PAIN (0)

## 2021-03-31 ASSESSMENT — MIFFLIN-ST. JEOR: SCORE: 1515.58

## 2021-03-31 NOTE — PROGRESS NOTES
Natalie is a 30 year old who is being evaluated via a billable video visit.      How would you like to obtain your AVS? Brandizihart  If the video visit is dropped, the invitation should be resent by: Text to cell phone: [phone]  Will anyone else be joining your video visit? No      Video Start Time: 0824 AM    Video-Visit Details    Type of service:  Video Visit    Video End Time:0848 AM    Originating Location (pt. Location): Home    Distant Location (provider location):  [facility] [place]     Platform used for Video Visit: [facility]    GI CLINIC VISIT - ESTABLISHED PATIENT    CC/REFERRING PROVIDER: Referred Self  REASON FOR CONSULTATION: IBD follow up    HPI: 28 year old female with history of Crohn's vs UC on Vedolizumab (failed adalimumab, and questionable antibodies to infliximab, now on vedolizumab since 6/19/2015) + AZA, initially diagnosed with Crohn's in 2013. Her symptoms at the time were bloody diarrhea, left sided abdominal pain and general fatigue. She was treated with Humira for 1 year but stopped after colonoscopy showed ongoing disease. Then was treated with Remicade for about 18 months but per patient report, thinks she developed antibodies to it. She was started on Vedolizumab in June of 2015 initially at 300 mg Q8 weeks which was increased to Q6 weeks and mostly recently to Q4 weeks in May 2018 after colonoscopy in April 2018 showed ongoing colitis. She has been on AZA since diagnosis, initially at 200 mg Qday which has been titrated down to 100 mg Qday.     She had her first occurrence of C. difficile infection after the stool studies were completed after last visit in October 2018.  She completed a vancomycin taper.  Second occurrence (spontaneous without abx trigger) treated with vancomycin QID x 10 days then daily. Fecal esvin elevated to ~300, repeat cscope at that time showed Borrego 1 in descending, sigmoid and rectum.     She also continues to follow with  "hematology as she was diagnosed with a LLE DVT fall 2018 that was thought to be provoked and completed therapy with Eliquis.      Interval history, 5/2020 (virtual visit)  Continues on VDZ q4w + AZA 75 mg BID. Last infusion 5/8/2020 (4/10/2020). Of note, CRP was 25.   Had met with Dr. Obrien. Tried the Whole 30 diet Mar-Apr. Stool was inconsistent at that time in the s/o increased fiber use (had loose stools up to 7 x per day). Stool normalized after liberalizing diet.     Using \"GI monitor\" brandon. Having ~4 BMs per day. Minimal urgency, mixed consistency stool, no blood. No abd pain.     Reports feeling well compared to the past. Seems to be inconsistent with recent CRP elevation.     Interval history, 9/2020 (video visit)  Saw Dr. Dodson with plans for a fecal calprotectin check in the setting of suppressive vancomycin. FC was 307.   Feeling \"pretty good\".   No abd pain. Having 3-4 BMs per day (improved). No blood. No urgency. Has not been dieting and exercising as much as before.   No joint pain or rashes.   Has been sober since March; has found this challenging.   Last Entyvio infusion was 8/28    Interval history, 12/2020 (video visit)  FC was elevated and icscope findings below, with active disease.   Having 3-4 well formed BMs per day. No blood. No urgency or abd pain. Weight has been stable.     Interval history 3/31/21 (video visit)  Feeling great! 1-2 formed stool per day, no blood in the stool. No urgency or nighttime stools. Stopped Vancomycin a couple weeks after loading dose of stelara. Continues on AZA 75 mg BID. Next injection is 5/6/21.  Had first COVID vaccine 1-2 weeks ago.     ROS: 10pt ROS performed and otherwise negative.    PERTINENT PAST MEDICAL HISTORY:  As noted above.    PREVIOUS ABDOMINAL/GYNECOLOGIC SURGERIES:  None     PREVIOUS ENDOSCOPY:  Colonoscopy 4/23/2018 at [facility]: Mild colitis starting at the transverse colon (Borrego 1), progressing to more moderate colitis in the rectosigmoid " (Borrego 2). Bx showed chronic colitis. TI and right colon were unremarkable endoscopically and bx showed mild chronic activity.     Flex sig 10/2/2018 shows Borrego 2/3, continuous, biopsies show moderate chronic active colitis.    Colonoscopy 5/2019 shows  Borrego 1 in descending, sigmoid and rectum. Bx show mild active chronic colitis with cryptitis and focal erosion.     icscope 11/2020 showed SES-CD 12  PATH  SPECIMEN(S):   A: Colon biopsies at 70 cm   B: Colon biopsy, white patch at 65 cm   C: Colon biopsies at 60 cm   D: Colon biopsies at 50 cm   E: Colon biopsies at 40 cm   F: Colon biopsies at 30 cm   G: Colon biopsies at 20 cm   H: Colon biopsies at 10 cm     FINAL DIAGNOSIS:   A. Colon, Biopsies at 70 cm:   Colonic mucosa with no granulomas, cryptitis or other histologic evidence   of active Crohn; negative for   dysplasia     B. Colon, Biopsy of White Patch at 65 cm:   Colonic mucosa with mild crypt architecture distortion, evidence of prior   injury; no granulomas, cryptitis or   other histologic evidence of active Crohn; negative for dysplasia     C. Colon, Biopsies at 60 cm:   Colonic mucosa with mild fibrosis and crypt architecture distortion,   evidence of prior injury; no granulomas,   cryptitis or other histologic evidence of active Crohn; negative for   dysplasia     D. Colon, Biopsies at 50 cm:   Chronic active colitis with occasional crypt abscess, one small granuloma,    and crypt architectural distortion;   consistent with mildly active Crohn; negative for dysplasia; report of CMV    immunohistochemistry to follow     E. Colon, Biopsies at 40 cm:   Moderately active chronic colitis with crypt abscesses and crypt   distortion; negative for dysplasia; report of   CMV immunohistochemistry to follow     F. Colon, Biopsies at 30 cm:   Moderately active chronic colitis with crypt injuries and crypt   distortion; negative for dysplasia; report of   CMV immunohistochemistry to follow     G. Colon, Biopsies at  20 cm:   Moderately active chronic colitis with crypt injuries and crypt   distortion; negative for dysplasia; report of   CMV immunohistochemistry to follow     H. Colon, Biopsies at 10 cm:   Moderately active chronic colitis with crypt injuries and crypt   distortion; negative for dysplasia; report of   CMV immunohistochemistry to follow      PERTINENT MEDICATIONS:  - Azathioprine 75 mg BID  - Stelara every 8 weeks subQ injection (started 1/2021)   - Maria Victoria OCP   Medications reviewed with patient today, see Medication List/Assessment for details.  No other NSAID/anticoagulation reported by patient.  No other OTC/herbal/supplements reported by patient.    SOCIAL HISTORY:  - Works as   - Now sober from ETOH  - Smokes marijuana 2-3 times/week  - No tobacco   - Sexually active, uses condoms      FAMILY HISTORY:  No colon/panc/esophageal/other GI CA, no other Valverde or other HPS-related Keo. No IBD/celiac, no other AI/liver/thyroid disease. + for asthma and acne.     PHYSICAL EXAMINATION:   General appearance  Healthy appearing adult, in no acute distress     Eyes  Sclera anicteric  Pupils round and reactive to light     Ears, nose, mouth and throat  No obvious external lesions of ears and nose  Hearing intact     Neck  Symmetric  No obvious external lesions     Respiratory  Normal respiration, no use of accessory muscles      MSK  Gait normal     Skin  No rashes or jaundice      Psychiatric  Oriented to person, place and time  Appropriate mood and affect.     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  She has had her loading dose and 1 injection, next injection is in May and clinically feeling well. We will continue with current plan and reassess endoscopically in about 6 months.     # Indeterminate colitis, on Stelara + AZA  # History of DVT  # Recurrent Cdiff infection  PLAN  ---Continue stelara  -- Continue AZA 75 mg BID    -- Ok to discontinue vanco (this was discontinued a few weeks after loading dose and she continues to well).    # LLE DVT:   thought to be provoked (factors include prolonged car ride from [place] to [place] when moved here this summer, strain to gastrocnemius muscle on crutches for 6 weeks, on birth control and active IBD) and was previously on Eliquis.   She discontinued oral contraceptives. Will not favor tofacitinib has a therapy given DVT history.    # IBD health maintenance   --Greatly appreciate [facility] pharmacy    Vaccinations:  -- Influenza (every year): Last given 2020  -- TdaP (every 10 years): Last given 2014  -- Pneumococcal Pneumonia (once then every 5 years): Last given 2015  -- Yearly assessment for latent Tb (verbal screening and exam, PPD or QuantiFERON-Tb testing): Indeterminate due to anergy 2017     One time confirmation of immunity or serologies:  -- Hepatitis A (serologies or immunizations): Unknown  -- Hepatitis B (serologies or immunizations): Vaccinated  -- Varicella: had chicken pox as a child  -- MMR: [place]   -- HPV (all aged 18-26): Up to date  -- Meningococcal meningitis (all patients at risk for meningitis): Unknown  -- Due to the immunosuppression in this patient, I would not advise administration of live vaccines such as varicella/VZV, intranasal influenza, MMR, or yellow fever vaccine (if travelling).      Bone mineral density screening   -- DEXA WNL 2015    Cancer Screening:  Colon cancer screening: due 2020  Cervical cancer screening: Annual due to immunosuppression (completed 1/5/21)    PERTINENT STUDIES:  CBC RESULTS:   Recent Labs   Lab Test 02/14/20  1505   WBC 7.0   RBC 3.76*   HGB 11.7   HCT 36.4   MCV 97   MCH 31.1   MCHC 32.1   RDW 12.0        CRP Inflammation   Date Value Ref Range Status   12/18/2020 <2.9 0.0 - 8.0 mg/L Final     RTC in 3 months     Thank you for this consultation.  35 minutes spent on the date of the encounter doing chart review, history and exam,  documentation and further activities as noted above.  It was a pleasure to participate in the care of this patient; please contact us with any further questions.      Guero Arias PA-C  Division of Gastroenterology, Hepatology and Nutrition  [facility]

## 2021-03-31 NOTE — PATIENT INSTRUCTIONS
It was a pleasure taking care of you today.  I've included a brief summary of our discussion and care plan from today's visit below.  Please review this information with your primary care provider.  ______________________________________________________________________    My recommendations are summarized as follows:    -- Continue stelara every 8 weeks and azthioprine 75 mg twice daily   -- Labs due (orders are in)  The order for this is in, can be completed at our lab. To schedule lab appointment here, use my chart or call [phone].   -- Next endoscopic assessment: 6 months  -- Patient with IBD we recommend supplementation vitamin D 1000 units daily and calcium 500 mg twice daily.  -- Vaccines/immunizations to be updated: all up to date   -- Yearly Dermatology visit for skin check while on immunosuppressive therapy. Can call [phone] to schedule.  -- Yearly pap smear while on immunosuppressive therapy  -- No NSAIDs (ibuprofen, or anything containing ibuprofen)     For additional resources about inflammatory bowel disease visit [email or web address]    Return to GI Clinic in 3 months to review your progress.    ______________________________________________________________________    Who do I call with any questions after my visit?  Please be in touch if there are any further questions that arise following today's visit.  There are multiple ways to contact your gastroenterology care team.        During business hours, you may reach a Gastroenterology nurse at [phone], option 3.       To schedule or reschedule an appointment, please call [phone].       You can always send a secure message through [employer].  [employer] messages are answered by your nurse or doctor typically within 24 hours.  Please allow extra time on weekends and holidays.        For urgent/emergent questions after business hours, you may reach the on-call GI Fellow by contacting the [facility]  at  [phone].      In order for your refill to be processed in a timely fashion, it is your responsibility to ensure you follow the recommendations from your provider regarding your laboratory studies and follow up appointments.       How will I get the results of any tests ordered?    You will receive all of your results.  If you have signed up for F[employer]re[employer], any tests ordered at your visit will be available to you after your physician reviews them.  Typically this takes 1-2 weeks.  If there are urgent results that require a change in your care plan, your physician or nurse will call you to discuss the next steps.      What is [employer]unny Or [employer]?  [employer] Or Die is a secure way for you to access all of your healthcare records from the [facility].  It is a web based computer program, so you can sign on to it from any location.  It also allows you to send secure messages to your care team.  I recommend signing up for Funny Or Die access if you have not already done so and are comfortable with using a computer.      How to I schedule a follow-up visit?  If you did not schedule a follow-up visit today, please call [phone] to schedule a follow-up office visit.        Sincerely,    Guero Arias PA-C  [facility]  Division of Gastroenterology

## 2021-03-31 NOTE — NURSING NOTE
"Chief Complaint   Patient presents with     RECHECK     Follow up - Video visit.        Vitals:    03/31/21 0759   Weight: 79.5 kg (175 lb 3.2 oz)   Height: 1.651 m (5' 5\")       Body mass index is 29.15 kg/m .    Kimberley Tucker LPN      "

## 2021-03-31 NOTE — LETTER
3/31/2021         RE: Natalie Villa  [address]  [address]  [facility] [address]        Dear Colleague,    Thank you for referring your patient, Natalie Villa, to the [facility] [place]. Please see a copy of my visit note below.    Natalie is a 30 year old who is being evaluated via a billable video visit.      How would you like to obtain your AVS? MyChart  If the video visit is dropped, the invitation should be resent by: Text to cell phone: [phone]  Will anyone else be joining your video visit? No      Video-Visit Details    Type of service:  Video Visit    Video Start Time: 0824 AM    Video End Time:0848 AM    Originating Location (pt. Location): Home    Distant Location (provider location):  [facility] [place]     Platform used for Video Visit: Minus    GI CLINIC VISIT - ESTABLISHED PATIENT    CC/REFERRING PROVIDER: Referred Self  REASON FOR CONSULTATION: IBD follow up    HPI: 28 year old female with history of Crohn's vs UC on Vedolizumab (failed adalimumab, and questionable antibodies to infliximab, now on vedolizumab since 6/19/2015) + AZA, initially diagnosed with Crohn's in 2013. Her symptoms at the time were bloody diarrhea, left sided abdominal pain and general fatigue. She was treated with Humira for 1 year but stopped after colonoscopy showed ongoing disease. Then was treated with Remicade for about 18 months but per patient report, thinks she developed antibodies to it. She was started on Vedolizumab in June of 2015 initially at 300 mg Q8 weeks which was increased to Q6 weeks and mostly recently to Q4 weeks in May 2018 after colonoscopy in April 2018 showed ongoing colitis. She has been on AZA since diagnosis, initially at 200 mg Qday which has been titrated down to 100 mg Qday.     She had her first occurrence of C. difficile infection after the stool studies were completed after last visit in October 2018.  She  "completed a vancomycin taper.  Second occurrence (spontaneous without abx trigger) treated with vancomycin QID x 10 days then daily. Fecal esvin elevated to ~300, repeat cscope at that time showed Borrego 1 in descending, sigmoid and rectum.     She also continues to follow with hematology as she was diagnosed with a LLE DVT fall 2018 that was thought to be provoked and completed therapy with Eliquis.      Interval history, 5/2020 (virtual visit)  Continues on VDZ q4w + AZA 75 mg BID. Last infusion 5/8/2020 (4/10/2020). Of note, CRP was 25.   Had met with Dr. Obrien. Tried the Whole 30 diet Mar-Apr. Stool was inconsistent at that time in the s/o increased fiber use (had loose stools up to 7 x per day). Stool normalized after liberalizing diet.     Using \"GI monitor\" brandon. Having ~4 BMs per day. Minimal urgency, mixed consistency stool, no blood. No abd pain.     Reports feeling well compared to the past. Seems to be inconsistent with recent CRP elevation.     Interval history, 9/2020 (video visit)  Saw Dr. Dodson with plans for a fecal calprotectin check in the setting of suppressive vancomycin. FC was 307.   Feeling \"pretty good\".   No abd pain. Having 3-4 BMs per day (improved). No blood. No urgency. Has not been dieting and exercising as much as before.   No joint pain or rashes.   Has been sober since March; has found this challenging.   Last Entyvio infusion was 8/28    Interval history, 12/2020 (video visit)  FC was elevated and icscope findings below, with active disease.   Having 3-4 well formed BMs per day. No blood. No urgency or abd pain. Weight has been stable.     Interval history 3/31/21 (video visit)  Feeling great! 1-2 formed stool per day, no blood in the stool. No urgency or nighttime stools. Stopped Vancomycin a couple weeks after loading dose of stelara. Continues on AZA 75 mg BID. Next injection is 5/6/21.  Had first COVID vaccine 1-2 weeks ago.     ROS: 10pt ROS performed and otherwise " negative.    PERTINENT PAST MEDICAL HISTORY:  As noted above.    PREVIOUS ABDOMINAL/GYNECOLOGIC SURGERIES:  None     PREVIOUS ENDOSCOPY:  Colonoscopy 4/23/2018 at [facility]: Mild colitis starting at the transverse colon (Borrego 1), progressing to more moderate colitis in the rectosigmoid (Borrego 2). Bx showed chronic colitis. TI and right colon were unremarkable endoscopically and bx showed mild chronic activity.     Flex sig 10/2/2018 shows Borrego 2/3, continuous, biopsies show moderate chronic active colitis.    Colonoscopy 5/2019 shows  Borrego 1 in descending, sigmoid and rectum. Bx show mild active chronic colitis with cryptitis and focal erosion.     icscope 11/2020 showed SES-CD 12  PATH  SPECIMEN(S):   A: Colon biopsies at 70 cm   B: Colon biopsy, white patch at 65 cm   C: Colon biopsies at 60 cm   D: Colon biopsies at 50 cm   E: Colon biopsies at 40 cm   F: Colon biopsies at 30 cm   G: Colon biopsies at 20 cm   H: Colon biopsies at 10 cm     FINAL DIAGNOSIS:   A. Colon, Biopsies at 70 cm:   Colonic mucosa with no granulomas, cryptitis or other histologic evidence   of active Crohn; negative for   dysplasia     B. Colon, Biopsy of White Patch at 65 cm:   Colonic mucosa with mild crypt architecture distortion, evidence of prior   injury; no granulomas, cryptitis or   other histologic evidence of active Crohn; negative for dysplasia     C. Colon, Biopsies at 60 cm:   Colonic mucosa with mild fibrosis and crypt architecture distortion,   evidence of prior injury; no granulomas,   cryptitis or other histologic evidence of active Crohn; negative for   dysplasia     D. Colon, Biopsies at 50 cm:   Chronic active colitis with occasional crypt abscess, one small granuloma,    and crypt architectural distortion;   consistent with mildly active Crohn; negative for dysplasia; report of CMV    immunohistochemistry to follow     E. Colon, Biopsies at 40 cm:   Moderately active chronic colitis with crypt abscesses and crypt    distortion; negative for dysplasia; report of   CMV immunohistochemistry to follow     F. Colon, Biopsies at 30 cm:   Moderately active chronic colitis with crypt injuries and crypt   distortion; negative for dysplasia; report of   CMV immunohistochemistry to follow     G. Colon, Biopsies at 20 cm:   Moderately active chronic colitis with crypt injuries and crypt   distortion; negative for dysplasia; report of   CMV immunohistochemistry to follow     H. Colon, Biopsies at 10 cm:   Moderately active chronic colitis with crypt injuries and crypt   distortion; negative for dysplasia; report of   CMV immunohistochemistry to follow      PERTINENT MEDICATIONS:  - Azathioprine 75 mg BID  - Stelara every 8 weeks subQ injection (started 1/2021)   - Maria Victoria OCP   Medications reviewed with patient today, see Medication List/Assessment for details.  No other NSAID/anticoagulation reported by patient.  No other OTC/herbal/supplements reported by patient.    SOCIAL HISTORY:  - Works as   - Now sober from ETOH  - Smokes marijuana 2-3 times/week  - No tobacco   - Sexually active, uses condoms      FAMILY HISTORY:  No colon/panc/esophageal/other GI CA, no other Valverde or other HPS-related Keo. No IBD/celiac, no other AI/liver/thyroid disease. + for asthma and acne.     PHYSICAL EXAMINATION:   General appearance  Healthy appearing adult, in no acute distress     Eyes  Sclera anicteric  Pupils round and reactive to light     Ears, nose, mouth and throat  No obvious external lesions of ears and nose  Hearing intact     Neck  Symmetric  No obvious external lesions     Respiratory  Normal respiration, no use of accessory muscles      MSK  Gait normal     Skin  No rashes or jaundice      Psychiatric  Oriented to person, place and time  Appropriate mood and affect.     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  She has had her loading dose and  1 injection, next injection is in May and clinically feeling well. We will continue with current plan and reassess endoscopically in about 6 months.     # Indeterminate colitis, on Stelara + AZA  # History of DVT  # Recurrent Cdiff infection  PLAN  ---Continue stelara  -- Continue AZA 75 mg BID   -- Ok to discontinue vanco (this was discontinued a few weeks after loading dose and she continues to well).    # LLE DVT:   thought to be provoked (factors include prolonged car ride from [place] to [place] when moved here this summer, strain to gastrocnemius muscle on crutches for 6 weeks, on birth control and active IBD) and was previously on Eliquis.   She discontinued oral contraceptives. Will not favor tofacitinib has a therapy given DVT history.    # IBD health maintenance   --Greatly appreciate [facility] pharmacy    Vaccinations:  -- Influenza (every year): Last given 2020  -- TdaP (every 10 years): Last given 2014  -- Pneumococcal Pneumonia (once then every 5 years): Last given 2015  -- Yearly assessment for latent Tb (verbal screening and exam, PPD or QuantiFERON-Tb testing): Indeterminate due to anergy 2017     One time confirmation of immunity or serologies:  -- Hepatitis A (serologies or immunizations): Unknown  -- Hepatitis B (serologies or immunizations): Vaccinated  -- Varicella: had chicken pox as a child  -- MMR: [place]   -- HPV (all aged 18-26): Up to date  -- Meningococcal meningitis (all patients at risk for meningitis): Unknown  -- Due to the immunosuppression in this patient, I would not advise administration of live vaccines such as varicella/VZV, intranasal influenza, MMR, or yellow fever vaccine (if travelling).      Bone mineral density screening   -- DEXA WNL 2015    Cancer Screening:  Colon cancer screening: due 2020  Cervical cancer screening: Annual due to immunosuppression (completed 1/5/21)    PERTINENT STUDIES:  CBC RESULTS:   Recent Labs   Lab Test 02/14/20  1505   WBC 7.0   RBC 3.76*   HGB 11.7    HCT 36.4   MCV 97   MCH 31.1   MCHC 32.1   RDW 12.0        CRP Inflammation   Date Value Ref Range Status   12/18/2020 <2.9 0.0 - 8.0 mg/L Final     RTC in 3 months     Thank you for this consultation.  35 minutes spent on the date of the encounter doing chart review, history and exam, documentation and further activities as noted above.  It was a pleasure to participate in the care of this patient; please contact us with any further questions.      Guero Arias PA-C  Division of Gastroenterology, Hepatology and Nutrition  [facility]

## 2021-04-04 RX ORDER — AZATHIOPRINE 50 MG/1
75 TABLET ORAL 2 TIMES DAILY
Qty: 270 TABLET | Refills: 3 | OUTPATIENT
Start: 2021-04-04

## 2021-04-13 ENCOUNTER — IMMUNIZATION (OUTPATIENT)
Dept: NURSING | Facility: CLINIC | Age: 31
End: 2021-04-13
Attending: INTERNAL MEDICINE
Payer: COMMERCIAL

## 2021-04-13 PROCEDURE — 0002A PR COVID VAC PFIZER DIL RECON 30 MCG/0.3 ML IM: CPT

## 2021-04-13 PROCEDURE — 91300 PR COVID VAC PFIZER DIL RECON 30 MCG/0.3 ML IM: CPT

## 2021-05-05 ENCOUNTER — PATIENT OUTREACH (OUTPATIENT)
Dept: GASTROENTEROLOGY | Facility: CLINIC | Age: 31
End: 2021-05-05
Payer: COMMERCIAL

## 2021-05-05 ENCOUNTER — TELEPHONE (OUTPATIENT)
Dept: GASTROENTEROLOGY | Facility: CLINIC | Age: 31
End: 2021-05-05

## 2021-05-05 DIAGNOSIS — K50.10 CROHN'S DISEASE OF LARGE INTESTINE WITHOUT COMPLICATION (H): ICD-10-CM

## 2021-05-05 RX ORDER — USTEKINUMAB 90 MG/ML
INJECTION, SOLUTION SUBCUTANEOUS
Qty: 1 ML | Refills: 4 | Status: SHIPPED | OUTPATIENT
Start: 2021-05-05 | End: 2021-08-16

## 2021-05-05 NOTE — TELEPHONE ENCOUNTER
Called summer and left message requesting that she return my call.  Sh returned my call immediatly and she informed us that  Her insurance is changing pharmacies and her medication needs to be moved tho that pharmacy for filling.  The script was sent and patient should be contacted to schedule delivery.

## 2021-05-05 NOTE — TELEPHONE ENCOUNTER
[facility] Call Center    Phone Message    May a detailed message be left on voicemail: yes     Reason for Call: Other: Patient is calling in asking for a call back. She states that her insurance changed where her specialty pharmacy prescriptions can go within the last week to [facility]. She has asked that Dr. Berger calls them at [phone] to get her medication sent there. She also needs to know what the next steps are if she cannot take her medication on 5/6. Please call back as soon as possible to discuss.    Action Taken: Message routed to:  [facility]    Travel Screening: Not Applicable

## 2021-05-10 ENCOUNTER — TELEPHONE (OUTPATIENT)
Dept: GASTROENTEROLOGY | Facility: CLINIC | Age: 31
End: 2021-05-10

## 2021-05-10 NOTE — TELEPHONE ENCOUNTER
[facility] Call Center    Phone Message    May a detailed message be left on voicemail: yes     Reason for Call: Medication Question or concern regarding medication   Prescription Clarification  Name of Medication:     ustekinumab (STELARA) 90 MG/ML  Prescribing Provider:  Dr Berger    Pharmacy:[address]   What on the order needs clarification?     [facility] is calling in to confirm this medication.  Please send a new prescription to [phone]        Action Taken: Message routed to:  [facility] Gastro    Travel Screening: Not Applicable

## 2021-06-07 NOTE — PROGRESS NOTES
Summer called in stating that she is due for another c.diff testing prior to her colonoscopy in november .  Will mail out the stool testing kit to the patient.    [FreeTextEntry1] : I, Preethi Antonio acting as a scribe for Dr. Yuliya Levin on Jun 07, 2021  at 9:12 AM\par

## 2021-07-12 ENCOUNTER — LAB (OUTPATIENT)
Dept: LAB | Facility: CLINIC | Age: 31
End: 2021-07-12
Payer: COMMERCIAL

## 2021-07-12 DIAGNOSIS — K50.10 CROHN'S DISEASE OF LARGE INTESTINE WITHOUT COMPLICATION (H): ICD-10-CM

## 2021-07-12 LAB
ALBUMIN SERPL-MCNC: 4.2 G/DL (ref 3.4–5)
ALP SERPL-CCNC: 56 U/L (ref 40–150)
ALT SERPL W P-5'-P-CCNC: 12 U/L (ref 0–50)
ANION GAP SERPL CALCULATED.3IONS-SCNC: 4 MMOL/L (ref 3–14)
AST SERPL W P-5'-P-CCNC: 14 U/L (ref 0–45)
BASOPHILS # BLD AUTO: 0.1 10E3/UL (ref 0–0.2)
BASOPHILS NFR BLD AUTO: 1 %
BILIRUB SERPL-MCNC: 1 MG/DL (ref 0.2–1.3)
BUN SERPL-MCNC: 5 MG/DL (ref 7–30)
CALCIUM SERPL-MCNC: 9.2 MG/DL (ref 8.5–10.1)
CHLORIDE BLD-SCNC: 106 MMOL/L (ref 94–109)
CO2 SERPL-SCNC: 26 MMOL/L (ref 20–32)
CREAT SERPL-MCNC: 0.77 MG/DL (ref 0.52–1.04)
CRP SERPL-MCNC: <2.9 MG/L (ref 0–8)
EOSINOPHIL # BLD AUTO: 0.2 10E3/UL (ref 0–0.7)
EOSINOPHIL NFR BLD AUTO: 4 %
ERYTHROCYTE [DISTWIDTH] IN BLOOD BY AUTOMATED COUNT: 12.1 % (ref 10–15)
ERYTHROCYTE [SEDIMENTATION RATE] IN BLOOD BY WESTERGREN METHOD: 8 MM/HR (ref 0–20)
GFR SERPL CREATININE-BSD FRML MDRD: >90 ML/MIN/1.73M2
GLUCOSE BLD-MCNC: 93 MG/DL (ref 70–99)
HCT VFR BLD AUTO: 37.3 % (ref 35–47)
HGB BLD-MCNC: 12.7 G/DL (ref 11.7–15.7)
IMM GRANULOCYTES # BLD: 0 10E3/UL
IMM GRANULOCYTES NFR BLD: 0 %
LYMPHOCYTES # BLD AUTO: 1 10E3/UL (ref 0.8–5.3)
LYMPHOCYTES NFR BLD AUTO: 18 %
MCH RBC QN AUTO: 32.3 PG (ref 26.5–33)
MCHC RBC AUTO-ENTMCNC: 34 G/DL (ref 31.5–36.5)
MCV RBC AUTO: 95 FL (ref 78–100)
MONOCYTES # BLD AUTO: 0.5 10E3/UL (ref 0–1.3)
MONOCYTES NFR BLD AUTO: 8 %
NEUTROPHILS # BLD AUTO: 3.9 10E3/UL (ref 1.6–8.3)
NEUTROPHILS NFR BLD AUTO: 69 %
NRBC # BLD AUTO: 0 10E3/UL
NRBC BLD AUTO-RTO: 0 /100
PLATELET # BLD AUTO: 261 10E3/UL (ref 150–450)
POTASSIUM BLD-SCNC: 3.8 MMOL/L (ref 3.4–5.3)
PROT SERPL-MCNC: 8.3 G/DL (ref 6.8–8.8)
RBC # BLD AUTO: 3.93 10E6/UL (ref 3.8–5.2)
SODIUM SERPL-SCNC: 136 MMOL/L (ref 133–144)
WBC # BLD AUTO: 5.7 10E3/UL (ref 4–11)

## 2021-07-12 PROCEDURE — 80299 QUANTITATIVE ASSAY DRUG: CPT | Mod: 90 | Performed by: PATHOLOGY

## 2021-07-12 PROCEDURE — 80053 COMPREHEN METABOLIC PANEL: CPT | Performed by: PATHOLOGY

## 2021-07-12 PROCEDURE — 86140 C-REACTIVE PROTEIN: CPT | Performed by: PATHOLOGY

## 2021-07-12 PROCEDURE — 85025 COMPLETE CBC W/AUTO DIFF WBC: CPT | Performed by: PATHOLOGY

## 2021-07-12 PROCEDURE — 85652 RBC SED RATE AUTOMATED: CPT | Performed by: PATHOLOGY

## 2021-07-12 PROCEDURE — 36415 COLL VENOUS BLD VENIPUNCTURE: CPT | Performed by: PATHOLOGY

## 2021-07-13 ENCOUNTER — LAB (OUTPATIENT)
Dept: LAB | Facility: CLINIC | Age: 31
End: 2021-07-13
Payer: COMMERCIAL

## 2021-07-13 DIAGNOSIS — K50.10 CROHN'S DISEASE OF LARGE INTESTINE WITHOUT COMPLICATION (H): ICD-10-CM

## 2021-07-13 LAB
C COLI+JEJUNI+LARI FUSA STL QL NAA+PROBE: NOT DETECTED
C DIFF TOX B STL QL: NEGATIVE
EC STX1 GENE STL QL NAA+PROBE: NOT DETECTED
EC STX2 GENE STL QL NAA+PROBE: NOT DETECTED
NOROV GI+II ORF1-ORF2 JNC STL QL NAA+PR: NOT DETECTED
RVA NSP5 STL QL NAA+PROBE: NOT DETECTED
SALMONELLA SP RPOD STL QL NAA+PROBE: NOT DETECTED
SHIGELLA SP+EIEC IPAH STL QL NAA+PROBE: NOT DETECTED
V CHOL+PARA RFBL+TRKH+TNAA STL QL NAA+PR: NOT DETECTED
Y ENTERO RECN STL QL NAA+PROBE: NOT DETECTED

## 2021-07-13 PROCEDURE — 87493 C DIFF AMPLIFIED PROBE: CPT | Mod: 90 | Performed by: PATHOLOGY

## 2021-07-13 PROCEDURE — 83993 ASSAY FOR CALPROTECTIN FECAL: CPT | Mod: 90 | Performed by: PATHOLOGY

## 2021-07-13 PROCEDURE — 87329 GIARDIA AG IA: CPT | Mod: 90 | Performed by: PATHOLOGY

## 2021-07-13 PROCEDURE — 87328 CRYPTOSPORIDIUM AG IA: CPT | Mod: 90 | Performed by: PATHOLOGY

## 2021-07-13 PROCEDURE — 87506 IADNA-DNA/RNA PROBE TQ 6-11: CPT | Mod: 90 | Performed by: PATHOLOGY

## 2021-07-14 LAB
C PARVUM AG STL QL IA: NEGATIVE
G LAMBLIA AG STL QL IA: NEGATIVE

## 2021-07-19 LAB — CALPROTECTIN STL-MCNT: 1100 MG/KG (ref 0–49.9)

## 2021-07-22 LAB — 6-TGN ENTSUB RBC: NORMAL

## 2021-07-31 ENCOUNTER — TELEPHONE (OUTPATIENT)
Dept: GASTROENTEROLOGY | Facility: CLINIC | Age: 31
End: 2021-07-31

## 2021-07-31 NOTE — TELEPHONE ENCOUNTER
Reach out to patient to help schedule 3 month visits with Dr Arias from last visit on March 2, 2021. Left message with GI number to call to schedule.

## 2021-08-11 ENCOUNTER — PATIENT OUTREACH (OUTPATIENT)
Dept: GASTROENTEROLOGY | Facility: CLINIC | Age: 31
End: 2021-08-11

## 2021-08-11 ENCOUNTER — VIRTUAL VISIT (OUTPATIENT)
Dept: GASTROENTEROLOGY | Facility: CLINIC | Age: 31
End: 2021-08-11
Payer: COMMERCIAL

## 2021-08-11 VITALS — HEIGHT: 66 IN | WEIGHT: 171 LBS | BODY MASS INDEX: 27.48 KG/M2

## 2021-08-11 DIAGNOSIS — K50.111 CROHN'S DISEASE OF LARGE INTESTINE WITH RECTAL BLEEDING (H): Primary | ICD-10-CM

## 2021-08-11 PROCEDURE — 99214 OFFICE O/P EST MOD 30 MIN: CPT | Mod: 95 | Performed by: PHYSICIAN ASSISTANT

## 2021-08-11 ASSESSMENT — MIFFLIN-ST. JEOR: SCORE: 1507.4

## 2021-08-11 ASSESSMENT — PAIN SCALES - GENERAL: PAINLEVEL: MODERATE PAIN (4)

## 2021-08-11 NOTE — NURSING NOTE
"Chief Complaint   Patient presents with     RECHECK     Discuss colitis.       Vitals:    08/11/21 0713   Weight: 77.6 kg (171 lb)   Height: 1.676 m (5' 6\")       Body mass index is 27.6 kg/m .                          Saira Zhang, EMT  "

## 2021-08-11 NOTE — PATIENT INSTRUCTIONS
It was a pleasure taking care of you today.  I've included a brief summary of our discussion and care plan from today's visit below.  Please review this information with your primary care provider.  ______________________________________________________________________    My recommendations are summarized as follows:    -- Continue stelara every 8 weeks and azthioprine 75 mg twice daily   -- Labs due (orders are in)  The order for this is in, can be completed at our lab. To schedule lab appointment here, use my chart or call [phone].   -- Next endoscopic assessment: 6 months  -- Patient with IBD we recommend supplementation vitamin D 1000 units daily and calcium 500 mg twice daily.  -- Vaccines/immunizations to be updated: all up to date   -- Yearly Dermatology visit for skin check while on immunosuppressive therapy. Can call [phone] to schedule.  -- Yearly pap smear while on immunosuppressive therapy  -- No NSAIDs (ibuprofen, or anything containing ibuprofen)     For additional resources about inflammatory bowel disease visit [email or web address]    Return to GI Clinic in 3 months to review your progress.    ______________________________________________________________________    Who do I call with any questions after my visit?  Please be in touch if there are any further questions that arise following today's visit.  There are multiple ways to contact your gastroenterology care team.        During business hours, you may reach a Gastroenterology nurse at [phone], option 3.       To schedule or reschedule an appointment, please call [phone].       You can always send a secure message through [employer].  [employer] messages are answered by your nurse or doctor typically within 24 hours.  Please allow extra time on weekends and holidays.        For urgent/emergent questions after business hours, you may reach the on-call GI Fellow by contacting the [facility]  at  [phone].      In order for your refill to be processed in a timely fashion, it is your responsibility to ensure you follow the recommendations from your provider regarding your laboratory studies and follow up appointments.       How will I get the results of any tests ordered?    You will receive all of your results.  If you have signed up for [employer], any tests ordered at your visit will be available to you after your physician reviews them.  Typically this takes 1-2 weeks.  If there are urgent results that require a change in your care plan, your physician or nurse will call you to discuss the next steps.      What is [employer]?  [employer] is a secure way for you to access all of your healthcare records from the [facility].  It is a web based computer program, so you can sign on to it from any location.  It also allows you to send secure messages to your care team.  I recommend signing up for [employer] access if you have not already done so and are comfortable with using a computer.      How to I schedule a follow-up visit?  If you did not schedule a follow-up visit today, please call [phone] to schedule a follow-up office visit.        Sincerely,    Guero Arias PA-C  [facility]  Division of Gastroenterology

## 2021-08-11 NOTE — LETTER
8/11/2021         RE: Natalie Villa  [address]  [address]  [facility] [address]        Dear Colleague,    Thank you for referring your patient, Natalie Villa, to the [facility]STR[facility] [place]. Please see a copy of my visit note below.    GI CLINIC VISIT - ESTABLISHED PATIENT    CC/REFERRING PROVIDER: Referred Self  REASON FOR CONSULTATION: IBD follow up    HPI: 28 year old female with history of Crohn's vs UC on Vedolizumab (failed adalimumab, and questionable antibodies to infliximab, vedolizumab start 6/19/2015) + AZA, initially diagnosed with Crohn's in 2013. Her symptoms at the time were bloody diarrhea, left sided abdominal pain and general fatigue. She was treated with Humira for 1 year but stopped after colonoscopy showed ongoing disease. Then was treated with Remicade for about 18 months but per patient report, thinks she developed antibodies to it. She was started on Vedolizumab in June of 2015 initially at 300 mg Q8 weeks which was increased to Q6 weeks and mostly recently to Q4 weeks in May 2018 after colonoscopy in April 2018 showed ongoing colitis. She has been on AZA since diagnosis, initially at 200 mg Qday which has been titrated down to 100 mg Qday.     She had her first occurrence of C. difficile infection after the stool studies were completed after last visit in October 2018.  She completed a vancomycin taper.  Second occurrence (spontaneous without abx trigger) treated with vancomycin QID x 10 days then daily. Fecal esvin elevated to ~300, repeat cscope at that time showed Borrego 1 in descending, sigmoid and rectum.     She also continues to follow with hematology as she was diagnosed with a LLE DVT fall 2018 that was thought to be provoked and completed therapy with Eliquis.      Interval history, 5/2020 (virtual visit)  Continues on VDZ q4w + AZA 75 mg BID. Last infusion 5/8/2020 (4/10/2020). Of note, CRP was 25.   Had met with Dr. Obrien. Tried the Whole 30  "diet Mar-Apr. Stool was inconsistent at that time in the s/o increased fiber use (had loose stools up to 7 x per day). Stool normalized after liberalizing diet.     Using \"GI monitor\" brandon. Having ~4 BMs per day. Minimal urgency, mixed consistency stool, no blood. No abd pain.     Reports feeling well compared to the past. Seems to be inconsistent with recent CRP elevation.     Interval history, 9/2020 (video visit)  Saw Dr. Dodson with plans for a fecal calprotectin check in the setting of suppressive vancomycin. FC was 307.   Feeling \"pretty good\".   No abd pain. Having 3-4 BMs per day (improved). No blood. No urgency. Has not been dieting and exercising as much as before.   No joint pain or rashes.   Has been sober since March; has found this challenging.   Last Entyvio infusion was 8/28    Interval history, 12/2020 (video visit)  FC was elevated and icscope findings below, with active disease.   Having 3-4 well formed BMs per day. No blood. No urgency or abd pain. Weight has been stable.     Interval history 3/31/21 (video visit)  Feeling great! 1-2 formed stool per day, no blood in the stool. No urgency or nighttime stools. Stopped Vancomycin a couple weeks after loading dose of stelara. Continues on AZA 75 mg BID. Next injection is 5/6/21.  Had first COVID vaccine 1-2 weeks ago.     Interval hx 8/11/21  In May went through break up with boyfriend that was very sudden and unexpected. 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 She also found out she will be moving to [place] next month for work.  Now with 6-7 stools per day, small amounts and loose. Blood in than half of the stool. Cdiff negative, fecal esvin elevated to 1100. Increase in allergies, head congestion   No joint pain or rashes.     ROS: 10pt ROS performed and otherwise negative.    PERTINENT PAST MEDICAL HISTORY:  As noted " above.    PREVIOUS ABDOMINAL/GYNECOLOGIC SURGERIES:  None     PREVIOUS ENDOSCOPY:  Colonoscopy 4/23/2018 at [facility]: Mild colitis starting at the transverse colon (Borrego 1), progressing to more moderate colitis in the rectosigmoid (Borrego 2). Bx showed chronic colitis. TI and right colon were unremarkable endoscopically and bx showed mild chronic activity.     Flex sig 10/2/2018 shows Borrego 2/3, continuous, biopsies show moderate chronic active colitis.    Colonoscopy 5/2019 shows  Borrego 1 in descending, sigmoid and rectum. Bx show mild active chronic colitis with cryptitis and focal erosion.     icscope 11/2020 showed SES-CD 12  PATH  SPECIMEN(S):   A: Colon biopsies at 70 cm   B: Colon biopsy, white patch at 65 cm   C: Colon biopsies at 60 cm   D: Colon biopsies at 50 cm   E: Colon biopsies at 40 cm   F: Colon biopsies at 30 cm   G: Colon biopsies at 20 cm   H: Colon biopsies at 10 cm     FINAL DIAGNOSIS:   A. Colon, Biopsies at 70 cm:   Colonic mucosa with no granulomas, cryptitis or other histologic evidence   of active Crohn; negative for   dysplasia     B. Colon, Biopsy of White Patch at 65 cm:   Colonic mucosa with mild crypt architecture distortion, evidence of prior   injury; no granulomas, cryptitis or   other histologic evidence of active Crohn; negative for dysplasia     C. Colon, Biopsies at 60 cm:   Colonic mucosa with mild fibrosis and crypt architecture distortion,   evidence of prior injury; no granulomas,   cryptitis or other histologic evidence of active Crohn; negative for   dysplasia     D. Colon, Biopsies at 50 cm:   Chronic active colitis with occasional crypt abscess, one small granuloma,    and crypt architectural distortion;   consistent with mildly active Crohn; negative for dysplasia; report of CMV    immunohistochemistry to follow     E. Colon, Biopsies at 40 cm:   Moderately active chronic colitis with crypt abscesses and crypt   distortion; negative for dysplasia; report of   CMV  immunohistochemistry to follow     F. Colon, Biopsies at 30 cm:   Moderately active chronic colitis with crypt injuries and crypt   distortion; negative for dysplasia; report of   CMV immunohistochemistry to follow     G. Colon, Biopsies at 20 cm:   Moderately active chronic colitis with crypt injuries and crypt   distortion; negative for dysplasia; report of   CMV immunohistochemistry to follow     H. Colon, Biopsies at 10 cm:   Moderately active chronic colitis with crypt injuries and crypt   distortion; negative for dysplasia; report of   CMV immunohistochemistry to follow      PERTINENT MEDICATIONS:  - Azathioprine 75 mg BID  - Stelara every 8 weeks subQ injection (started 1/2021)   - Maria Victoria OCP   Medications reviewed with patient today, see Medication List/Assessment for details.  No other NSAID/anticoagulation reported by patient.  No other OTC/herbal/supplements reported by patient.    SOCIAL HISTORY:  - Works as   - Had been sober from ETOH x 1 year, now drinking 1-2 drinks a couple times a week  - Smokes marijuana 2-3 times/week  - No tobacco   - Sexually active, uses condoms      FAMILY HISTORY:  No colon/panc/esophageal/other GI CA, no other Valverde or other HPS-related Keo. No IBD/celiac, no other AI/liver/thyroid disease. + for asthma and acne.     PHYSICAL EXAMINATION:   General appearance  Healthy appearing adult, in no acute distress     Eyes  Sclera anicteric  Pupils round and reactive to light     Ears, nose, mouth and throat  No obvious external lesions of ears and nose  Hearing intact     Neck  Symmetric  No obvious external lesions     Respiratory  Normal respiration, no use of accessory muscles      MSK  Gait normal     Skin  No rashes or jaundice      Psychiatric  Oriented to person, place and time  Appropriate mood and affect.     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  She just recently began  having symptoms of flare and fecal esvin elevated to 1100, cdiff negative.  Uceris PO and NC sent in but coverage/cost prohibitive. We will work with our pharmacy colleagues to see what coverage is available, if not then proceed with prednisone taper. I also recommend given she has failed multiple biologics already, we move to ustekinumab every 4 weeks, as she has been on it for 6+ months and now having symptoms.       # Indeterminate colitis, on Stelara + AZA  # History of DVT  # Recurrent Cdiff infection  PLAN  --- Uceris PO + NC, if not available then prednisone  --- Move to stelara every 4 weeks   -- Continue AZA 75 mg BID     # LLE DVT:   thought to be provoked (factors include prolonged car ride from [place] to [place] when moved here this summer, strain to gastrocnemius muscle on crutches for 6 weeks, on birth control and active IBD) and was previously on Eliquis.   She discontinued oral contraceptives. Will not favor tofacitinib has a therapy given DVT history.    # IBD health maintenance   --Greatly appreciate [facility] pharmacy    Vaccinations:  -- Influenza (every year): Last given 2020  -- TdaP (every 10 years): Last given 2014  -- Pneumococcal Pneumonia (once then every 5 years): Last given 2015  -- Yearly assessment for latent Tb (verbal screening and exam, PPD or QuantiFERON-Tb testing): Indeterminate due to anergy 2017     One time confirmation of immunity or serologies:  -- Hepatitis A (serologies or immunizations): Unknown  -- Hepatitis B (serologies or immunizations): Vaccinated  -- Varicella: had chicken pox as a child  -- MMR: Unknwon   -- HPV (all aged 18-26): Up to date  -- Meningococcal meningitis (all patients at risk for meningitis): Unknown  -- Due to the immunosuppression in this patient, I would not advise administration of live vaccines such as varicella/VZV, intranasal influenza, MMR, or yellow fever vaccine (if travelling).      Bone mineral density screening   -- DEXA WNL 2015    Cancer  Screening:  Colon cancer screening: due 2022  Cervical cancer screening: Annual due to immunosuppression (completed 1/5/21)    PERTINENT STUDIES:  CBC RESULTS:   Recent Labs   Lab Test 02/14/20  1505   WBC 7.0   RBC 3.76*   HGB 11.7   HCT 36.4   MCV 97   MCH 31.1   MCHC 32.1   RDW 12.0        CRP Inflammation   Date Value Ref Range Status   07/12/2021 <2.9 0.0 - 8.0 mg/L Final   12/18/2020 <2.9 0.0 - 8.0 mg/L Final     RTC in 4 weeks     Thank you for this consultation. 36  minutes spent on the date of the encounter doing chart review, history and exam, documentation and further activities as noted above.  It was a pleasure to participate in the care of this patient; please contact us with any further questions.      Guero Arias PA-C  Division of Gastroenterology, Hepatology and Nutrition  [facility]      Again, thank you for allowing me to participate in the care of your patient.      Sincerely,    Guero Arias PA-C

## 2021-08-11 NOTE — PROGRESS NOTES
Natalie is a 31 year old who is being evaluated via a billable video visit.      How would you like to obtain your AVS? [employer]  If the video visit is dropped, the invitation should be resent by: Text to cell phone: [phone]  Will anyone else be joining your video visit? No      Video Start Time: 749 AM  Video-Visit Details    Type of service:  Video Visit    Video End Time:8:14 AM    Originating Location (pt. Location): Home    Distant Location (provider location):  [facility]STROENTEROLOG[facility]IC [place]     Platform used for Video Visit: [facility]     GI CLINIC VISIT - ESTABLISHED PATIENT    CC/REFERRING PROVIDER: Referred Self  REASON FOR CONSULTATION: IBD follow up    HPI: 28 year old female with history of Crohn's vs UC on Vedolizumab (failed adalimumab, and questionable antibodies to infliximab, vedolizumab start 6/19/2015) + AZA, initially diagnosed with Crohn's in 2013. Her symptoms at the time were bloody diarrhea, left sided abdominal pain and general fatigue. She was treated with Humira for 1 year but stopped after colonoscopy showed ongoing disease. Then was treated with Remicade for about 18 months but per patient report, thinks she developed antibodies to it. She was started on Vedolizumab in June of 2015 initially at 300 mg Q8 weeks which was increased to Q6 weeks and mostly recently to Q4 weeks in May 2018 after colonoscopy in April 2018 showed ongoing colitis. She has been on AZA since diagnosis, initially at 200 mg Qday which has been titrated down to 100 mg Qday.     She had her first occurrence of C. difficile infection after the stool studies were completed after last visit in October 2018.  She completed a vancomycin taper.  Second occurrence (spontaneous without abx trigger) treated with vancomycin QID x 10 days then daily. Fecal esvin elevated to ~300, repeat cscope at that time showed Borrego 1 in descending, sigmoid and rectum.     She also continues to follow with hematology as  "she was diagnosed with a LLE DVT fall 2018 that was thought to be provoked and completed therapy with Eliquis.      Interval history, 5/2020 (virtual visit)  Continues on VDZ q4w + AZA 75 mg BID. Last infusion 5/8/2020 (4/10/2020). Of note, CRP was 25.   Had met with Dr. Obrien. Tried the Whole 30 diet Mar-Apr. Stool was inconsistent at that time in the s/o increased fiber use (had loose stools up to 7 x per day). Stool normalized after liberalizing diet.     Using \"GI monitor\" brandon. Having ~4 BMs per day. Minimal urgency, mixed consistency stool, no blood. No abd pain.     Reports feeling well compared to the past. Seems to be inconsistent with recent CRP elevation.     Interval history, 9/2020 (video visit)  Saw Dr. Dodson with plans for a fecal calprotectin check in the setting of suppressive vancomycin. FC was 307.   Feeling \"pretty good\".   No abd pain. Having 3-4 BMs per day (improved). No blood. No urgency. Has not been dieting and exercising as much as before.   No joint pain or rashes.   Has been sober since March; has found this challenging.   Last Entyvio infusion was 8/28    Interval history, 12/2020 (video visit)  FC was elevated and icscope findings below, with active disease.   Having 3-4 well formed BMs per day. No blood. No urgency or abd pain. Weight has been stable.     Interval history 3/31/21 (video visit)  Feeling great! 1-2 formed stool per day, no blood in the stool. No urgency or nighttime stools. Stopped Vancomycin a couple weeks after loading dose of stelara. Continues on AZA 75 mg BID. Next injection is 5/6/21.  Had first COVID vaccine 1-2 weeks ago.     Interval hx 8/11/21  In May went through break up with boyfriend that was very sudden and unexpected. 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 She also found out she will be moving to [place] next month for " work.  Now with 6-7 stools per day, small amounts and loose. Blood in than half of the stool. Cdiff negative, fecal esvin elevated to 1100. Increase in allergies, head congestion   No joint pain or rashes.     ROS: 10pt ROS performed and otherwise negative.    PERTINENT PAST MEDICAL HISTORY:  As noted above.    PREVIOUS ABDOMINAL/GYNECOLOGIC SURGERIES:  None     PREVIOUS ENDOSCOPY:  Colonoscopy 4/23/2018 at [facility]: Mild colitis starting at the transverse colon (Borrego 1), progressing to more moderate colitis in the rectosigmoid (Borrego 2). Bx showed chronic colitis. TI and right colon were unremarkable endoscopically and bx showed mild chronic activity.     Flex sig 10/2/2018 shows Borrego 2/3, continuous, biopsies show moderate chronic active colitis.    Colonoscopy 5/2019 shows  Borrego 1 in descending, sigmoid and rectum. Bx show mild active chronic colitis with cryptitis and focal erosion.     icscope 11/2020 showed SES-CD 12  PATH  SPECIMEN(S):   A: Colon biopsies at 70 cm   B: Colon biopsy, white patch at 65 cm   C: Colon biopsies at 60 cm   D: Colon biopsies at 50 cm   E: Colon biopsies at 40 cm   F: Colon biopsies at 30 cm   G: Colon biopsies at 20 cm   H: Colon biopsies at 10 cm     FINAL DIAGNOSIS:   A. Colon, Biopsies at 70 cm:   Colonic mucosa with no granulomas, cryptitis or other histologic evidence   of active Crohn; negative for   dysplasia     B. Colon, Biopsy of White Patch at 65 cm:   Colonic mucosa with mild crypt architecture distortion, evidence of prior   injury; no granulomas, cryptitis or   other histologic evidence of active Crohn; negative for dysplasia     C. Colon, Biopsies at 60 cm:   Colonic mucosa with mild fibrosis and crypt architecture distortion,   evidence of prior injury; no granulomas,   cryptitis or other histologic evidence of active Crohn; negative for   dysplasia     D. Colon, Biopsies at 50 cm:   Chronic active colitis with occasional crypt abscess, one small granuloma,    and  crypt architectural distortion;   consistent with mildly active Crohn; negative for dysplasia; report of CMV    immunohistochemistry to follow     E. Colon, Biopsies at 40 cm:   Moderately active chronic colitis with crypt abscesses and crypt   distortion; negative for dysplasia; report of   CMV immunohistochemistry to follow     F. Colon, Biopsies at 30 cm:   Moderately active chronic colitis with crypt injuries and crypt   distortion; negative for dysplasia; report of   CMV immunohistochemistry to follow     G. Colon, Biopsies at 20 cm:   Moderately active chronic colitis with crypt injuries and crypt   distortion; negative for dysplasia; report of   CMV immunohistochemistry to follow     H. Colon, Biopsies at 10 cm:   Moderately active chronic colitis with crypt injuries and crypt   distortion; negative for dysplasia; report of   CMV immunohistochemistry to follow      PERTINENT MEDICATIONS:  - Azathioprine 75 mg BID  - Stelara every 8 weeks subQ injection (started 1/2021)   - Maria Victoria OCP   Medications reviewed with patient today, see Medication List/Assessment for details.  No other NSAID/anticoagulation reported by patient.  No other OTC/herbal/supplements reported by patient.    SOCIAL HISTORY:  - Works as   - Had been sober from ETOH x 1 year, now drinking 1-2 drinks a couple times a week  - Smokes marijuana 2-3 times/week  - No tobacco   - Sexually active, uses condoms      FAMILY HISTORY:  No colon/panc/esophageal/other GI CA, no other Valverde or other HPS-related Keo. No IBD/celiac, no other AI/liver/thyroid disease. + for asthma and acne.     PHYSICAL EXAMINATION:   General appearance  Healthy appearing adult, in no acute distress     Eyes  Sclera anicteric  Pupils round and reactive to light     Ears, nose, mouth and throat  No obvious external lesions of ears and nose  Hearing intact     Neck  Symmetric  No obvious external lesions     Respiratory  Normal respiration, no use of accessory muscles       MSK  Gait normal     Skin  No rashes or jaundice      Psychiatric  Oriented to person, place and time  Appropriate mood and affect.     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  She just recently began having symptoms of flare and fecal esvin elevated to 1100, cdiff negative.  Uceris PO and UT sent in but coverage/cost prohibitive. We will work with our pharmacy colleagues to see what coverage is available, if not then proceed with prednisone taper. I also recommend given she has failed multiple biologics already, we move to ustekinumab every 4 weeks, as she has been on it for 6+ months and now having symptoms.       # Indeterminate colitis, on Stelara + AZA  # History of DVT  # Recurrent Cdiff infection  PLAN  --- Uceris PO + UT, if not available then prednisone  --- Move to stelara every 4 weeks   -- Continue AZA 75 mg BID     # LLE DVT:   thought to be provoked (factors include prolonged car ride from [place] to [place] when moved here this summer, strain to gastrocnemius muscle on crutches for 6 weeks, on birth control and active IBD) and was previously on Eliquis.   She discontinued oral contraceptives. Will not favor tofacitinib has a therapy given DVT history.    # IBD health maintenance   --Greatly appreciate [facility] pharmacy    Vaccinations:  -- Influenza (every year): Last given 2020  -- TdaP (every 10 years): Last given 2014  -- Pneumococcal Pneumonia (once then every 5 years): Last given 2015  -- Yearly assessment for latent Tb (verbal screening and exam, PPD or QuantiFERON-Tb testing): Indeterminate due to anergy 2017     One time confirmation of immunity or serologies:  -- Hepatitis A (serologies or immunizations): Unknown  -- Hepatitis B (serologies or immunizations): Vaccinated  -- Varicella: had chicken pox as a child  -- MMR: Unknwon   -- HPV (all aged 18-26): Up to date  -- Meningococcal meningitis (all patients  at risk for meningitis): Unknown  -- Due to the immunosuppression in this patient, I would not advise administration of live vaccines such as varicella/VZV, intranasal influenza, MMR, or yellow fever vaccine (if travelling).      Bone mineral density screening   -- DEXA WNL 2015    Cancer Screening:  Colon cancer screening: due 2022  Cervical cancer screening: Annual due to immunosuppression (completed 1/5/21)    PERTINENT STUDIES:  CBC RESULTS:   Recent Labs   Lab Test 02/14/20  1505   WBC 7.0   RBC 3.76*   HGB 11.7   HCT 36.4   MCV 97   MCH 31.1   MCHC 32.1   RDW 12.0        CRP Inflammation   Date Value Ref Range Status   07/12/2021 <2.9 0.0 - 8.0 mg/L Final   12/18/2020 <2.9 0.0 - 8.0 mg/L Final     RTC in 4 weeks     Thank you for this consultation. 36  minutes spent on the date of the encounter doing chart review, history and exam, documentation and further activities as noted above.  It was a pleasure to participate in the care of this patient; please contact us with any further questions.      Guero Arias PA-C  Division of Gastroenterology, Hepatology and Nutrition  [facility]

## 2021-08-11 NOTE — TELEPHONE ENCOUNTER
Central Prior Authorization Team   Phone: [phone]    PA Initiation    Medication: budesonide (UCERIS) 9 MG 24 hr tablet  Insurance Company: Comment:  Rhys BROOKS [facility]   Pharmacy Filling the Rx: [facility] #[id number] [address] AT [address] [place] [address]  Filling Pharmacy Phone: [phone]  Filling Pharmacy Fax: [phone]  Start Date: 8/11/2021

## 2021-08-11 NOTE — PROGRESS NOTES
Prior Authorization Retail Medication Request    Medication/Dose:   budesonide (UCERIS) 9 MG 24 hr tablet 9 mg, EVERY MORNING Starting Mon 8/9/2021, For 30 days, THEN   9 mg, EVERY OTHER DAY Starting Wed 9/8/2021, For 14 days       ICD code (if different than what is on RX):    Previously Tried and Failed:    Rationale:      Insurance Name:    Insurance ID:        Pharmacy Information (if different than what is on RX)  Name:    Phone:     Please submit urgently as patient is in a flare

## 2021-08-12 ENCOUNTER — TELEPHONE (OUTPATIENT)
Dept: GASTROENTEROLOGY | Facility: CLINIC | Age: 31
End: 2021-08-12

## 2021-08-12 NOTE — TELEPHONE ENCOUNTER
Prior Authorization Approval    Authorization Effective Date: 8/11/2021  Authorization Expiration Date: 8/11/2022  Medication: budesonide (UCERIS) 9 MG 24 hr tablet-PA APPROVED   Approved Dose/Quantity: UP TO 1 TABLET PER DAY  Reference #:     Insurance Company: Comment:  Rhys Antwon Moseley CECILIA Amaya  [phone]  Expected CoPay:       CoPay Card Available:      Foundation Assistance Needed:    Which Pharmacy is filling the prescription (Not needed for infusion/clinic administered): [id number] [address] AT [address] [place] [address]  Pharmacy Notified: Yes- **Instructed pharmacy to notify patient when script is ready to /ship.**   Patient Notified: Yes

## 2021-08-12 NOTE — TELEPHONE ENCOUNTER
LVM to schedule 2 week follow up appt with Guero Arias. Left call center phone #. Also sent Junar.

## 2021-08-16 DIAGNOSIS — K50.10 CROHN'S DISEASE OF LARGE INTESTINE WITHOUT COMPLICATION (H): ICD-10-CM

## 2021-08-16 RX ORDER — USTEKINUMAB 90 MG/ML
INJECTION, SOLUTION SUBCUTANEOUS
Qty: 1 ML | Refills: 4 | Status: SHIPPED | OUTPATIENT
Start: 2021-08-16

## 2021-08-17 ENCOUNTER — TELEPHONE (OUTPATIENT)
Dept: GASTROENTEROLOGY | Facility: CLINIC | Age: 31
End: 2021-08-17

## 2021-08-17 NOTE — TELEPHONE ENCOUNTER
PA Initiation    Medication: STELARA MONTHLY   Insurance Company: Other (see comments)Comment:  [facility] Prescription Drug Authorization Form  Pharmacy Filling the Rx: [facility] - [place], [place] - [facility] [address]  Filling Pharmacy Phone:    Filling Pharmacy Fax:    Start Date: 8/17/2021    SUMMER ANNA (Key: [id number])

## 2021-08-19 NOTE — TELEPHONE ENCOUNTER
Prior Authorization Approval    Authorization Effective Date: 8/18/2021  Authorization Expiration Date:  Until further notice   Medication: STELARA MONTHLY - Apprvoved   Approved Dose/Quantity: 1 for 28 days   Reference #:     Insurance Company: Other (see comments)Comment:  [facility] Prescription Drug Authorization Form  Expected CoPay:       CoPay Card Available:      Foundation Assistance Needed:    Which Pharmacy is filling the prescription (Not needed for infusion/clinic administered): [facility] [place] [facility] [address] [place] [address]  Pharmacy Notified: Yes  Patient Notified: Yes

## 2021-08-25 ENCOUNTER — VIRTUAL VISIT (OUTPATIENT)
Dept: GASTROENTEROLOGY | Facility: CLINIC | Age: 31
End: 2021-08-25
Payer: COMMERCIAL

## 2021-08-25 VITALS — BODY MASS INDEX: 27.6 KG/M2 | WEIGHT: 171 LBS

## 2021-08-25 DIAGNOSIS — K50.10 CROHN'S DISEASE OF LARGE INTESTINE WITHOUT COMPLICATION (H): ICD-10-CM

## 2021-08-25 PROCEDURE — 99213 OFFICE O/P EST LOW 20 MIN: CPT | Mod: 95 | Performed by: PHYSICIAN ASSISTANT

## 2021-08-25 NOTE — LETTER
8/25/2021         RE: Natalie Villa  [address]  [address]  [facility] [address]        Dear Colleague,    Thank you for referring your patient, Natalie Villa, to the [facility] [place]. Please see a copy of my visit note below.    GI CLINIC VISIT - ESTABLISHED PATIENT    CC/REFERRING PROVIDER: Referred Self  REASON FOR CONSULTATION: IBD follow up    HPI: 28 year old female with history of Crohn's vs UC on Vedolizumab (failed adalimumab, and questionable antibodies to infliximab, vedolizumab start 6/19/2015) + AZA, initially diagnosed with Crohn's in 2013. Her symptoms at the time were bloody diarrhea, left sided abdominal pain and general fatigue. She was treated with Humira for 1 year but stopped after colonoscopy showed ongoing disease. Then was treated with Remicade for about 18 months but per patient report, thinks she developed antibodies to it. She was started on Vedolizumab in June of 2015 initially at 300 mg Q8 weeks which was increased to Q6 weeks and mostly recently to Q4 weeks in May 2018 after colonoscopy in April 2018 showed ongoing colitis. She has been on AZA since diagnosis, initially at 200 mg Qday which has been titrated down to 100 mg Qday.     She had her first occurrence of C. difficile infection after the stool studies were completed after last visit in October 2018.  She completed a vancomycin taper.  Second occurrence (spontaneous without abx trigger) treated with vancomycin QID x 10 days then daily. Fecal esvin elevated to ~300, repeat cscope at that time showed Borrego 1 in descending, sigmoid and rectum.     She also continues to follow with hematology as she was diagnosed with a LLE DVT fall 2018 that was thought to be provoked and completed therapy with Eliquis.      Interval history, 5/2020 (virtual visit)  Continues on VDZ q4w + AZA 75 mg BID. Last infusion 5/8/2020 (4/10/2020). Of note, CRP was 25.   Had met with Dr. Obrien. Tried the Whole 30  "diet Mar-Apr. Stool was inconsistent at that time in the s/o increased fiber use (had loose stools up to 7 x per day). Stool normalized after liberalizing diet.     Using \"GI monitor\" brandon. Having ~4 BMs per day. Minimal urgency, mixed consistency stool, no blood. No abd pain.     Reports feeling well compared to the past. Seems to be inconsistent with recent CRP elevation.     Interval history, 9/2020 (video visit)  Saw Dr. Dodson with plans for a fecal calprotectin check in the setting of suppressive vancomycin. FC was 307.   Feeling \"pretty good\".   No abd pain. Having 3-4 BMs per day (improved). No blood. No urgency. Has not been dieting and exercising as much as before.   No joint pain or rashes.   Has been sober since March; has found this challenging.   Last Entyvio infusion was 8/28    Interval history, 12/2020 (video visit)  FC was elevated and icscope findings below, with active disease.   Having 3-4 well formed BMs per day. No blood. No urgency or abd pain. Weight has been stable.     Interval history 3/31/21 (video visit)  Feeling great! 1-2 formed stool per day, no blood in the stool. No urgency or nighttime stools. Stopped Vancomycin a couple weeks after loading dose of stelara. Continues on AZA 75 mg BID. Next injection is 5/6/21.  Had first COVID vaccine 1-2 weeks ago.     Interval hx 8/11/21  In May went through break up with boyfriend that was very sudden and unexpected. 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 She also found out she will be moving to [place] next month for work.  Now with 6-7 stools per day, small amounts and loose. Blood in than half of the stool. Cdiff negative, fecal esvin elevated to 1100. Increase in allergies, head congestion   No joint pain or rashes.     Interval hx 8/25/21  Feeing on Uceris 9mg daily. Stelara q4 wk was approved. This week she is " at 8 week lashon, so will moving into q4 weeks this next cycle.   She is having 4-5 stools per day, no blood in the stool. Often first stools are looser then become more formed. No nighttime stools. Some urgency, but improved. No fecal incontinence. No EIM at this time.     She has reached out to her previous IBD provider and will be reestablishing care.     ROS: 10pt ROS performed and otherwise negative.    PERTINENT PAST MEDICAL HISTORY:  As noted above.    PREVIOUS ABDOMINAL/GYNECOLOGIC SURGERIES:  None     PREVIOUS ENDOSCOPY:  Colonoscopy 4/23/2018 at [facility]: Mild colitis starting at the transverse colon (Borrego 1), progressing to more moderate colitis in the rectosigmoid (Borrgeo 2). Bx showed chronic colitis. TI and right colon were unremarkable endoscopically and bx showed mild chronic activity.     Flex sig 10/2/2018 shows Borrego 2/3, continuous, biopsies show moderate chronic active colitis.    Colonoscopy 5/2019 shows  Borrego 1 in descending, sigmoid and rectum. Bx show mild active chronic colitis with cryptitis and focal erosion.     icscope 11/2020 showed SES-CD 12  PATH  SPECIMEN(S):   A: Colon biopsies at 70 cm   B: Colon biopsy, white patch at 65 cm   C: Colon biopsies at 60 cm   D: Colon biopsies at 50 cm   E: Colon biopsies at 40 cm   F: Colon biopsies at 30 cm   G: Colon biopsies at 20 cm   H: Colon biopsies at 10 cm     FINAL DIAGNOSIS:   A. Colon, Biopsies at 70 cm:   Colonic mucosa with no granulomas, cryptitis or other histologic evidence   of active Crohn; negative for   dysplasia     B. Colon, Biopsy of White Patch at 65 cm:   Colonic mucosa with mild crypt architecture distortion, evidence of prior   injury; no granulomas, cryptitis or   other histologic evidence of active Crohn; negative for dysplasia     C. Colon, Biopsies at 60 cm:   Colonic mucosa with mild fibrosis and crypt architecture distortion,   evidence of prior injury; no granulomas,   cryptitis or other histologic evidence of active  Crohn; negative for   dysplasia     D. Colon, Biopsies at 50 cm:   Chronic active colitis with occasional crypt abscess, one small granuloma,    and crypt architectural distortion;   consistent with mildly active Crohn; negative for dysplasia; report of CMV    immunohistochemistry to follow     E. Colon, Biopsies at 40 cm:   Moderately active chronic colitis with crypt abscesses and crypt   distortion; negative for dysplasia; report of   CMV immunohistochemistry to follow     F. Colon, Biopsies at 30 cm:   Moderately active chronic colitis with crypt injuries and crypt   distortion; negative for dysplasia; report of   CMV immunohistochemistry to follow     G. Colon, Biopsies at 20 cm:   Moderately active chronic colitis with crypt injuries and crypt   distortion; negative for dysplasia; report of   CMV immunohistochemistry to follow     H. Colon, Biopsies at 10 cm:   Moderately active chronic colitis with crypt injuries and crypt   distortion; negative for dysplasia; report of   CMV immunohistochemistry to follow      PERTINENT MEDICATIONS:  - Azathioprine 75 mg BID  - Stelara every 8 weeks subQ injection (started 1/2021)   - Maria Victoria OCP   Medications reviewed with patient today, see Medication List/Assessment for details.  No other NSAID/anticoagulation reported by patient.  No other OTC/herbal/supplements reported by patient.    SOCIAL HISTORY:  - Works as   - Had been sober from ETOH x 1 year, now drinking 1-2 drinks a couple times a week  - Smokes marijuana 2-3 times/week  - No tobacco   - Sexually active, uses condoms      FAMILY HISTORY:  No colon/panc/esophageal/other GI CA, no other Valverde or other HPS-related Keo. No IBD/celiac, no other AI/liver/thyroid disease. + for asthma and acne.     PHYSICAL EXAMINATION:   General appearance  Healthy appearing adult, in no acute distress     Eyes  Sclera anicteric  Pupils round and reactive to light     Ears, nose, mouth and throat  No obvious external lesions  of ears and nose  Hearing intact     Neck  Symmetric  No obvious external lesions     Respiratory  Normal respiration, no use of accessory muscles      MSK  Gait normal     Skin  No rashes or jaundice      Psychiatric  Oriented to person, place and time  Appropriate mood and affect.     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  She just recently began having symptoms of flare and fecal esvin elevated to 1100, cdiff negative.  Uceris PO started with improvement in symptoms while awaiting stelara q4 week interval to work. Recommend repeat endoscopic assessment after 6mo on q4 week interval, sooner if there is no improvement to determine next steps in therapy.     # Indeterminate colitis, on Stelara + AZA  # History of DVT  # Recurrent Cdiff infection  PLAN  --- Uceris PO   -- Move to [facility] every 4 weeks   -- Continue AZA 75 mg BID   -- Thiopurine level     # LLE DVT:   thought to be provoked (factors include prolonged car ride from [place] to [place] when moved here this summer, strain to gastrocnemius muscle on crutches for 6 weeks, on birth control and active IBD) and was previously on Eliquis.   She discontinued oral contraceptives. Will not favor tofacitinib has a therapy given DVT history.    # IBD health maintenance   --Greatly appreciate [facility] pharmacy    Vaccinations:  -- Influenza (every year): Last given 2020  -- TdaP (every 10 years): Last given 2014  -- Pneumococcal Pneumonia (once then every 5 years): Last given 2015  -- Yearly assessment for latent Tb (verbal screening and exam, PPD or QuantiFERON-Tb testing): Indeterminate due to anergy 2017     One time confirmation of immunity or serologies:  -- Hepatitis A (serologies or immunizations): Unknown  -- Hepatitis B (serologies or immunizations): Vaccinated  -- Varicella: had chicken pox as a child  -- MMR: [place]   -- HPV (all aged 18-26): Up to date  -- Meningococcal  meningitis (all patients at risk for meningitis): Unknown  -- Due to the immunosuppression in this patient, I would not advise administration of live vaccines such as varicella/VZV, intranasal influenza, MMR, or yellow fever vaccine (if travelling).      Bone mineral density screening   -- DEXA WNL 2015    Cancer Screening:  Colon cancer screening: due 2022  Cervical cancer screening: Annual due to immunosuppression (completed 1/5/21)    PERTINENT STUDIES:  CBC RESULTS:   Recent Labs   Lab Test 02/14/20  1505   WBC 7.0   RBC 3.76*   HGB 11.7   HCT 36.4   MCV 97   MCH 31.1   MCHC 32.1   RDW 12.0        CRP Inflammation   Date Value Ref Range Status   07/12/2021 <2.9 0.0 - 8.0 mg/L Final   12/18/2020 <2.9 0.0 - 8.0 mg/L Final     RTC in 4 weeks     Thank you for this consultation. 22  minutes spent on the date of the encounter doing chart review, history and exam, documentation and further activities as noted above.  It was a pleasure to participate in the care of this patient; please contact us with any further questions.      Guero Arias PA-C  Division of Gastroenterology, Hepatology and Nutrition  [facility]        Again, thank you for allowing me to participate in the care of your patient.      Sincerely,    Guero Arias PA-C

## 2021-08-25 NOTE — PATIENT INSTRUCTIONS
It was a pleasure taking care of you today.  I've included a brief summary of our discussion and care plan from today's visit below.  Please review this information with your primary care provider.  ______________________________________________________________________    My recommendations are summarized as follows:    -- Start stelara every 4 weeks and azthioprine 75 mg twice daily   -- Continue uceris, let us know if symptoms change on every other day and or if you need refill.  -- Labs for azathioprine level   The order for this is in, can be completed at our lab. To schedule lab appointment here, use my chart or call [phone].   -- Next endoscopic assessment: 6 months  -- Patient with IBD we recommend supplementation vitamin D 1000 units daily and calcium 500 mg twice daily.  -- Vaccines/immunizations to be updated: all up to date   -- Yearly Dermatology visit for skin check while on immunosuppressive therapy. Can call [phone] to schedule.  -- Yearly pap smear while on immunosuppressive therapy  -- No NSAIDs (ibuprofen, or anything containing ibuprofen)     For additional resources about inflammatory bowel disease visit [email or web address]    Return to GI Clinic in 4 weeks to review your progress.    ______________________________________________________________________    Who do I call with any questions after my visit?  Please be in touch if there are any further questions that arise following today's visit.  There are multiple ways to contact your gastroenterology care team.        During business hours, you may reach a Gastroenterology nurse at [phone], option 3.       To schedule or reschedule an appointment, please call [phone].       You can always send a secure message through [employer].  [employer] messages are answered by your nurse or doctor typically within 24 hours.  Please allow extra time on weekends and holidays.        For urgent/emergent questions after  business hours, you may reach the on-call GI Fellow by contacting the [facility]  at [phone].      In order for your refill to be processed in a timely fashion, it is your responsibility to ensure you follow the recommendations from your provider regarding your laboratory studies and follow up appointments.       How will I get the results of any tests ordered?    You will receive all of your results.  If you have signed up for [employer], any tests ordered at your visit will be available to you after your physician reviews them.  Typically this takes 1-2 weeks.  If there are urgent results that require a change in your care plan, your physician or nurse will call you to discuss the next steps.      What is [employer]?  [employer] is a secure way for you to access all of your healthcare records from the [facility].  It is a web based computer program, so you can sign on to it from any location.  It also allows you to send secure messages to your care team.  I recommend signing up for [employer] access if you have not already done so and are comfortable with using a computer.      How to I schedule a follow-up visit?  If you did not schedule a follow-up visit today, please call [phone] to schedule a follow-up office visit.        Sincerely,    Guero Arias PA-C  [facility]  Division of Gastroenterology

## 2021-08-25 NOTE — PROGRESS NOTES
Natalie Villa is a 31 year old old who is being evaluated via a billable video visit.      How would you like to obtain your AVS? MyChart  If the video visit is dropped, the invitation should be resent by: Text to cell phone: [phone]  Will anyone else be joining your video visit? No      MIKAELA Hardwick  [facility]    Video start 0820  Video stop 0840    GI CLINIC VISIT - ESTABLISHED PATIENT    CC/REFERRING PROVIDER: Referred Self  REASON FOR CONSULTATION: IBD follow up    HPI: 28 year old female with history of Crohn's vs UC on Vedolizumab (failed adalimumab, and questionable antibodies to infliximab, vedolizumab start 6/19/2015) + AZA, initially diagnosed with Crohn's in 2013. Her symptoms at the time were bloody diarrhea, left sided abdominal pain and general fatigue. She was treated with Humira for 1 year but stopped after colonoscopy showed ongoing disease. Then was treated with Remicade for about 18 months but per patient report, thinks she developed antibodies to it. She was started on Vedolizumab in June of 2015 initially at 300 mg Q8 weeks which was increased to Q6 weeks and mostly recently to Q4 weeks in May 2018 after colonoscopy in April 2018 showed ongoing colitis. She has been on AZA since diagnosis, initially at 200 mg Qday which has been titrated down to 100 mg Qday.     She had her first occurrence of C. difficile infection after the stool studies were completed after last visit in October 2018.  She completed a vancomycin taper.  Second occurrence (spontaneous without abx trigger) treated with vancomycin QID x 10 days then daily. Fecal esvin elevated to ~300, repeat cscope at that time showed Borrego 1 in descending, sigmoid and rectum.     She also continues to follow with hematology as she was diagnosed with a LLE DVT fall 2018 that was thought to be provoked and completed therapy with Eliquis.      Interval history, 5/2020 (virtual visit)  Continues on VDZ q4w + AZA 75 mg BID. Last  "infusion 5/8/2020 (4/10/2020). Of note, CRP was 25.   Had met with Dr. Obrien. Tried the Whole 30 diet Mar-Apr. Stool was inconsistent at that time in the s/o increased fiber use (had loose stools up to 7 x per day). Stool normalized after liberalizing diet.     Using \"GI monitor\" brandon. Having ~4 BMs per day. Minimal urgency, mixed consistency stool, no blood. No abd pain.     Reports feeling well compared to the past. Seems to be inconsistent with recent CRP elevation.     Interval history, 9/2020 (video visit)  Saw Dr. Dodson with plans for a fecal calprotectin check in the setting of suppressive vancomycin. FC was 307.   Feeling \"pretty good\".   No abd pain. Having 3-4 BMs per day (improved). No blood. No urgency. Has not been dieting and exercising as much as before.   No joint pain or rashes.   Has been sober since March; has found this challenging.   Last Entyvio infusion was 8/28    Interval history, 12/2020 (video visit)  FC was elevated and icscope findings below, with active disease.   Having 3-4 well formed BMs per day. No blood. No urgency or abd pain. Weight has been stable.     Interval history 3/31/21 (video visit)  Feeling great! 1-2 formed stool per day, no blood in the stool. No urgency or nighttime stools. Stopped Vancomycin a couple weeks after loading dose of stelara. Continues on AZA 75 mg BID. Next injection is 5/6/21.  Had first COVID vaccine 1-2 weeks ago.     Interval hx 8/11/21  In May went through break up with boyfriend that was very sudden and unexpected. 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 She also found out she will be moving to [place] next month for work.  Now with 6-7 stools per day, small amounts and loose. Blood in than half of the stool. Cdiff negative, fecal esvin elevated to 1100. Increase in allergies, head congestion   No joint pain or rashes. "     Interval hx 8/25/21  Feeing on Uceris 9mg daily. Stelara q4 wk was approved. This week she is at 8 week lashon, so will moving into q4 weeks this next cycle.   She is having 4-5 stools per day, no blood in the stool. Often first stools are looser then become more formed. No nighttime stools. Some urgency, but improved. No fecal incontinence. No EIM at this time.     She has reached out to her previous IBD provider and will be reestablishing care.     ROS: 10pt ROS performed and otherwise negative.    PERTINENT PAST MEDICAL HISTORY:  As noted above.    PREVIOUS ABDOMINAL/GYNECOLOGIC SURGERIES:  None     PREVIOUS ENDOSCOPY:  Colonoscopy 4/23/2018 at [facility]: Mild colitis starting at the transverse colon (Borrego 1), progressing to more moderate colitis in the rectosigmoid (Borrego 2). Bx showed chronic colitis. TI and right colon were unremarkable endoscopically and bx showed mild chronic activity.     Flex sig 10/2/2018 shows Borrego 2/3, continuous, biopsies show moderate chronic active colitis.    Colonoscopy 5/2019 shows  Borrego 1 in descending, sigmoid and rectum. Bx show mild active chronic colitis with cryptitis and focal erosion.     icscope 11/2020 showed SES-CD 12  PATH  SPECIMEN(S):   A: Colon biopsies at 70 cm   B: Colon biopsy, white patch at 65 cm   C: Colon biopsies at 60 cm   D: Colon biopsies at 50 cm   E: Colon biopsies at 40 cm   F: Colon biopsies at 30 cm   G: Colon biopsies at 20 cm   H: Colon biopsies at 10 cm     FINAL DIAGNOSIS:   A. Colon, Biopsies at 70 cm:   Colonic mucosa with no granulomas, cryptitis or other histologic evidence   of active Crohn; negative for   dysplasia     B. Colon, Biopsy of White Patch at 65 cm:   Colonic mucosa with mild crypt architecture distortion, evidence of prior   injury; no granulomas, cryptitis or   other histologic evidence of active Crohn; negative for dysplasia     C. Colon, Biopsies at 60 cm:   Colonic mucosa with mild fibrosis and crypt architecture  distortion,   evidence of prior injury; no granulomas,   cryptitis or other histologic evidence of active Crohn; negative for   dysplasia     D. Colon, Biopsies at 50 cm:   Chronic active colitis with occasional crypt abscess, one small granuloma,    and crypt architectural distortion;   consistent with mildly active Crohn; negative for dysplasia; report of CMV    immunohistochemistry to follow     E. Colon, Biopsies at 40 cm:   Moderately active chronic colitis with crypt abscesses and crypt   distortion; negative for dysplasia; report of   CMV immunohistochemistry to follow     F. Colon, Biopsies at 30 cm:   Moderately active chronic colitis with crypt injuries and crypt   distortion; negative for dysplasia; report of   CMV immunohistochemistry to follow     G. Colon, Biopsies at 20 cm:   Moderately active chronic colitis with crypt injuries and crypt   distortion; negative for dysplasia; report of   CMV immunohistochemistry to follow     H. Colon, Biopsies at 10 cm:   Moderately active chronic colitis with crypt injuries and crypt   distortion; negative for dysplasia; report of   CMV immunohistochemistry to follow      PERTINENT MEDICATIONS:  - Azathioprine 75 mg BID  - Stelara every 8 weeks subQ injection (started 1/2021)   [facility]   Medications reviewed with patient today, see Medication List/Assessment for details.  No other NSAID/anticoagulation reported by patient.  No other OTC/herbal/supplements reported by patient.    SOCIAL HISTORY:  - Works as   - Had been sober from ETOH x 1 year, now drinking 1-2 drinks a couple times a week  - Smokes marijuana 2-3 times/week  - No tobacco   - Sexually active, uses condoms      FAMILY HISTORY:  No colon/panc/esophageal/other GI CA, no other Val[place]de or other HPS-related Keo. No IBD/celiac, no other AI/liver/thyroid disease. + for asthma and acne.     PHYSICAL EXAMINATION:   General appearance  Healthy appearing adult, in no acute distress     Eyes  Sclera  anicteric  Pupils round and reactive to light     Ears, nose, mouth and throat  No obvious external lesions of ears and nose  Hearing intact     Neck  Symmetric  No obvious external lesions     Respiratory  Normal respiration, no use of accessory muscles      MSK  Gait normal     Skin  No rashes or jaundice      Psychiatric  Oriented to person, place and time  Appropriate mood and affect.     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  She just recently began having symptoms of flare and fecal esvin elevated to 1100, cdiff negative.  Uceris PO started with improvement in symptoms while awaiting stelara q4 week interval to work. Recommend repeat endoscopic assessment after 6mo on q4 week interval, sooner if there is no improvement to determine next steps in therapy.     # Indeterminate colitis, on Stelara + AZA  # History of DVT  # Recurrent Cdiff infection  PLAN  --- Uceris PO   -- Move to [facility] every 4 weeks   -- Continue AZA 75 mg BID   -- Thiopurine level     # LLE DVT:   thought to be provoked (factors include prolonged car ride from [place] to [place] when moved here this summer, strain to gastrocnemius muscle on crutches for 6 weeks, on birth control and active IBD) and was previously on Eliquis.   She discontinued oral contraceptives. Will not favor tofacitinib has a therapy given DVT history.    # IBD health maintenance   --Greatly appreciate [facility] pharmacy    Vaccinations:  -- Influenza (every year): Last given 2020  -- TdaP (every 10 years): Last given 2014  -- Pneumococcal Pneumonia (once then every 5 years): Last given 2015  -- Yearly assessment for latent Tb (verbal screening and exam, PPD or QuantiFERON-Tb testing): Indeterminate due to anergy 2017     One time confirmation of immunity or serologies:  -- Hepatitis A (serologies or immunizations): Unknown  -- Hepatitis B (serologies or immunizations): Vaccinated  --  Varicella: had chicken pox as a child  -- MMR: [place]   -- HPV (all aged 18-26): Up to date  -- Meningococcal meningitis (all patients at risk for meningitis): Unknown  -- Due to the immunosuppression in this patient, I would not advise administration of live vaccines such as varicella/VZV, intranasal influenza, MMR, or yellow fever vaccine (if travelling).      Bone mineral density screening   -- DEXA WNL 2015    Cancer Screening:  Colon cancer screening: due 2022  Cervical cancer screening: Annual due to immunosuppression (completed 1/5/21)    PERTINENT STUDIES:  CBC RESULTS:   Recent Labs   Lab Test 02/14/20  1505   WBC 7.0   RBC 3.76*   HGB 11.7   HCT 36.4   MCV 97   MCH 31.1   MCHC 32.1   RDW 12.0        CRP Inflammation   Date Value Ref Range Status   07/12/2021 <2.9 0.0 - 8.0 mg/L Final   12/18/2020 <2.9 0.0 - 8.0 mg/L Final     RTC in 4 weeks     Thank you for this consultation. 22  minutes spent on the date of the encounter doing chart review, history and exam, documentation and further activities as noted above.  It was a pleasure to participate in the care of this patient; please contact us with any further questions.      Guero Arias PA-C  Division of Gastroenterology, Hepatology and Nutrition  [facility]

## 2021-08-31 ENCOUNTER — LAB (OUTPATIENT)
Dept: LAB | Facility: CLINIC | Age: 31
End: 2021-08-31
Attending: PHYSICIAN ASSISTANT
Payer: COMMERCIAL

## 2021-08-31 DIAGNOSIS — K50.10 CROHN'S DISEASE OF LARGE INTESTINE WITHOUT COMPLICATION (H): ICD-10-CM

## 2021-08-31 PROCEDURE — 80299 QUANTITATIVE ASSAY DRUG: CPT | Mod: 90 | Performed by: PATHOLOGY

## 2021-08-31 PROCEDURE — 36415 COLL VENOUS BLD VENIPUNCTURE: CPT | Performed by: PATHOLOGY

## 2021-09-02 LAB
6-TGN ENTSUB RBC: 396 PMOL/8X10(8)RBC (ref 235–450)
6MMP ENTSUB RBC: 1040 PMOL/8X10(8)RBC

## 2021-09-07 ENCOUNTER — IMMUNIZATION (OUTPATIENT)
Dept: NURSING | Facility: CLINIC | Age: 31
End: 2021-09-07
Payer: COMMERCIAL

## 2021-09-07 PROCEDURE — 91300 PR COVID VAC PFIZER DIL RECON 30 MCG/0.3 ML IM: CPT

## 2021-09-07 PROCEDURE — 0003A PR COVID VAC PFIZER DIL RECON 30 MCG/0.3 ML IM: CPT

## 2021-09-15 ENCOUNTER — VIRTUAL VISIT (OUTPATIENT)
Dept: GASTROENTEROLOGY | Facility: CLINIC | Age: 31
End: 2021-09-15
Payer: COMMERCIAL

## 2021-09-15 VITALS — WEIGHT: 176.5 LBS | BODY MASS INDEX: 28.49 KG/M2

## 2021-09-15 DIAGNOSIS — K50.10 CROHN'S DISEASE OF LARGE INTESTINE WITHOUT COMPLICATION (H): ICD-10-CM

## 2021-09-15 PROCEDURE — 99215 OFFICE O/P EST HI 40 MIN: CPT | Mod: 95 | Performed by: PHYSICIAN ASSISTANT

## 2021-09-15 RX ORDER — BUDESONIDE 9 MG/1
TABLET, FILM COATED, EXTENDED RELEASE ORAL
Qty: 37 TABLET | Refills: 0 | Status: SHIPPED | OUTPATIENT
Start: 2021-09-15 | End: 2021-10-29

## 2021-09-15 NOTE — PATIENT INSTRUCTIONS
It was a pleasure taking care of you today.  I've included a brief summary of our discussion and care plan from today's visit below.  Please review this information with your primary care provider.  ______________________________________________________________________    My recommendations are summarized as follows:    -- Continue stelara every 4 weeks and azthioprine 75 mg twice daily   -- Continue uceris, let us know if symptoms change on every other day and or if you need refill.  -- Next endoscopic assessment: 3-6 months  -- Patient with IBD we recommend supplementation vitamin D 1000 units daily and calcium 500 mg twice daily.  -- Vaccines/immunizations to be updated: all up to date   -- Yearly Dermatology visit for skin check while on immunosuppressive therapy. Can call [phone] to schedule.  -- Yearly pap smear while on immunosuppressive therapy  -- No NSAIDs (ibuprofen, or anything containing ibuprofen)     For additional resources about inflammatory bowel disease visit [email or web address]    Return to GI Clinic as needed to review your progress.    ______________________________________________________________________    Who do I call with any questions after my visit?  Please be in touch if there are any further questions that arise following today's visit.  There are multiple ways to contact your gastroenterology care team.        During business hours, you may reach a Gastroenterology nurse at [phone], option 3.       To schedule or reschedule an appointment, please call [phone].       You can always send a secure message through [employer].  [employer] messages are answered by your nurse or doctor typically within 24 hours.  Please allow extra time on weekends and holidays.        For urgent/emergent questions after business hours, you may reach the on-call GI Fellow by contacting the [facility] at [phone].      In order for your refill to be  processed in a timely fashion, it is your responsibility to ensure you follow the recommendations from your provider regarding your laboratory studies and follow up appointments.       How will I get the results of any tests ordered?    You will receive all of your results.  If you have signed up for [employer], any tests ordered at your visit will be available to you after your physician reviews them.  Typically this takes 1-2 weeks.  If there are urgent results that require a change in your care plan, your physician or nurse will call you to discuss the next steps.      What is [employer]?  [employer] is a secure way for you to access all of your healthcare records from the [facility].  It is a web based computer program, so you can sign on to it from any location.  It also allows you to send secure messages to your care team.  I recommend signing up for [employer] access if you have not already done so and are comfortable with using a computer.      How to I schedule a follow-up visit?  If you did not schedule a follow-up visit today, please call [phone] to schedule a follow-up office visit.        Sincerely,    Guero Arias PA-C  [facility]  Division of Gastroenterology

## 2021-09-15 NOTE — PROGRESS NOTES
Natalie is a 31 year old who is being evaluated via a billable video visit.      How would you like to obtain your AVS? MyChart  If the video visit is dropped, the invitation should be resent by: Text to cell phone: [phone]  Will anyone else be joining your video visit? No      Video Start Time: 0827 am  Video-Visit Details    Type of service:  Video Visit    Video End Time:8:52 AM    Originating Location (pt. Location): Home    Distant Location (provider location):  [facility] [place]     Platform used for Video Visit: Oilex      During this virtual visit the patient is located in [place], patient verifies this as the location during the entirety of this visit.     Margareth Sánchez, EMT  Surgery Clinic    GI CLINIC VISIT - ESTABLISHED PATIENT    CC/REFERRING PROVIDER: Referred Self  REASON FOR CONSULTATION: IBD follow up    HPI: 28 year old female with history of Crohn's vs UC on Vedolizumab (failed adalimumab, and questionable antibodies to infliximab, vedolizumab start 6/19/2015) + AZA, initially diagnosed with Crohn's in 2013. Her symptoms at the time were bloody diarrhea, left sided abdominal pain and general fatigue. She was treated with Humira for 1 year but stopped after colonoscopy showed ongoing disease. Then was treated with Remicade for about 18 months but per patient report, thinks she developed antibodies to it. She was started on Vedolizumab in June of 2015 initially at 300 mg Q8 weeks which was increased to Q6 weeks and mostly recently to Q4 weeks in May 2018 after colonoscopy in April 2018 showed ongoing colitis. She has been on AZA since diagnosis, initially at 200 mg Qday which has been titrated down to 100 mg Qday.     She had her first occurrence of C. difficile infection after the stool studies were completed after last visit in October 2018.  She completed a vancomycin taper.  Second occurrence (spontaneous without abx trigger) treated with vancomycin QID x 10  "days then daily. Fecal esvin elevated to ~300, repeat cscope at that time showed Borrego 1 in descending, sigmoid and rectum.     She also continues to follow with hematology as she was diagnosed with a LLE DVT fall 2018 that was thought to be provoked and completed therapy with Eliquis.      Interval history, 5/2020 (virtual visit)  Continues on VDZ q4w + AZA 75 mg BID. Last infusion 5/8/2020 (4/10/2020). Of note, CRP was 25.   Had met with Dr. Obrien. Tried the Whole 30 diet Mar-Apr. Stool was inconsistent at that time in the s/o increased fiber use (had loose stools up to 7 x per day). Stool normalized after liberalizing diet.     Using \"GI monitor\" brandon. Having ~4 BMs per day. Minimal urgency, mixed consistency stool, no blood. No abd pain.     Reports feeling well compared to the past. Seems to be inconsistent with recent CRP elevation.     Interval history, 9/2020 (video visit)  Saw Dr. Dodson with plans for a fecal calprotectin check in the setting of suppressive vancomycin. FC was 307.   Feeling \"pretty good\".   No abd pain. Having 3-4 BMs per day (improved). No blood. No urgency. Has not been dieting and exercising as much as before.   No joint pain or rashes.   Has been sober since March; has found this challenging.   Last Entyvio infusion was 8/28    Interval history, 12/2020 (video visit)  FC was elevated and icscope findings below, with active disease.   Having 3-4 well formed BMs per day. No blood. No urgency or abd pain. Weight has been stable.     Interval history 3/31/21 (video visit)  Feeling great! 1-2 formed stool per day, no blood in the stool. No urgency or nighttime stools. Stopped Vancomycin a couple weeks after loading dose of stelara. Continues on AZA 75 mg BID. Next injection is 5/6/21.  Had first COVID vaccine 1-2 weeks ago.     Interval hx 8/11/21  In May went through break up with boyfriend that was very sudden and unexpected. 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 She also found out she will be moving to [place] next month for work.  Now with 6-7 stools per day, small amounts and loose. Blood in than half of the stool. Cdiff negative, fecal esvin elevated to 1100. Increase in allergies, head congestion   No joint pain or rashes.     Interval hx 8/25/21  Feeing on Uceris 9mg daily. Stelara q4 wk was approved. This week she is at 8 week lashon, so will moving into q4 weeks this next cycle.   She is having 4-5 stools per day, no blood in the stool. Often first stools are looser then become more formed. No nighttime stools. Some urgency, but improved. No fecal incontinence. No EIM at this time.     She has reached out to her previous IBD provider and will be reestablishing care.     Interval hx 9/15/21   Frequency has decreased to 3 times per day, no blood in the stool. No abdominal pain, improvement in gas pain. No nighttime stools, some urgency but no fecal incontinence.     ROS: 10pt ROS performed and otherwise negative.    PERTINENT PAST MEDICAL HISTORY:  As noted above.    PREVIOUS ABDOMINAL/GYNECOLOGIC SURGERIES:  None     PREVIOUS ENDOSCOPY:  Colonoscopy 4/23/2018 at [facility]: Mild colitis starting at the transverse colon (Borrego 1), progressing to more moderate colitis in the rectosigmoid (Borrego 2). Bx showed chronic colitis. TI and right colon were unremarkable endoscopically and bx showed mild chronic activity.     Flex sig 10/2/2018 shows Borrego 2/3, continuous, biopsies show moderate chronic active colitis.    Colonoscopy 5/2019 shows  Borrego 1 in descending, sigmoid and rectum. Bx show mild active chronic colitis with cryptitis and focal erosion.     icscope 11/2020 showed SES-CD 12  PATH  SPECIMEN(S):   A: Colon biopsies at 70 cm   B: Colon biopsy, white patch at 65 cm   C: Colon biopsies at 60 cm   D: Colon biopsies at 50 cm   E: Colon biopsies at 40 cm   F: Colon  biopsies at 30 cm   G: Colon biopsies at 20 cm   H: Colon biopsies at 10 cm Contnu  FINAL DIAGNOSIS:   A. Colon, Biopsies at 70 cm:   Colonic mucosa with no granulomas, cryptitis or other histologic evidence   of active Crohn; negative for   dysplasia     B. Colon, Biopsy of White Patch at 65 cm:   Colonic mucosa with mild crypt architecture distortion, evidence of prior   injury; no granulomas, cryptitis or   other histologic evidence of active Crohn; negative for dysplasia     C. Colon, Biopsies at 60 cm:   Colonic mucosa with mild fibrosis and crypt architecture distortion,   evidence of prior injury; no granulomas,   cryptitis or other histologic evidence of active Crohn; negative for   dysplasia     D. Colon, Biopsies at 50 cm:   Chronic active colitis with occasional crypt abscess, one small granuloma,    and crypt architectural distortion;   consistent with mildly active Crohn; negative for dysplasia; report of CMV    immunohistochemistry to follow     E. Colon, Biopsies at 40 cm:   Moderately active chronic colitis with crypt abscesses and crypt   distortion; negative for dysplasia; report of   CMV immunohistochemistry to follow     F. Colon, Biopsies at 30 cm:   Moderately active chronic colitis with crypt injuries and crypt   distortion; negative for dysplasia; report of   CMV immunohistochemistry to follow     G. Colon, Biopsies at 20 cm:   Moderately active chronic colitis with crypt injuries and crypt   distortion; negative for dysplasia; report of   CMV immunohistochemistry to follow     H. Colon, Biopsies at 10 cm:   Moderately active chronic colitis with crypt injuries and crypt   distortion; negative for dysplasia; report of   CMV immunohistochemistry to follow      PERTINENT MEDICATIONS:  - Azathioprine 75 mg BID  - Stelara every 4 weeks subQ injection (started 1/2021)   - Maria Victoria PRATT   Medications reviewed with patient today, see Medication List/Assessment for details.  No other  NSAID/anticoagulation reported by patient.  No other OTC/herbal/supplements reported by patient.    SOCIAL HISTORY:  - Works as   - Had been sober from ETOH x 1 year, now drinking 1-2 drinks a couple times a week  - Smokes marijuana 2-3 times/week  - No tobacco   - Sexually active, uses condoms      FAMILY HISTORY:  No colon/panc/esophageal/other GI CA, no other Valverde or other HPS-related Keo. No IBD/celiac, no other AI/liver/thyroid disease. + for asthma and acne.     PHYSICAL EXAMINATION:   General appearance  Healthy appearing adult, in no acute distress     Eyes  Sclera anicteric  Pupils round and reactive to light     Ears, nose, mouth and throat  No obvious external lesions of ears and nose  Hearing intact     Neck  Symmetric  No obvious external lesions     Respiratory  Normal respiration, no use of accessory muscles      MSK  Gait normal     Skin  No rashes or jaundice      Psychiatric  Oriented to person, place and time  Appropriate mood and affect.     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  She just recently began having symptoms of flare and fecal esvin elevated to 1100, cdiff negative.  Uceris PO started with improvement in symptoms and stelara q4 week. Recommend repeat endoscopic assessment after 6mo on q4 week interval, sooner if there is no improvement to determine next steps in therapy.     # Indeterminate colitis, on Stelara + AZA  # History of DVT  # Recurrent Cdiff infection  PLAN  -- Uceris PO   -- Continue Stelara every 4 weeks   -- Continue AZA 75 mg BID (levels were appropriate)      # LLE DVT:   thought to be provoked (factors include prolonged car ride from [place] to [place] when moved here this summer, strain to gastrocnemius muscle on crutches for 6 weeks, on birth control and active IBD) and was previously on Eliquis.   She discontinued oral contraceptives. Will not favor tofacitinib has a  therapy given DVT history.    # IBD health maintenance   --Greatly appreciate [facility] pharmacy    Vaccinations:  -- Influenza (every year): Last given 2020  -- TdaP (every 10 years): Last given 2014  -- Pneumococcal Pneumonia (once then every 5 years): Last given 2015  -- Yearly assessment for latent Tb (verbal screening and exam, PPD or QuantiFERON-Tb testing):negative 2020    One time confirmation of immunity or serologies:  -- Hepatitis A (serologies or immunizations): Unknown  -- Hepatitis B (serologies or immunizations): Vaccinated  -- Varicella: had chicken pox as a child  -- MMR: Unknwon   -- HPV (all aged 18-26): Up to date  -- Meningococcal meningitis (all patients at risk for meningitis): Unknown  -- Due to the immunosuppression in this patient, I would not advise administration of live vaccines such as varicella/VZV, intranasal influenza, MMR, or yellow fever vaccine (if travelling).    Bone mineral density screening   -- DEXA WNL 2015    Cancer Screening:  Colon cancer screening: due 2022  Cervical cancer screening: Annual due to immunosuppression (completed 1/5/21)    PERTINENT STUDIES:  CBC RESULTS:   Recent Labs   Lab Test 02/14/20  1505   WBC 7.0   RBC 3.76*   HGB 11.7   HCT 36.4   MCV 97   MCH 31.1   MCHC 32.1   RDW 12.0        CRP Inflammation   Date Value Ref Range Status   07/12/2021 <2.9 0.0 - 8.0 mg/L Final   12/18/2020 <2.9 0.0 - 8.0 mg/L Final     RTC as needed     Thank you for this consultation. 48  minutes spent on the date of the encounter doing chart review, history and exam, documentation and further activities as noted above.  It was a pleasure to participate in the care of this patient; please contact us with any further questions.      Guero Arias PA-C  Division of Gastroenterology, Hepatology and Nutrition  [facility]

## 2021-09-15 NOTE — LETTER
9/15/2021         RE: Natalie Villa  [address]  [address]  [place] [address]        Dear Colleague,    Thank you for referring your patient, Natalie Villa, to the [facility] [place]. Please see a copy of my visit note below.    GI CLINIC VISIT - ESTABLISHED PATIENT    CC/REFERRING PROVIDER: Referred Self  REASON FOR CONSULTATION: IBD follow up    HPI: 28 year old female with history of Crohn's vs UC on Vedolizumab (failed adalimumab, and questionable antibodies to infliximab, vedolizumab start 6/19/2015) + AZA, initially diagnosed with Crohn's in 2013. Her symptoms at the time were bloody diarrhea, left sided abdominal pain and general fatigue. She was treated with Humira for 1 year but stopped after colonoscopy showed ongoing disease. Then was treated with Remicade for about 18 months but per patient report, thinks she developed antibodies to it. She was started on Vedolizumab in June of 2015 initially at 300 mg Q8 weeks which was increased to Q6 weeks and mostly recently to Q4 weeks in May 2018 after colonoscopy in April 2018 showed ongoing colitis. She has been on AZA since diagnosis, initially at 200 mg Qday which has been titrated down to 100 mg Qday.     She had her first occurrence of C. difficile infection after the stool studies were completed after last visit in October 2018.  She completed a vancomycin taper.  Second occurrence (spontaneous without abx trigger) treated with vancomycin QID x 10 days then daily. Fecal esvin elevated to ~300, repeat cscope at that time showed Borrego 1 in descending, sigmoid and rectum.     She also continues to follow with hematology as she was diagnosed with a LLE DVT fall 2018 that was thought to be provoked and completed therapy with Eliquis.      Interval history, 5/2020 (virtual visit)  Continues on VDZ q4w + AZA 75 mg BID. Last infusion 5/8/2020 (4/10/2020). Of note, CRP was 25.   Had met with Dr. Obrien. Tried the Whole 30  "diet Mar-Apr. Stool was inconsistent at that time in the s/o increased fiber use (had loose stools up to 7 x per day). Stool normalized after liberalizing diet.     Using \"GI monitor\" brandon. Having ~4 BMs per day. Minimal urgency, mixed consistency stool, no blood. No abd pain.     Reports feeling well compared to the past. Seems to be inconsistent with recent CRP elevation.     Interval history, 9/2020 (video visit)  Saw Dr. Dodson with plans for a fecal calprotectin check in the setting of suppressive vancomycin. FC was 307.   Feeling \"pretty good\".   No abd pain. Having 3-4 BMs per day (improved). No blood. No urgency. Has not been dieting and exercising as much as before.   No joint pain or rashes.   Has been sober since March; has found this challenging.   Last Entyvio infusion was 8/28    Interval history, 12/2020 (video visit)  FC was elevated and icscope findings below, with active disease.   Having 3-4 well formed BMs per day. No blood. No urgency or abd pain. Weight has been stable.     Interval history 3/31/21 (video visit)  Feeling great! 1-2 formed stool per day, no blood in the stool. No urgency or nighttime stools. Stopped Vancomycin a couple weeks after loading dose of stelara. Continues on AZA 75 mg BID. Next injection is 5/6/21.  Had first COVID vaccine 1-2 weeks ago.     Interval hx 8/11/21  In May went through break up with boyfriend that was very sudden and unexpected. 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 She also found out she will be moving to [place] next month for work.  Now with 6-7 stools per day, small amounts and loose. Blood in than half of the stool. Cdiff negative, fecal esvin elevated to 1100. Increase in allergies, head congestion   No joint pain or rashes.     Interval hx 8/25/21  Feeing on Uceris 9mg daily. Stelara q4 wk was approved. This week she is " at 8 week lashon, so will moving into q4 weeks this next cycle.   She is having 4-5 stools per day, no blood in the stool. Often first stools are looser then become more formed. No nighttime stools. Some urgency, but improved. No fecal incontinence. No EIM at this time.     She has reached out to her previous IBD provider and will be reestablishing care.     Interval hx 9/15/21   Frequency has decreased to 3 times per day, no blood in the stool. No abdominal pain, improvement in gas pain. No nighttime stools, some urgency but no fecal incontinence.     ROS: 10pt ROS performed and otherwise negative.    PERTINENT PAST MEDICAL HISTORY:  As noted above.    PREVIOUS ABDOMINAL/GYNECOLOGIC SURGERIES:  None     PREVIOUS ENDOSCOPY:  Colonoscopy 4/23/2018 at [facility]: Mild colitis starting at the transverse colon (Borrego 1), progressing to more moderate colitis in the rectosigmoid (Borrego 2). Bx showed chronic colitis. TI and right colon were unremarkable endoscopically and bx showed mild chronic activity.     Flex sig 10/2/2018 shows Borrego 2/3, continuous, biopsies show moderate chronic active colitis.    Colonoscopy 5/2019 shows  Borrego 1 in descending, sigmoid and rectum. Bx show mild active chronic colitis with cryptitis and focal erosion.     icscope 11/2020 showed SES-CD 12  PATH  SPECIMEN(S):   A: Colon biopsies at 70 cm   B: Colon biopsy, white patch at 65 cm   C: Colon biopsies at 60 cm   D: Colon biopsies at 50 cm   E: Colon biopsies at 40 cm   F: Colon biopsies at 30 cm   G: Colon biopsies at 20 cm   H: Colon biopsies at 10 cm Contnu  FINAL DIAGNOSIS:   A. Colon, Biopsies at 70 cm:   Colonic mucosa with no granulomas, cryptitis or other histologic evidence   of active Crohn; negative for   dysplasia     B. Colon, Biopsy of White Patch at 65 cm:   Colonic mucosa with mild crypt architecture distortion, evidence of prior   injury; no granulomas, cryptitis or   other histologic evidence of active Crohn; negative for  dysplasia     C. Colon, Biopsies at 60 cm:   Colonic mucosa with mild fibrosis and crypt architecture distortion,   evidence of prior injury; no granulomas,   cryptitis or other histologic evidence of active Crohn; negative for   dysplasia     D. Colon, Biopsies at 50 cm:   Chronic active colitis with occasional crypt abscess, one small granuloma,    and crypt architectural distortion;   consistent with mildly active Crohn; negative for dysplasia; report of CMV    immunohistochemistry to follow     E. Colon, Biopsies at 40 cm:   Moderately active chronic colitis with crypt abscesses and crypt   distortion; negative for dysplasia; report of   CMV immunohistochemistry to follow     F. Colon, Biopsies at 30 cm:   Moderately active chronic colitis with crypt injuries and crypt   distortion; negative for dysplasia; report of   CMV immunohistochemistry to follow     G. Colon, Biopsies at 20 cm:   Moderately active chronic colitis with crypt injuries and crypt   distortion; negative for dysplasia; report of   CMV immunohistochemistry to follow     H. Colon, Biopsies at 10 cm:   Moderately active chronic colitis with crypt injuries and crypt   distortion; negative for dysplasia; report of   CMV immunohistochemistry to follow      PERTINENT MEDICATIONS:  - Azathioprine 75 mg BID  - Stelara every 4 weeks subQ injection (started 1/2021)   - [facility]   Medications reviewed with patient today, see Medication List/Assessment for details.  No other NSAID/anticoagulation reported by patient.  No other OTC/herbal/supplements reported by patient.    SOCIAL HISTORY:  - Works as   - Had been sober from ETOH x 1 year, now drinking 1-2 drinks a couple times a week  - Smokes marijuana 2-3 times/week  - No tobacco   - Sexually active, uses condoms      FAMILY HISTORY:  No colon/panc/esophageal/other GI CA, no other Valverde or other HPS-related Keo. No IBD/celiac, no other AI/liver/thyroid disease. + for asthma and acne.     PHYSICAL  EXAMINATION:   General appearance  Healthy appearing adult, in no acute distress     Eyes  Sclera anicteric  Pupils round and reactive to light     Ears, nose, mouth and throat  No obvious external lesions of ears and nose  Hearing intact     Neck  Symmetric  No obvious external lesions     Respiratory  Normal respiration, no use of accessory muscles      MSK  Gait normal     Skin  No rashes or jaundice      Psychiatric  Oriented to person, place and time  Appropriate mood and affect.     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  She just recently began having symptoms of flare and fecal esvin elevated to 1100, cdiff negative.  Uceris PO started with improvement in symptoms and stelara q4 week. Recommend repeat endoscopic assessment after 6mo on q4 week interval, sooner if there is no improvement to determine next steps in therapy.     # Indeterminate colitis, on Stelara + AZA  # History of DVT  # Recurrent Cdiff infection  PLAN  -- Uceris PO   -- Continue Stelara every 4 weeks   -- Continue AZA 75 mg BID (levels were appropriate)      # LLE DVT:   thought to be provoked (factors include prolonged car ride from [place] to [place] when moved here this summer, strain to gastrocnemius muscle on crutches for 6 weeks, on birth control and active IBD) and was previously on Eliquis.   She discontinued oral contraceptives. Will not favor tofacitinib has a therapy given DVT history.    # IBD health maintenance   --Greatly appreciate [facility] pharmacy    Vaccinations:  -- Influenza (every year): Last given 2020  -- TdaP (every 10 years): Last given 2014  -- Pneumococcal Pneumonia (once then every 5 years): Last given 2015  -- Yearly assessment for latent Tb (verbal screening and exam, PPD or QuantiFERON-Tb testing):negative 2020    One time confirmation of immunity or serologies:  -- Hepatitis A (serologies or immunizations): Unknown  -- Hepatitis  B (serologies or immunizations): Vaccinated  -- Varicella: had chicken pox as a child  -- MMR: Unknwon   -- HPV (all aged 18-26): Up to date  -- Meningococcal meningitis (all patients at risk for meningitis): Unknown  -- Due to the immunosuppression in this patient, I would not advise administration of live vaccines such as varicella/VZV, intranasal influenza, MMR, or yellow fever vaccine (if travelling).    Bone mineral density screening   -- DEXA WNL 2015    Cancer Screening:  Colon cancer screening: due 2022  Cervical cancer screening: Annual due to immunosuppression (completed 1/5/21)    PERTINENT STUDIES:  CBC RESULTS:   Recent Labs   Lab Test 02/14/20  1505   WBC 7.0   RBC 3.76*   HGB 11.7   HCT 36.4   MCV 97   MCH 31.1   MCHC 32.1   RDW 12.0        CRP Inflammation   Date Value Ref Range Status   07/12/2021 <2.9 0.0 - 8.0 mg/L Final   12/18/2020 <2.9 0.0 - 8.0 mg/L Final     RTC as needed     Thank you for this consultation. 48  minutes spent on the date of the encounter doing chart review, history and exam, documentation and further activities as noted above.  It was a pleasure to participate in the care of this patient; please contact us with any further questions.      Guero Arias PA-C  Division of Gastroenterology, Hepatology and Nutrition  [facility]        Again, thank you for allowing me to participate in the care of your patient.        Sincerely,        Guero Arias PA-C

## 2021-09-15 NOTE — NURSING NOTE
Chief Complaint   Patient presents with     Follow Up     Four week        Vitals:    09/15/21 0811   Weight: 176 lb 8 oz       Body mass index is 28.49 kg/m .      Margareth Sánchez, EMT  Surgery Clinic

## 2021-09-26 ENCOUNTER — HEALTH MAINTENANCE LETTER (OUTPATIENT)
Age: 31
End: 2021-09-26

## 2021-09-29 NOTE — MR AVS SNAPSHOT
After Visit Summary   10/24/2018    Natalie Villa    MRN: [id number]           Patient Information     Date Of Birth          1990        Visit Information        Provider Department      10/24/2018 2:20 PM Guero Arias PA-C [facility]        Today's Diagnoses     Crohn's disease of large intestine without complication (H)    -  1      Care Instructions    It was a pleasure taking care of you today.  I've included a brief summary of our discussion and care plan from today's visit below.  Please review this information with your primary care provider.  ______________________________________________________________________    My recommendations are summarized as follows:    -- Continue entyvio every 4 weeks  -- INCREASE azathioprine to 75 mg twice daily (or 150 mg daily)  -- Labs with infusions  -- Stool when able   -- Patient with IBD we recommend supplementation vitamin D 1000 units daily and calcium 500 mg twice daily.  -- Vaccines/immunizations to be updated: flu shot  -- Yearly Dermatology visit for skin check while on immunosuppressive therapy. Can call [phone] to schedule.  -- Yearly pap smear while on immunosuppressive therapy  -- No NSAIDs (ibuprofen, or anything containing ibuprofen)       For additional resources about inflammatory bowel disease visit [email or web address]      Return to GI Clinic in 3 months to review your progress.    ______________________________________________________________________    Who do I call with any questions after my visit?  Please be in touch if there are any further questions that arise following today's visit.  There are multiple ways to contact your gastroenterology care team.        During business hours, you may reach a Gastroenterology nurse at [phone], option 3.       To schedule or reschedule an appointment, please call [phone].       You can always send a secure message through  [employer].  [employer] messages are answered by your nurse or doctor typically within 24 hours.  Please allow extra time on weekends and holidays.        For urgent/emergent questions after business hours, you may reach the on-call GI Fellow by contacting the [facility]  at [phone].      In order for your refill to be processed in a timely fashion, it is your responsibility to ensure you follow the recommendations from your provider regarding your laboratory studies and follow up appointments.       How will I get the results of any tests ordered?    You will receive all of your results.  If you have signed up for [employer], any tests ordered at your visit will be available to you after your physician reviews them.  Typically this takes 1-2 weeks.  If there are urgent results that require a change in your care plan, your physician or nurse will call you to discuss the next steps.      What is [employer]?  [employer] is a secure way for you to access all of your healthcare records from the [facility].  It is a web based computer program, so you can sign on to it from any location.  It also allows you to send secure messages to your care team.  I recommend signing up for [employer] access if you have not already done so and are comfortable with using a computer.      How to I schedule a follow-up visit?  If you did not schedule a follow-up visit today, please call [phone] to schedule a follow-up office visit.        Sincerely,    Guero Arias PA-C  [facility]  Division of Gastroenterology            Follow-ups after your visit        Follow-up notes from your care team     Return in about 3 months (around 1/24/2019).      Your next 10 appointments already scheduled     Nov 13, 2018  8:00 AM CST   Level 2 with  INFUSION CHAIR 1   [facility] ([facility])    [facility]  [address]  [address]  [facility]  [address]   [phone]            Dec 14, 2018  8:00 AM CST   Level 2 with RH INFUSION CHAIR 12   [facility] ([facility])    [facility]  [address]  [address]  [place] [address]   [phone]            Dec 17, 2018  7:00 AM CST   (Arrive by 6:45 AM)   RETURN INFLAMMATORY BOWEL DISEASE with Guero Arias PA-C   [facility] ([facility])    [address] [place] [address]  [address] [facility] [address] [phone]              Future tests that were ordered for you today     Open Future Orders        Priority Expected Expires Ordered    Calprotectin Feces Routine  10/24/2019 10/24/2018            Who to contact     Please call your clinic at [phone] to:    Ask questions about your health    Make or cancel appointments    Discuss your medicines    Learn about your test results    Speak to your doctor            Additional Information About Your Visit        [employer] Information     [employer] gives you secure access to your electronic health record. If you see a primary care provider, you can also send messages to your care team and make appointments. If you have questions, please call your primary care clinic.  If you do not have a primary care provider, please call [phone] and they will assist you.      [employer] is an electronic gateway that provides easy, online access to your medical records. With [employer], you can request a clinic appointment, read your test results, renew a prescription or communicate with your care team.     To access your existing account, please contact your [facility] or call [phone] for assistance.        Care EveryWhere ID     This is your Care EveryWhere ID. This could be used by other organizations to access your [place] medical records  [id number]        Your Vitals Were     Pulse Temperature Last Period  Pulse Oximetry BMI (Body Mass Index)       70 98.5  F (36.9  C) (Oral) 10/05/2018 99% 29.39 kg/m2        Blood Pressure from Last 3 Encounters:   10/24/18 117/72   10/19/18 118/70   09/25/18 120/72    Weight from Last 3 Encounters:   10/24/18 80.1 kg (176 lb 9.6 oz)   10/19/18 79.4 kg (175 lb)   09/25/18 78.9 kg (173 lb 14.4 oz)                 Today's Medication Changes          These changes are accurate as of 10/24/18  3:34 PM.  If you have any questions, ask your nurse or doctor.               These medicines have changed or have updated prescriptions.        Dose/Directions    azaTHIOprine 50 MG tablet   Commonly known as:  IMURAN   Indication:  Inflammatory Bowel Disease   This may have changed:  how much to take   Used for:  Crohn's disease of large intestine without complication (H)   Changed by:  Guero Arias PA-C        Dose:  75 mg   Take 1.5 tablets (75 mg) by mouth 2 times daily   Quantity:  180 tablet   Refills:  1            Where to get your medicines      These medications were sent to [id number] - [place], [place] - [address] AT [facility] &  [address]  [address] [place] [address]     Phone:  [phone]     azaTHIOprine 50 MG tablet                Primary Care Provider Fax #    Physician No Ref-Primary [phone]       No address on file        Equal Access to Services     UNA CADET AH: Hadii mirela Rosas, waaxda lupaulinaadaha, qaybta kaalmada donny, anu ibrahim adeangeline viera. So [facility] [phone].    ATENCIÓN: Si habla español, tiene a pham disposición servicios gratuitos de asistencia lingüística. Llame al [phone].    We comply with applicable federal civil rights laws and [place] laws. We do not discriminate on the basis of race, color, national origin, age, disability, sex, sexual orientation, or gender identity.            Thank you!     Thank you for choosing [facility]  for your care. Our goal is  always to provide you with excellent care. Hearing back from our patients is one way we can continue to improve our services. Please take a few minutes to complete the written survey that you may receive in the mail after your visit with us. Thank you!             Your Updated Medication List - Protect others around you: Learn how to safely use, store and throw away your medicines at www.disposemymeds.org.          This list is accurate as of 10/24/18  3:34 PM.  Always use your most recent med list.                   Brand Name Dispense Instructions for use Diagnosis    ALBUTEROL IN      None Entered        ALBUTEROL SULFATE IN      None Entered        apixaban ANTICOAGULANT 5 MG tablet    ELIQUIS     Take 5 mg by mouth        azaTHIOprine 50 MG tablet    IMURAN    180 tablet    Take 1.5 tablets (75 mg) by mouth 2 times daily    Crohn's disease of large intestine without complication (H)       multivitamin, therapeutic Tabs tablet      Take 1 tablet by mouth daily        vedolizumab 60 MG/ML injection    ENTYVIO             Statement Selected

## 2022-03-12 ENCOUNTER — HEALTH MAINTENANCE LETTER (OUTPATIENT)
Age: 32
End: 2022-03-12

## 2022-06-19 NOTE — PROGRESS NOTES
"Subjective:   Natalie Villa is a 29 year old female who presents for   Chief Complaint   Patient presents with     Urgent Care     Pharyngitis     st, mostly on right side, getting harder to swallow x 2d     Symptoms started 2 days ago and woke up Weds morning with sore throat. Has tried throat spray to help with swallowing. She also had cough drops available to help keep things from getting dry. She did salt water gargles and used acetaminophen before bed. She should wake up intermittently with discomfort of her throat. She denies any cough. Denies fevers.       Patient Active Problem List    Diagnosis Date Noted     Crohn's disease of large intestine without complication (H) 06/22/2018     Priority: Medium     Herpes zoster without complication 06/22/2018     Priority: Medium       Current Outpatient Medications   Medication     acetaminophen (TYLENOL) 325 MG tablet     ALBUTEROL IN     azaTHIOprine (IMURAN) 50 MG tablet     calcium carbonate (OS-ELIEL) 500 MG tablet     multivitamin, therapeutic (THERA-VIT) TABS tablet     predniSONE (DELTASONE) 10 MG tablet     Probiotic Product (VSL#3 DS) PACK     vedolizumab (ENTYVIO) 60 MG/ML injection     Vitamin D, Cholecalciferol, 1000 units CAPS     apixaban ANTICOAGULANT (ELIQUIS) 5 MG tablet     No current facility-administered medications for this visit.        ROS:  As above per HPI    Objective:   /70 (BP Location: Left arm, Patient Position: Sitting, Cuff Size: Adult Regular)   Pulse 76   Temp 98.1  F (36.7  C) (Oral)   Resp 12   Ht 1.676 m (5' 6\")   Wt 79.8 kg (176 lb)   LMP  (LMP Unknown)   SpO2 99%   Breastfeeding? No   BMI 28.41 kg/m  , Body mass index is 28.41 kg/m .  Gen:  NAD, well-nourished, sitting in chair comfortably  HEENT: EOMI, sclera anicteric, Head normocephalic, ; nares patent; moist mucous membranes, normal Tm's bilaterally, absent tonsils  Neck: trachea midline, no thyromegaly  CV:  Hemodynamically stable  Pulm:  no increased work " of breathing , CTAB, no wheezes/rales/rhonchi   Extrem: no cyanosis, edema or clubbing  Skin: no obvious rashes or abnormalities  Psych: Euthymic, linear thoughts, normal rate of speech    Results for orders placed or performed in visit on 08/09/19   Rapid strep screen   Result Value Ref Range    Specimen Description Throat     Rapid Strep A Screen       NEGATIVE: No Group A streptococcal antigen detected by immunoassay, await culture report.       Assessment & Plan:   Summer A Allen, 29 year old female who presents with:  Viral pharyngitis  Trial of low dose prednisone for 2 days to help with possible swelling from viral illness. No signs of trismus and no PTA. No palpable neck masses. Normal vitals. Tylenol PRN for pain as ibuprofen not recommended given her hx of chron's. Care tips provided in AVS  - predniSONE (DELTASONE) 10 MG tablet  Dispense: 2 tablet; Refill: 0  - Rapid strep screen  - Beta strep group A culture      Tomasz Harris MD   [facility]    The use of Dragon/[employer] dictation services may have been used to construct the content in this note; any grammatical or spelling errors are non-intentional. Please contact the author of this note directly if you are in need of any clarification.    show

## 2023-04-23 ENCOUNTER — HEALTH MAINTENANCE LETTER (OUTPATIENT)
Age: 33
End: 2023-04-23

## 2023-07-15 ENCOUNTER — HEALTH MAINTENANCE LETTER (OUTPATIENT)
Age: 33
End: 2023-07-15

## 2023-10-23 NOTE — TELEPHONE ENCOUNTER
LVM notifying patient that her appointment on 3/30/20 will be cancelled due to COVID-19 and to please call clinic to discuss.   No Exposure
